# Patient Record
Sex: MALE | Race: WHITE | Employment: FULL TIME | URBAN - METROPOLITAN AREA
[De-identification: names, ages, dates, MRNs, and addresses within clinical notes are randomized per-mention and may not be internally consistent; named-entity substitution may affect disease eponyms.]

---

## 2018-12-11 ENCOUNTER — OFFICE VISIT (OUTPATIENT)
Dept: PRIMARY CARE CLINIC | Age: 48
End: 2018-12-11
Payer: COMMERCIAL

## 2018-12-11 VITALS
OXYGEN SATURATION: 98 % | HEIGHT: 68 IN | WEIGHT: 227.4 LBS | TEMPERATURE: 97.8 F | BODY MASS INDEX: 34.46 KG/M2 | SYSTOLIC BLOOD PRESSURE: 164 MMHG | HEART RATE: 100 BPM | DIASTOLIC BLOOD PRESSURE: 106 MMHG

## 2018-12-11 DIAGNOSIS — R03.0 ELEVATED BP WITHOUT DIAGNOSIS OF HYPERTENSION: ICD-10-CM

## 2018-12-11 DIAGNOSIS — Z28.21 REFUSED PNEUMOCOCCAL VACCINATION: ICD-10-CM

## 2018-12-11 DIAGNOSIS — Z80.42 FAMILY HISTORY OF PROSTATE CANCER: ICD-10-CM

## 2018-12-11 DIAGNOSIS — Z12.5 SCREENING FOR PROSTATE CANCER: ICD-10-CM

## 2018-12-11 DIAGNOSIS — R10.9 BILATERAL FLANK PAIN: Primary | ICD-10-CM

## 2018-12-11 DIAGNOSIS — R31.9 HEMATURIA, UNSPECIFIED TYPE: ICD-10-CM

## 2018-12-11 DIAGNOSIS — Z13.1 SCREENING FOR DIABETES MELLITUS: ICD-10-CM

## 2018-12-11 DIAGNOSIS — B35.1 ONYCHOMYCOSIS: ICD-10-CM

## 2018-12-11 DIAGNOSIS — E78.00 HIGH CHOLESTEROL: ICD-10-CM

## 2018-12-11 DIAGNOSIS — R35.0 URINARY FREQUENCY: ICD-10-CM

## 2018-12-11 LAB
BILIRUBIN, POC: ABNORMAL
BLOOD URINE, POC: ABNORMAL
CLARITY, POC: CLEAR
COLOR, POC: YELLOW
GLUCOSE URINE, POC: ABNORMAL
KETONES, POC: ABNORMAL
LEUKOCYTE EST, POC: ABNORMAL
NITRITE, POC: ABNORMAL
PH, POC: 6
PROTEIN, POC: ABNORMAL
SPECIFIC GRAVITY, POC: 1.02
UROBILINOGEN, POC: 0.2

## 2018-12-11 PROCEDURE — 99203 OFFICE O/P NEW LOW 30 MIN: CPT | Performed by: PHYSICIAN ASSISTANT

## 2018-12-11 PROCEDURE — 81003 URINALYSIS AUTO W/O SCOPE: CPT | Performed by: PHYSICIAN ASSISTANT

## 2018-12-11 RX ORDER — TAMSULOSIN HYDROCHLORIDE 0.4 MG/1
CAPSULE ORAL
COMMUNITY
Start: 2018-12-07 | End: 2019-01-02 | Stop reason: SDUPTHER

## 2018-12-11 ASSESSMENT — PATIENT HEALTH QUESTIONNAIRE - PHQ9
SUM OF ALL RESPONSES TO PHQ QUESTIONS 1-9: 0
SUM OF ALL RESPONSES TO PHQ QUESTIONS 1-9: 0
2. FEELING DOWN, DEPRESSED OR HOPELESS: 0
SUM OF ALL RESPONSES TO PHQ9 QUESTIONS 1 & 2: 0
1. LITTLE INTEREST OR PLEASURE IN DOING THINGS: 0

## 2018-12-11 NOTE — PROGRESS NOTES
pain with ejaculation. Father had prostate cancer about 1 year ago (diagnosed age 70) and pt had some cousins with prostate cancer as well (young in 29's). Thinks maybe one paternal uncle had prostate cancer? Using a heating pad helps the flank pain some. Does not think the back pain is positional.   Symptoms may be slightly better with Flomax. Urine looked dark, but has not seen blood. Has fungal infection on his toenails for quite awhile and would like that treated. No results found for: LDLCHOLESTEROL, LDLCALC    (goal LDL is <100)   No results found for: AST, ALT, BUN  BP Readings from Last 3 Encounters:   12/11/18 (!) 164/106          (goal 120/80)    Past Medical History:   Diagnosis Date    Hyperlipidemia     Hypertension       Past Surgical History:   Procedure Laterality Date    KNEE SURGERY Right     WISDOM TOOTH EXTRACTION Bilateral        Family History   Problem Relation Age of Onset    Prostate Cancer Father 70    Prostate Cancer Paternal Cousin        Social History   Substance Use Topics    Smoking status: Current Every Day Smoker     Packs/day: 0.50     Years: 33.00     Types: Cigarettes     Start date: 12/11/1985    Smokeless tobacco: Never Used    Alcohol use Yes      Comment: social      Current Outpatient Prescriptions   Medication Sig Dispense Refill    tamsulosin (FLOMAX) 0.4 MG capsule       aspirin 81 MG tablet Take 81 mg by mouth daily       No current facility-administered medications for this visit. No Known Allergies    Health Maintenance   Topic Date Due    HIV screen  09/03/1985    Lipid screen  09/03/2010    Diabetes screen  09/03/2010    DTaP/Tdap/Td vaccine (1 - Tdap) 09/02/2018    Flu vaccine  Completed       Subjective:      Review of Systems   Constitutional: Negative for fever. Cardiovascular: Negative for chest pain and leg swelling.    Gastrointestinal: Negative for abdominal pain (only gets abdominal pain with dairy products), nausea and vomiting. Genitourinary: Positive for flank pain, frequency and urgency. Negative for dysuria and testicular pain. Skin:        +Fungal infection of toenails   Neurological: Positive for headaches (about 2x per week). Negative for light-headedness. Objective:     BP (!) 164/106   Pulse 100   Temp 97.8 °F (36.6 °C)   Ht 5' 8\" (1.727 m)   Wt 227 lb 6.4 oz (103.1 kg)   SpO2 98%   BMI 34.58 kg/m²   Physical Exam   Constitutional: He appears well-developed and well-nourished. No distress. HENT:   Head: Normocephalic and atraumatic. Mouth/Throat: Oropharynx is clear and moist.   Cardiovascular: Normal rate, regular rhythm and normal heart sounds. Pulmonary/Chest: Effort normal and breath sounds normal.   Abdominal: Soft. Bowel sounds are normal. He exhibits no distension. There is no tenderness. There is no rebound, no guarding and no CVA tenderness. Musculoskeletal:        Lumbar back: He exhibits no tenderness and no bony tenderness. Pt said he maybe felt some of the back tenderness with trunk rotation   Skin: He is not diaphoretic. Toenail on great toe on the left foot is yellowed and toenails 1,4, and 5 are yellowed on right foot    Nursing note and vitals reviewed. Assessment:       Diagnosis Orders   1. Bilateral flank pain  CT ABDOMEN PELVIS WO CONTRAST    Urine Culture   2. Hematuria, unspecified type  CT ABDOMEN PELVIS WO CONTRAST    BUN & Creatinine    Urine Culture   3. Urinary frequency  POCT Urinalysis No Micro (Auto)    CT ABDOMEN PELVIS WO CONTRAST    BUN & Creatinine    Urine Culture   4. Screening for prostate cancer  Psa screening   5. Family history of prostate cancer  Psa screening   6. Onychomycosis  AST    ALT   7. High cholesterol  Lipid Panel    AST    ALT   8. Screening for diabetes mellitus  Glucose, Fasting   9. Elevated BP without diagnosis of hypertension     10. Refused pneumococcal vaccination          Plan:     1-3 UA today only showed small blood.  Urine

## 2018-12-12 LAB
ALT SERPL-CCNC: NORMAL U/L
AST SERPL-CCNC: NORMAL U/L
BUN / CREAT RATIO: NORMAL
BUN BLDV-MCNC: NORMAL MG/DL
CHOLESTEROL, TOTAL: 239 MG/DL
CHOLESTEROL/HDL RATIO: 6.3
CREAT SERPL-MCNC: NORMAL MG/DL
GLUCOSE FASTING: 99 MG/DL
HDLC SERPL-MCNC: 38 MG/DL (ref 35–70)
LDL CHOLESTEROL CALCULATED: 166 MG/DL (ref 0–160)
PROSTATE SPECIFIC ANTIGEN: NORMAL NG/ML
TRIGL SERPL-MCNC: 174 MG/DL
VLDLC SERPL CALC-MCNC: 35 MG/DL

## 2018-12-14 LAB — URINE CULTURE, ROUTINE: NORMAL

## 2018-12-15 PROBLEM — Z28.21 REFUSED PNEUMOCOCCAL VACCINATION: Status: ACTIVE | Noted: 2018-12-15

## 2018-12-15 PROBLEM — B35.1 ONYCHOMYCOSIS: Status: ACTIVE | Noted: 2018-12-15

## 2018-12-15 PROBLEM — Z80.42 FAMILY HISTORY OF PROSTATE CANCER: Status: ACTIVE | Noted: 2018-12-15

## 2018-12-15 PROBLEM — R03.0 ELEVATED BP WITHOUT DIAGNOSIS OF HYPERTENSION: Status: ACTIVE | Noted: 2018-12-15

## 2018-12-15 ASSESSMENT — ENCOUNTER SYMPTOMS
NAUSEA: 0
VOMITING: 0
ABDOMINAL PAIN: 0

## 2018-12-17 DIAGNOSIS — B35.1 ONYCHOMYCOSIS: Primary | ICD-10-CM

## 2018-12-17 DIAGNOSIS — Z13.1 SCREENING FOR DIABETES MELLITUS: ICD-10-CM

## 2018-12-17 DIAGNOSIS — E78.00 HIGH CHOLESTEROL: ICD-10-CM

## 2018-12-17 DIAGNOSIS — R31.9 HEMATURIA, UNSPECIFIED TYPE: ICD-10-CM

## 2018-12-17 DIAGNOSIS — Z12.5 SCREENING FOR PROSTATE CANCER: ICD-10-CM

## 2018-12-17 DIAGNOSIS — R35.0 URINARY FREQUENCY: ICD-10-CM

## 2018-12-17 DIAGNOSIS — B35.1 ONYCHOMYCOSIS: ICD-10-CM

## 2018-12-17 DIAGNOSIS — Z80.42 FAMILY HISTORY OF PROSTATE CANCER: ICD-10-CM

## 2018-12-17 RX ORDER — ATORVASTATIN CALCIUM 20 MG/1
TABLET, FILM COATED ORAL
Qty: 30 TABLET | Refills: 2 | Status: SHIPPED | OUTPATIENT
Start: 2018-12-17 | End: 2019-11-12 | Stop reason: SDUPTHER

## 2018-12-17 RX ORDER — TERBINAFINE HYDROCHLORIDE 250 MG/1
250 TABLET ORAL DAILY
Qty: 84 TABLET | Refills: 0 | Status: SHIPPED | OUTPATIENT
Start: 2018-12-17 | End: 2021-02-15

## 2019-01-02 ENCOUNTER — OFFICE VISIT (OUTPATIENT)
Dept: PRIMARY CARE CLINIC | Age: 49
End: 2019-01-02
Payer: COMMERCIAL

## 2019-01-02 VITALS
SYSTOLIC BLOOD PRESSURE: 130 MMHG | HEART RATE: 91 BPM | BODY MASS INDEX: 35.37 KG/M2 | OXYGEN SATURATION: 96 % | DIASTOLIC BLOOD PRESSURE: 76 MMHG | WEIGHT: 233.4 LBS | HEIGHT: 68 IN

## 2019-01-02 DIAGNOSIS — N40.1 BPH WITH OBSTRUCTION/LOWER URINARY TRACT SYMPTOMS: ICD-10-CM

## 2019-01-02 DIAGNOSIS — R31.21 ASYMPTOMATIC MICROSCOPIC HEMATURIA: Primary | ICD-10-CM

## 2019-01-02 DIAGNOSIS — N13.8 BPH WITH OBSTRUCTION/LOWER URINARY TRACT SYMPTOMS: ICD-10-CM

## 2019-01-02 LAB
BILIRUBIN, POC: ABNORMAL
BLOOD URINE, POC: ABNORMAL
CLARITY, POC: CLEAR
COLOR, POC: YELLOW
GLUCOSE URINE, POC: ABNORMAL
KETONES, POC: ABNORMAL
LEUKOCYTE EST, POC: ABNORMAL
NITRITE, POC: ABNORMAL
PH, POC: 6
PROTEIN, POC: ABNORMAL
SPECIFIC GRAVITY, POC: 1.02
UROBILINOGEN, POC: ABNORMAL

## 2019-01-02 PROCEDURE — 81003 URINALYSIS AUTO W/O SCOPE: CPT | Performed by: FAMILY MEDICINE

## 2019-01-02 PROCEDURE — 99214 OFFICE O/P EST MOD 30 MIN: CPT | Performed by: FAMILY MEDICINE

## 2019-01-02 RX ORDER — TAMSULOSIN HYDROCHLORIDE 0.4 MG/1
0.4 CAPSULE ORAL DAILY
Qty: 90 CAPSULE | Refills: 3 | Status: SHIPPED | OUTPATIENT
Start: 2019-01-02 | End: 2021-02-15

## 2019-01-02 RX ORDER — ALBUTEROL SULFATE 90 UG/1
2 AEROSOL, METERED RESPIRATORY (INHALATION) EVERY 6 HOURS PRN
COMMUNITY

## 2019-01-02 ASSESSMENT — ENCOUNTER SYMPTOMS
WHEEZING: 0
VOMITING: 0
EYE REDNESS: 0
SHORTNESS OF BREATH: 0
SORE THROAT: 0
DIARRHEA: 0
EYE DISCHARGE: 0
RHINORRHEA: 0
COUGH: 1
NAUSEA: 0
ABDOMINAL PAIN: 0

## 2019-11-12 DIAGNOSIS — E78.00 HIGH CHOLESTEROL: ICD-10-CM

## 2019-11-14 RX ORDER — ATORVASTATIN CALCIUM 20 MG/1
TABLET, FILM COATED ORAL
Qty: 30 TABLET | Refills: 1 | Status: SHIPPED | OUTPATIENT
Start: 2019-11-14 | End: 2021-02-15

## 2020-03-25 ENCOUNTER — TELEPHONE (OUTPATIENT)
Dept: PRIMARY CARE CLINIC | Age: 50
End: 2020-03-25

## 2020-03-25 RX ORDER — NABUMETONE 750 MG/1
750 TABLET, FILM COATED ORAL 2 TIMES DAILY
Qty: 60 TABLET | Refills: 3 | Status: SHIPPED | OUTPATIENT
Start: 2020-03-25 | End: 2021-02-15

## 2020-03-25 NOTE — TELEPHONE ENCOUNTER
Pt state that Rt knee is swollen w/ pain, hyperextending x 2 months. Pt was using ibprofen but, states it's not working to well any more.  Pt would like to know if you could send in a anti-inflammatory to [id number] [place], [address]    Please advise

## 2021-02-05 RX ORDER — NABUMETONE 750 MG/1
TABLET, FILM COATED ORAL
Qty: 60 TABLET | Refills: 0 | OUTPATIENT
Start: 2021-02-05

## 2021-02-15 ENCOUNTER — OFFICE VISIT (OUTPATIENT)
Dept: PRIMARY CARE CLINIC | Age: 51
End: 2021-02-15
Payer: COMMERCIAL

## 2021-02-15 VITALS
OXYGEN SATURATION: 97 % | WEIGHT: 233.2 LBS | TEMPERATURE: 97.2 F | DIASTOLIC BLOOD PRESSURE: 80 MMHG | HEART RATE: 103 BPM | BODY MASS INDEX: 35.34 KG/M2 | SYSTOLIC BLOOD PRESSURE: 138 MMHG | HEIGHT: 68 IN

## 2021-02-15 DIAGNOSIS — Z13.220 ENCOUNTER FOR LIPID SCREENING FOR CARDIOVASCULAR DISEASE: ICD-10-CM

## 2021-02-15 DIAGNOSIS — Z13.6 ENCOUNTER FOR LIPID SCREENING FOR CARDIOVASCULAR DISEASE: ICD-10-CM

## 2021-02-15 DIAGNOSIS — M23.91 INTERNAL DERANGEMENT OF KNEE JOINT, RIGHT: ICD-10-CM

## 2021-02-15 DIAGNOSIS — Z12.5 SCREENING PSA (PROSTATE SPECIFIC ANTIGEN): ICD-10-CM

## 2021-02-15 DIAGNOSIS — E78.00 PURE HYPERCHOLESTEROLEMIA: Primary | ICD-10-CM

## 2021-02-15 DIAGNOSIS — E78.00 HIGH CHOLESTEROL: ICD-10-CM

## 2021-02-15 DIAGNOSIS — Z00.00 ANNUAL PHYSICAL EXAM: ICD-10-CM

## 2021-02-15 PROBLEM — N40.1 BPH WITH OBSTRUCTION/LOWER URINARY TRACT SYMPTOMS: Status: RESOLVED | Noted: 2019-01-02 | Resolved: 2021-02-15

## 2021-02-15 PROBLEM — R03.0 ELEVATED BP WITHOUT DIAGNOSIS OF HYPERTENSION: Status: RESOLVED | Noted: 2018-12-15 | Resolved: 2021-02-15

## 2021-02-15 PROBLEM — N13.8 BPH WITH OBSTRUCTION/LOWER URINARY TRACT SYMPTOMS: Status: RESOLVED | Noted: 2019-01-02 | Resolved: 2021-02-15

## 2021-02-15 PROBLEM — B35.1 ONYCHOMYCOSIS: Status: RESOLVED | Noted: 2018-12-15 | Resolved: 2021-02-15

## 2021-02-15 PROCEDURE — 99213 OFFICE O/P EST LOW 20 MIN: CPT | Performed by: FAMILY MEDICINE

## 2021-02-15 RX ORDER — NABUMETONE 750 MG/1
750 TABLET, FILM COATED ORAL 2 TIMES DAILY
Qty: 60 TABLET | Refills: 5 | Status: SHIPPED | OUTPATIENT
Start: 2021-02-15 | End: 2022-01-18

## 2021-02-15 RX ORDER — ATORVASTATIN CALCIUM 20 MG/1
20 TABLET, FILM COATED ORAL DAILY
Qty: 90 TABLET | Refills: 3 | Status: SHIPPED | OUTPATIENT
Start: 2021-02-15 | End: 2022-04-27 | Stop reason: SDUPTHER

## 2021-02-15 SDOH — ECONOMIC STABILITY: TRANSPORTATION INSECURITY
IN THE PAST 12 MONTHS, HAS LACK OF TRANSPORTATION KEPT YOU FROM MEETINGS, WORK, OR FROM GETTING THINGS NEEDED FOR DAILY LIVING?: NO

## 2021-02-15 SDOH — ECONOMIC STABILITY: TRANSPORTATION INSECURITY
IN THE PAST 12 MONTHS, HAS THE LACK OF TRANSPORTATION KEPT YOU FROM MEDICAL APPOINTMENTS OR FROM GETTING MEDICATIONS?: NO

## 2021-02-15 ASSESSMENT — ENCOUNTER SYMPTOMS
COUGH: 0
DIARRHEA: 0
WHEEZING: 0
SORE THROAT: 0
VOMITING: 0
RHINORRHEA: 0
EYE REDNESS: 0
ABDOMINAL PAIN: 0
EYE DISCHARGE: 0
NAUSEA: 0
SHORTNESS OF BREATH: 0

## 2021-02-15 ASSESSMENT — PATIENT HEALTH QUESTIONNAIRE - PHQ9
2. FEELING DOWN, DEPRESSED OR HOPELESS: 0
SUM OF ALL RESPONSES TO PHQ9 QUESTIONS 1 & 2: 0

## 2021-02-15 NOTE — PROGRESS NOTES
[facility] CARE  [address] [place] [id number]  Dept: [id number] [facility] is a 48 y.o. male Established patient, who presents today for his medical conditions/complaintsas noted below. Chief Complaint   Patient presents with    Medication Check    Knee Pain     Right- had knee surgery 20 years ago       HPI:     HPI  Patient states has history of right knee surgery in the past.  This was about 20 years ago. Patient states knee is feeling the same as it did then. Patient complaining of a locking or giving out sensation. Has been using nabumetone on an as-needed basis. Pain mostly in the popliteal fossa as well as on the medial joint line. States does have some swelling. Denies any erythema or calor. Otherwise unremarkable. Patient had stopped Lipitor on his own. States not sure why. Just ran out of his prescription.     Reviewed prior notes None  Reviewed previous Labs    LDL Calculated (mg/dL)   Date Value   12/12/2018 166 (A)       (goal LDL is <100)   No results found for: AST, ALT, BUN, LABA1C, TSH  BP Readings from Last 3 Encounters:   02/15/21 138/80   01/02/19 130/76   12/11/18 (!) 164/106          (goal 120/80)    Past Medical History:   Diagnosis Date    Hyperlipidemia     Hypertension       Past Surgical History:   Procedure Laterality Date    KNEE SURGERY Right     WISDOM TOOTH EXTRACTION Bilateral        Family History   Problem Relation Age of Onset    Prostate Cancer Father 70    Prostate Cancer Paternal Cousin        Social History     Tobacco Use    Smoking status: Current Every Day Smoker     Packs/day: 0.50     Years: 33.00     Pack years: 16.50     Types: Cigarettes     Start date: 12/11/1985    Smokeless tobacco: Never Used   Substance Use Topics    Alcohol use: Yes     Comment: social      Current Outpatient Medications   Medication Sig Dispense Refill  atorvastatin (LIPITOR) 20 MG tablet Take 1 tablet by mouth daily 90 tablet 3    nabumetone (RELAFEN) 750 MG tablet Take 1 tablet by mouth 2 times daily 60 tablet 5    albuterol sulfate  (90 Base) MCG/ACT inhaler Inhale 2 puffs into the lungs every 6 hours as needed for Wheezing      aspirin 81 MG tablet Take 81 mg by mouth daily       No current facility-administered medications for this visit. No Known Allergies    Health Maintenance   Topic Date Due    Hepatitis C screen  1970    Pneumococcal 0-64 years Vaccine (1 of 1 - PPSV23) 09/03/1976    HIV screen  09/03/1985    COVID-19 Vaccine (1 of 2) 09/03/1986    DTaP/Tdap/Td vaccine (2 - Td) 04/13/2019    Lipid screen  12/12/2019    Flu vaccine (1) 09/01/2020    Shingles Vaccine (1 of 2) 09/03/2020    Colon cancer screen colonoscopy  09/03/2020    Diabetes screen  12/12/2021    Hepatitis A vaccine  Aged Out    Hepatitis B vaccine  Aged Out    Hib vaccine  Aged Out    Meningococcal (ACWY) vaccine  Aged Out       Subjective:      Review of Systems   Constitutional: Negative for chills and fever. HENT: Negative for rhinorrhea and sore throat. Eyes: Negative for discharge and redness. Respiratory: Negative for cough, shortness of breath and wheezing. Cardiovascular: Negative for chest pain and palpitations. Gastrointestinal: Negative for abdominal pain, diarrhea, nausea and vomiting. Genitourinary: Negative for dysuria and frequency. Musculoskeletal: Positive for arthralgias. Negative for myalgias. Neurological: Negative for dizziness, light-headedness and headaches. Psychiatric/Behavioral: Negative for sleep disturbance. Objective:     /80   Pulse 103   Temp 97.2 °F (36.2 °C)   Ht 5' 8.04\" (1.728 m)   Wt 233 lb 3.2 oz (105.8 kg)   SpO2 97%   BMI 35.42 kg/m²   Physical Exam  Vitals signs and nursing note reviewed. Constitutional:       General: He is not in acute distress. Appearance: He is well-developed. He is not ill-appearing. HENT:      Head: Normocephalic and atraumatic. Right Ear: External ear normal.      Left Ear: External ear normal.   Eyes:      General: No scleral icterus. Right eye: No discharge. Left eye: No discharge. Conjunctiva/sclera: Conjunctivae normal.      Pupils: Pupils are equal, round, and reactive to light. Neck:      Thyroid: No thyromegaly. Trachea: No tracheal deviation. Cardiovascular:      Rate and Rhythm: Normal rate and regular rhythm. Heart sounds: Normal heart sounds. Pulmonary:      Effort: Pulmonary effort is normal. No respiratory distress. Breath sounds: Normal breath sounds. No wheezing. Lymphadenopathy:      Cervical: No cervical adenopathy. Skin:     General: Skin is warm. Findings: No rash. Neurological:      Mental Status: He is alert and oriented to person, place, and time. Psychiatric:         Mood and Affect: Mood normal.         Behavior: Behavior normal.         Thought Content: Thought content normal.         Assessment:       Diagnosis Orders   1. Pure hypercholesterolemia     2. Screening PSA (prostate specific antigen)  PSA screening   3. Annual physical exam  Basic Metabolic Panel, Fasting    Hepatic Function Panel   4. Encounter for lipid screening for cardiovascular disease  Lipid, Fasting   5. Internal derangement of knee joint, right  Franny Gallego MD, Orthopedic Surgery, [place]   6. High cholesterol  atorvastatin (LIPITOR) 20 MG tablet        Plan:    Blood work ordered. Renew medications. Referral to orthopedic surgery for most likely repeat arthroscopy. If cholesterol high, will instruct patient to restart his Lipitor. Screening for colon cancer discussed. Patient states a strong family history of cancer on both sides. Will call when he is ready. Will give referral to general surgery. Return in about 6 months (around 8/15/2021). Orders Placed This Encounter   Procedures    Lipid, Fasting     Standing Status:   Future     Standing Expiration Date:   5/15/2021    Basic Metabolic Panel, Fasting     Standing Status:   Future     Standing Expiration Date:   5/15/2021    Hepatic Function Panel     Standing Status:   Future     Standing Expiration Date:   5/15/2021    PSA screening     Standing Status:   Future     Standing Expiration Date:   5/15/2021   Basilio Arroyo MD, Orthopedic Surgery, [place]     Referral Priority:   Routine     Referral Type:   Eval and Treat     Referral Reason:   Specialty Services Required     Referred to Provider:   Aadm Guerrero MD     Requested Specialty:   Orthopedic Surgery     Number of Visits Requested:   1     Orders Placed This Encounter   Medications    atorvastatin (LIPITOR) 20 MG tablet     Sig: Take 1 tablet by mouth daily     Dispense:  90 tablet     Refill:  3    nabumetone (RELAFEN) 750 MG tablet     Sig: Take 1 tablet by mouth 2 times daily     Dispense:  60 tablet     Refill:  5       Patient given educationalmaterials - see patient instructions. Discussed use, benefit, and side effectsof prescribed medications. All patient questions answered. Pt voiced understanding. Reviewed health maintenance. Instructed to continue current medications, diet andexercise. Patient agreed with treatment plan. Follow up as directed.      Electronicallysigned by Britney Cheung MD on 2/15/2021 at 3:14 PM

## 2021-05-06 ENCOUNTER — NURSE TRIAGE (OUTPATIENT)
Dept: OTHER | Facility: CLINIC | Age: 51
End: 2021-05-06

## 2021-05-06 ENCOUNTER — HOSPITAL ENCOUNTER (OUTPATIENT)
Age: 51
Setting detail: SPECIMEN
Discharge: HOME | End: 2021-05-06
Payer: COMMERCIAL

## 2021-05-06 ENCOUNTER — OFFICE VISIT (OUTPATIENT)
Dept: PRIMARY CARE CLINIC | Age: 51
End: 2021-05-06
Payer: COMMERCIAL

## 2021-05-06 VITALS
OXYGEN SATURATION: 98 % | SYSTOLIC BLOOD PRESSURE: 142 MMHG | TEMPERATURE: 97 F | DIASTOLIC BLOOD PRESSURE: 98 MMHG | HEART RATE: 102 BPM

## 2021-05-06 DIAGNOSIS — J06.9 UPPER RESPIRATORY TRACT INFECTION, UNSPECIFIED TYPE: ICD-10-CM

## 2021-05-06 DIAGNOSIS — J06.9 UPPER RESPIRATORY TRACT INFECTION, UNSPECIFIED TYPE: Primary | ICD-10-CM

## 2021-05-06 PROCEDURE — 99213 OFFICE O/P EST LOW 20 MIN: CPT | Performed by: FAMILY MEDICINE

## 2021-05-06 ASSESSMENT — ENCOUNTER SYMPTOMS
CHEST TIGHTNESS: 1
COUGH: 1

## 2021-05-06 NOTE — TELEPHONE ENCOUNTER
you have a cough? \" If so, ask: \"How bad is the cough? \"        Yes, mild    6. FEVER: \"Do you have a fever? \" If so, ask: \"What is your temperature, how was it measured, and when did it start? \"      Feverish, chills, no fever    7. RESPIRATORY STATUS: \"Describe your breathing? \" (e.g., shortness of breath, wheezing, unable to speak)       Breathing well, but feels SOB at times. 8. BETTER-SAME-WORSE: \"Are you getting better, staying the same or getting worse compared to yesterday? \"  If getting worse, ask, \"In what way? \"      The same since the beginning. 9. HIGH RISK DISEASE: \"Do you have any chronic medical problems? \" (e.g., asthma, heart or lung disease, weak immune system, obesity, etc.)      Denies    10. PREGNANCY: \"Is there any chance you are pregnant? \" \"When was your last menstrual period? \"        N/a    11. OTHER SYMPTOMS: \"Do you have any other symptoms? \"  (e.g., chills, fatigue, headache, loss of smell or taste, muscle pain, sore throat; new loss of smell or taste especially support the diagnosis of COVID-19)        Lung pressure, feverish, headache, cough, nasal congestion,.     Protocols used: CORONAVIRUS (COVID-19) DIAGNOSED OR SUSPECTED-[facility]

## 2021-05-06 NOTE — PATIENT INSTRUCTIONS
Patient Education        Learning About Coronavirus [phone])  What is coronavirus (COVID-19)? COVID-19 is a disease caused by a new type of coronavirus. This illness was first found in December 2019. It has since spread worldwide. Coronaviruses are a large group of viruses. They cause the common cold. They also cause more serious illnesses like Middle East respiratory syndrome (MERS) and severe acute respiratory syndrome (SARS). COVID-19 is caused by a novel coronavirus. That means it's a new type that has not been seen in people before. What are the symptoms? Coronavirus (COVID-19) symptoms may include:  · Fever. · Cough. · Trouble breathing. · Chills or repeated shaking with chills. · Muscle pain. · Headache. · Sore throat. · New loss of taste or smell. · Vomiting. · Diarrhea. In severe cases, COVID-19 can cause pneumonia and make it hard to breathe without help from a machine. It can cause death. How is it diagnosed? COVID-19 is diagnosed with a viral test. This may also be called a PCR test or antigen test. It looks for evidence of the virus in your breathing passages or lungs (respiratory system). The test is most often done on a sample from the nose, throat, or lungs. It's sometimes done on a sample of saliva. One way a sample is collected is by putting a long swab into the back of your nose. How is it treated? Mild cases of COVID-19 can be treated at home. Serious cases need treatment in the hospital. Treatment may include medicines to reduce symptoms, plus breathing support such as oxygen therapy or a ventilator. Some people may be placed on their belly to help their oxygen levels. Treatments that may help people who have COVID-19 include:  Antiviral medicines. These medicines treat viral infections. Remdesivir is an example. Immune-based therapy. These medicines help the immune system fight COVID-19. One example is bamlanivimab. It's a monoclonal antibody. Blood thinners. These medicines help prevent blood clots. People with severe illness are at risk for blood clots. How can you protect yourself and others? The best way to protect yourself from getting sick is to:  · Avoid areas where there is an outbreak. · Avoid contact with people who may be infected. · Avoid crowds and try to stay at least 6 feet away from other people. · Wash your hands often, especially after you cough or sneeze. Use soap and water, and scrub for at least 20 seconds. If soap and water aren't available, use an alcohol-based hand . · Avoid touching your mouth, nose, and eyes. To help avoid spreading the virus to others:  · Stay home if you are sick or have been exposed to the virus. Don't go to school, work, or public areas. And don't use public transportation, ride-shares, or taxis unless you have no choice. · Wear a cloth face cover if you have to go to public areas. · Cover your mouth with a tissue when you cough or sneeze. Then throw the tissue in the trash and wash your hands right away. · If you're sick:  ? Leave your home only if you need to get medical care. But call the doctor's office first so they know you're coming. And wear a face cover. ? Wear the face cover whenever you're around other people. It can help stop the spread of the virus. ? Limit contact with pets and people in your home. If possible, stay in a separate bedroom and use a separate bathroom. ? Clean and disinfect your home every day. Use household  and disinfectant wipes or sprays. Take special care to clean things that you grab with your hands. These include doorknobs, remote controls, phones, and handles on your refrigerator and microwave. And don't forget countertops, tabletops, bathrooms, and computer keyboards. When should you call for help? Call 911 anytime you think you may need emergency care.  For example, call if you have life-threatening symptoms, such as:    · You have severe trouble breathing. (You can't talk at all.)     · You have constant chest pain or pressure.     · You are severely dizzy or lightheaded.     · You are confused or can't think clearly.     · Your face and lips have a blue color.     · You pass out (lose consciousness) or are very hard to wake up. Call your doctor now or seek immediate medical care if:    · You have moderate trouble breathing. (You can't speak a full sentence.)     · You are coughing up blood (more than about 1 teaspoon).     · You have signs of low blood pressure. These include feeling lightheaded; being too weak to stand; and having cold, pale, clammy skin. Watch closely for changes in your health, and be sure to contact your doctor if:    · Your symptoms get worse.     · You are not getting better as expected. Call before you go to the doctor's office. Follow their instructions. And wear a cloth face cover. Current as of: February 19, 2021               Content Version: 12.8  © 2006-2021 [employer]. Care instructions adapted under license by [facility] ([facility]). If you have questions about a medical condition or this instruction, always ask your healthcare professional. [place] [address] any warranty or liability for your use of this information.

## 2021-05-06 NOTE — PROGRESS NOTES
Paula Sahu is a 48 y.o. Raimundo Giron presents today for his medical conditions/complaints as noted below. Chief Complaint   Patient presents with    Chest Congestion     with a productive cough    Headache     x1 week    Sinusitis         HPI:     HPI  Ill x  Days. Some pressure in lungs x 1 week, was better then worse again  Felt feverish, no temp at work or home  Coughing up some phlegm  Congestion. Headache off and on top and sides  No runny nose. No ST. No change in taste or smell. Tried nyquil    Current Outpatient Medications   Medication Sig Dispense Refill    nabumetone (RELAFEN) 750 MG tablet Take 1 tablet by mouth 2 times daily 60 tablet 5    atorvastatin (LIPITOR) 20 MG tablet Take 1 tablet by mouth daily (Patient not taking: Reported on 5/6/2021) 90 tablet 3    albuterol sulfate  (90 Base) MCG/ACT inhaler Inhale 2 puffs into the lungs every 6 hours as needed for Wheezing      aspirin 81 MG tablet Take 81 mg by mouth daily       No current facility-administered medications for this visit. No Known Allergies    Subjective:     Review of Systems   Constitutional: Positive for fever. Respiratory: Positive for cough and chest tightness. Objective:     BP (!) 142/98   Pulse 102   Temp 97 °F (36.1 °C)   SpO2 98%   Physical Exam  Vitals signs and nursing note reviewed. Constitutional:       General: He is not in acute distress. Appearance: He is well-developed. He is not ill-appearing. HENT:      Head: Normocephalic and atraumatic. Right Ear: Tympanic membrane, ear canal and external ear normal.      Left Ear: Tympanic membrane, ear canal and external ear normal.      Nose: Congestion present. Comments: Swollen red t;urbinates     Mouth/Throat:      Pharynx: No oropharyngeal exudate. Comments: Enlarged tonsils and red   Eyes:      General: No scleral icterus. Right eye: No discharge. Left eye: No discharge.       Conjunctiva/sclera: Question:   Date of Symptom Onset     Answer:   4/29/2021     Order Specific Question:   Hospitalized for COVID-19? Answer:   No     Order Specific Question:   Admitted to ICU for COVID-19? Answer:   No     Order Specific Question:   Employed in healthcare setting? Answer:   No     Order Specific Question:   Resident in a congregate (group) care setting? Answer:   No     Order Specific Question:   Pregnant: Answer:   No     No orders of the defined types were placed in this encounter. Reviewed medications and possibleside effects.        Electronically signed by Yusuf Carey MD on 5/6/2021 at 4:55 PM

## 2021-05-07 LAB
SARS-COV-2: ABNORMAL
SARS-COV-2: DETECTED
SOURCE: ABNORMAL

## 2021-08-16 ENCOUNTER — OFFICE VISIT (OUTPATIENT)
Dept: PRIMARY CARE CLINIC | Age: 51
End: 2021-08-16
Payer: COMMERCIAL

## 2021-08-16 VITALS
WEIGHT: 236.4 LBS | DIASTOLIC BLOOD PRESSURE: 100 MMHG | HEART RATE: 118 BPM | OXYGEN SATURATION: 97 % | BODY MASS INDEX: 35.83 KG/M2 | HEIGHT: 68 IN | SYSTOLIC BLOOD PRESSURE: 156 MMHG

## 2021-08-16 DIAGNOSIS — Z12.5 SCREENING PSA (PROSTATE SPECIFIC ANTIGEN): ICD-10-CM

## 2021-08-16 DIAGNOSIS — I10 ESSENTIAL HYPERTENSION: ICD-10-CM

## 2021-08-16 DIAGNOSIS — Z12.11 ENCOUNTER FOR SCREENING FOR MALIGNANT NEOPLASM OF COLON: ICD-10-CM

## 2021-08-16 DIAGNOSIS — Z00.00 ANNUAL PHYSICAL EXAM: ICD-10-CM

## 2021-08-16 DIAGNOSIS — R53.83 FATIGUE, UNSPECIFIED TYPE: ICD-10-CM

## 2021-08-16 DIAGNOSIS — Z13.220 ENCOUNTER FOR LIPID SCREENING FOR CARDIOVASCULAR DISEASE: ICD-10-CM

## 2021-08-16 DIAGNOSIS — R05.3 CHRONIC COUGH: ICD-10-CM

## 2021-08-16 DIAGNOSIS — M23.91 INTERNAL DERANGEMENT OF KNEE JOINT, RIGHT: Primary | ICD-10-CM

## 2021-08-16 DIAGNOSIS — Z13.6 ENCOUNTER FOR LIPID SCREENING FOR CARDIOVASCULAR DISEASE: ICD-10-CM

## 2021-08-16 PROCEDURE — 99214 OFFICE O/P EST MOD 30 MIN: CPT | Performed by: FAMILY MEDICINE

## 2021-08-16 RX ORDER — METOPROLOL SUCCINATE 25 MG/1
25 TABLET, EXTENDED RELEASE ORAL DAILY
Qty: 90 TABLET | Refills: 3 | Status: SHIPPED | OUTPATIENT
Start: 2021-08-16 | End: 2022-04-27 | Stop reason: SDUPTHER

## 2021-08-16 ASSESSMENT — ENCOUNTER SYMPTOMS
RHINORRHEA: 0
EYE REDNESS: 0
SORE THROAT: 0
NAUSEA: 0
VOMITING: 0
ABDOMINAL PAIN: 0
WHEEZING: 0
COUGH: 1
SHORTNESS OF BREATH: 0
EYE DISCHARGE: 0
DIARRHEA: 0

## 2021-08-16 ASSESSMENT — PATIENT HEALTH QUESTIONNAIRE - PHQ9
SUM OF ALL RESPONSES TO PHQ QUESTIONS 1-9: 0
SUM OF ALL RESPONSES TO PHQ9 QUESTIONS 1 & 2: 0
SUM OF ALL RESPONSES TO PHQ QUESTIONS 1-9: 0
2. FEELING DOWN, DEPRESSED OR HOPELESS: 0
1. LITTLE INTEREST OR PLEASURE IN DOING THINGS: 0
SUM OF ALL RESPONSES TO PHQ QUESTIONS 1-9: 0

## 2021-08-16 NOTE — PROGRESS NOTES
[address] [address]  Dept: 2381 Serg Vazquez is a 48 y.o. male Established patient, who presents today for his medical conditions/complaints as noted below. Chief Complaint   Patient presents with    Cough     x 4 weeks    Hyperlipidemia       HPI:     HPI  Patient here with complaint of severe right knee pain. States is been going on now for quite some time. Patient with history of surgery about 20 years ago. Had repair of ACL as well as some cartilage repair. Patient complaining mostly of medial knee pain. Denies any catching or locking. Patient states pain level can be 10 of 10. States the only way it is tolerable at present is to use nabumetone. Blood pressure and pulse were noted. Denies any chest pain or shortness of breath. Has not been treated for in the past.  Patient has known high cholesterol. Patient states wants to get his knee taken care of before starting Lipitor for his high cholesterol. Patient does smoke as well. Patient denies any family history of colon cancer. Patient does complain of a dry cough for the past 4 weeks.     Reviewed prior notes None  Reviewed previous Labs    LDL Calculated (mg/dL)   Date Value   12/12/2018 166 (A)       (goal LDL is <100)   No results found for: AST, ALT, BUN, LABA1C, TSH  BP Readings from Last 3 Encounters:   08/16/21 (!) 166/110   05/06/21 (!) 142/98   02/15/21 138/80          (goal 120/80)    Past Medical History:   Diagnosis Date    Hyperlipidemia     Hypertension       Past Surgical History:   Procedure Laterality Date    KNEE SURGERY Right     WISDOM TOOTH EXTRACTION Bilateral        Family History   Problem Relation Age of Onset    Prostate Cancer Father 70    Prostate Cancer Paternal Cousin        Social History     Tobacco Use    Smoking status: Current Every Day Smoker     Packs/day: 0.50     Years: 33.00     Pack years: 16.50 Types: Cigarettes     Start date: 12/11/1985    Smokeless tobacco: Never Used   Substance Use Topics    Alcohol use: Yes     Comment: social      Current Outpatient Medications   Medication Sig Dispense Refill    metoprolol succinate (TOPROL XL) 25 MG extended release tablet Take 1 tablet by mouth daily 90 tablet 3    atorvastatin (LIPITOR) 20 MG tablet Take 1 tablet by mouth daily 90 tablet 3    nabumetone (RELAFEN) 750 MG tablet Take 1 tablet by mouth 2 times daily 60 tablet 5    albuterol sulfate  (90 Base) MCG/ACT inhaler Inhale 2 puffs into the lungs every 6 hours as needed for Wheezing      aspirin 81 MG tablet Take 81 mg by mouth daily       No current facility-administered medications for this visit. No Known Allergies    Health Maintenance   Topic Date Due    Hepatitis C screen  Never done    Pneumococcal 0-64 years Vaccine (1 of 2 - PPSV23) Never done    COVID-19 Vaccine (1) Never done    HIV screen  Never done    Colon cancer screen colonoscopy  Never done    DTaP/Tdap/Td vaccine (2 - Td or Tdap) 04/13/2019    Lipid screen  12/12/2019    Shingles Vaccine (1 of 2) Never done    Flu vaccine (1) 09/01/2021    Diabetes screen  12/12/2021    Hepatitis A vaccine  Aged Out    Hepatitis B vaccine  Aged Out    Hib vaccine  Aged Out    Meningococcal (ACWY) vaccine  Aged Out       Subjective:      Review of Systems   Constitutional: Negative for chills and fever. HENT: Negative for rhinorrhea and sore throat. Eyes: Negative for discharge and redness. Respiratory: Positive for cough. Negative for shortness of breath and wheezing. Cardiovascular: Negative for chest pain and palpitations. Gastrointestinal: Negative for abdominal pain, diarrhea, nausea and vomiting. Genitourinary: Negative for dysuria and frequency. Musculoskeletal: Positive for arthralgias. Negative for myalgias. Neurological: Negative for dizziness, light-headedness and headaches. Psychiatric/Behavioral: Negative for sleep disturbance. Objective:     BP (!) 166/110   Pulse 118   Ht 5' 8.04\" (1.728 m)   Wt 236 lb 6.4 oz (107.2 kg)   SpO2 97%   BMI 35.90 kg/m²   Physical Exam  Vitals and nursing note reviewed. Constitutional:       General: He is not in acute distress. Appearance: He is well-developed. He is not ill-appearing. HENT:      Head: Normocephalic and atraumatic. Right Ear: External ear normal.      Left Ear: External ear normal.   Eyes:      General: No scleral icterus. Right eye: No discharge. Left eye: No discharge. Conjunctiva/sclera: Conjunctivae normal.      Pupils: Pupils are equal, round, and reactive to light. Neck:      Thyroid: No thyromegaly. Trachea: No tracheal deviation. Cardiovascular:      Rate and Rhythm: Regular rhythm. Tachycardia present. Heart sounds: Normal heart sounds. Pulmonary:      Effort: Pulmonary effort is normal. No respiratory distress. Breath sounds: Normal breath sounds. No wheezing. Lymphadenopathy:      Cervical: No cervical adenopathy. Skin:     General: Skin is warm. Findings: No rash. Neurological:      Mental Status: He is alert and oriented to person, place, and time. Psychiatric:         Mood and Affect: Mood normal.         Behavior: Behavior normal.         Thought Content: Thought content normal.         Assessment:       Diagnosis Orders   1. Internal derangement of knee joint, right  Franny Lowe MD, Orthopedic Surgery, [place]   2. Encounter for screening for malignant neoplasm of colon  Cologuard   3. Encounter for lipid screening for cardiovascular disease  Lipid, Fasting   4. Annual physical exam  Basic Metabolic Panel, Fasting    Hepatic Function Panel   5. Screening PSA (prostate specific antigen)  PSA screening   6. Fatigue, unspecified type  CBC With Auto Differential    T4, Free    TSH   7.  Essential hypertension  metoprolol succinate (TOPROL XL) 25 MG extended release tablet   8. Chronic cough  XR CHEST STANDARD (2 VW)        Plan:    Referral to orthopedics. Believe patient may need repeat scoping of the knee. Blood work ordered. Chest x-ray for chronic cough. Start Toprol-XL 25 mg daily for hypertension. Patient encouraged to quit smoking. Cologuard ordered. Return in about 3 months (around 11/16/2021). Orders Placed This Encounter   Procedures    Cologuard     This test is performed by an external laboratory and is used for result attachment only. It is required that this order requisition be faxed to: [employer] [facility] [employer] @@ [phone]. See [email or web address] for further information.      Standing Status:   Future     Standing Expiration Date:   10/16/2021    XR CHEST STANDARD (2 VW)     Standing Status:   Future     Standing Expiration Date:   8/16/2022     Order Specific Question:   Reason for exam:     Answer:   chronic cough    Lipid, Fasting     Standing Status:   Future     Standing Expiration Date:   11/16/2021    Basic Metabolic Panel, Fasting     Standing Status:   Future     Standing Expiration Date:   11/16/2021    Hepatic Function Panel     Standing Status:   Future     Standing Expiration Date:   11/16/2021    PSA screening     Standing Status:   Future     Standing Expiration Date:   11/16/2021    CBC With Auto Differential     Standing Status:   Future     Standing Expiration Date:   8/16/2022    T4, Free     Standing Status:   Future     Standing Expiration Date:   8/16/2022    TSH     Standing Status:   Future     Standing Expiration Date:   8/16/2022   Graciela Noriega MD, Orthopedic Surgery, [place]     Referral Priority:   Routine     Referral Type:   Eval and Treat     Referral Reason:   Specialty Services Required     Referred to Provider:   Vandana Marks MD     Requested Specialty:   Orthopedic Surgery     Number of Visits Requested:   1     Orders Placed This Encounter   Medications  metoprolol succinate (TOPROL XL) 25 MG extended release tablet     Sig: Take 1 tablet by mouth daily     Dispense:  90 tablet     Refill:  3       Patient given educational materials - see patient instructions. Discussed use, benefit, and side effects of prescribed medications. All patient questions answered. Pt voiced understanding. Reviewed health maintenance. Instructed to continue current medications, diet andexercise. Patient agreed with treatment plan. Follow up as directed.      Electronicallysigned by Odell Deras MD on 8/16/2021 at 4:36 PM

## 2021-09-03 ENCOUNTER — OFFICE VISIT (OUTPATIENT)
Dept: ORTHOPEDIC SURGERY | Age: 51
End: 2021-09-03
Payer: COMMERCIAL

## 2021-09-03 VITALS — HEIGHT: 68 IN | RESPIRATION RATE: 12 BRPM | WEIGHT: 236 LBS | BODY MASS INDEX: 35.77 KG/M2

## 2021-09-03 DIAGNOSIS — G89.29 CHRONIC PAIN OF RIGHT KNEE: Primary | ICD-10-CM

## 2021-09-03 DIAGNOSIS — M25.561 CHRONIC PAIN OF RIGHT KNEE: Primary | ICD-10-CM

## 2021-09-03 PROCEDURE — 20610 DRAIN/INJ JOINT/BURSA W/O US: CPT | Performed by: PHYSICIAN ASSISTANT

## 2021-09-03 PROCEDURE — 99204 OFFICE O/P NEW MOD 45 MIN: CPT | Performed by: PHYSICIAN ASSISTANT

## 2021-09-03 RX ORDER — BUPIVACAINE HYDROCHLORIDE 5 MG/ML
2 INJECTION, SOLUTION PERINEURAL ONCE
Status: COMPLETED | OUTPATIENT
Start: 2021-09-03 | End: 2021-09-03

## 2021-09-03 RX ORDER — LIDOCAINE HYDROCHLORIDE 10 MG/ML
2 INJECTION, SOLUTION INFILTRATION; PERINEURAL ONCE
Status: COMPLETED | OUTPATIENT
Start: 2021-09-03 | End: 2021-09-03

## 2021-09-03 RX ORDER — BETAMETHASONE SODIUM PHOSPHATE AND BETAMETHASONE ACETATE 3; 3 MG/ML; MG/ML
12 INJECTION, SUSPENSION INTRA-ARTICULAR; INTRALESIONAL; INTRAMUSCULAR; SOFT TISSUE ONCE
Status: COMPLETED | OUTPATIENT
Start: 2021-09-03 | End: 2021-09-03

## 2021-09-03 RX ADMIN — BUPIVACAINE HYDROCHLORIDE 10 MG: 5 INJECTION, SOLUTION PERINEURAL at 09:40

## 2021-09-03 RX ADMIN — LIDOCAINE HYDROCHLORIDE 2 ML: 10 INJECTION, SOLUTION INFILTRATION; PERINEURAL at 09:41

## 2021-09-03 RX ADMIN — BETAMETHASONE SODIUM PHOSPHATE AND BETAMETHASONE ACETATE 12 MG: 3; 3 INJECTION, SUSPENSION INTRA-ARTICULAR; INTRALESIONAL; INTRAMUSCULAR; SOFT TISSUE at 09:39

## 2021-09-03 NOTE — PATIENT INSTRUCTIONS
INJECTION CARE    The injection site should never get red, hot, or swollen and if it does the patient will contact our office right away. With a cortisone steroid injection, the patient may experience a increase in soreness the first 24-48 hours due to a cortisone flair and can take anti-inflammatories for a short period of time to reduce that soreness. With a lubrication injection, on rare occasion the patient may develop swelling and pain if having a reaction to the medication. If this happens, try an anti-inflammatory medication and ice as needed. If pain and swelling continues, call the office for further instructions. The patient should not submerge the injection site in water for a minimum of 24 hours to avoid infection. This means no lakes, pools, ponds, or hot tubs for 24 hours. If the patient is diabetic the injection may increase their blood sugar for up to one week. The patient can do this cortisone injection once every 3 months as needed and lubrication injections every 6 months.

## 2021-09-03 NOTE — PROGRESS NOTES
[address] [facility], 20 [facility] [address], [address] [place] [address], [id number] [place] [address]           Dept Phone: [phone]           Dept Fax:  [phone] [facility]           Jaime Bernal          Dept Phone: [phone]           Dept Fax:  [phone]      Chief Compliant:  Chief Complaint   Patient presents with    Knee Pain     right        History of Present Illness: This is a 46 y.o. male who presents to the clinic today for evaluation of had concerns including Knee Pain (right). Mr. Alta Braxton is a 70-year-old gentleman with history of right knee arthroscopy ACL repair approximately 22 years ago. Patient presents for evaluation of 2-year history of right knee pain. Patient reports that his knee \"gave out \"while going downstairs causing him to fall since then he has had intermittent knee pain. He reports he has been taking nabumetone per his PCP which does seem to control the pain. He is prescribed 750 mg twice daily however only takes it once a day. He reports if he runs out of this medication he has a great deal of difficulty walking and notes significant swelling. Pain is most severe to the anterior medial aspect of the joint. Associate with intermittent swelling anteriorly and posteriorly. Patient reports that pain does seem to be relieved by movement tone and rest.  He does note the occasional \"instability\" of the knee but he has not had any falls since the initial one 2 years ago. He denies any knee joint warmth, redness, fever or chills. No history of injections or recent physical therapy.        Past History:    Current Outpatient Medications:     metoprolol succinate (TOPROL XL) 25 MG extended release tablet, Take 1 tablet by mouth daily, Disp: 90 tablet, Rfl: 3    atorvastatin (LIPITOR) 20 MG tablet, Take 1 tablet by mouth daily, Disp: 90 tablet, Rfl: 3    nabumetone (RELAFEN) 750 MG tablet, Take 1 tablet by mouth 2 times daily, Disp: 60 tablet, Rfl: 5    albuterol sulfate  (90 Base) MCG/ACT inhaler, Inhale 2 puffs into the lungs every 6 hours as needed for Wheezing, Disp: , Rfl:     aspirin 81 MG tablet, Take 81 mg by mouth daily, Disp: , Rfl:   No Known Allergies  Social History     Socioeconomic History    Marital status: Unknown     Spouse name: Not on file    Number of children: Not on file    Years of education: Not on file    Highest education level: Not on file   Occupational History    Not on file   Tobacco Use    Smoking status: Current Every Day Smoker     Packs/day: 0.50     Years: 33.00     Pack years: 16.50     Types: Cigarettes     Start date: 12/11/1985    Smokeless tobacco: Never Used   Substance and Sexual Activity    Alcohol use: Yes     Comment: social    Drug use: No    Sexual activity: Yes     Partners: Female   Other Topics Concern    Not on file   Social History Narrative    Not on file     Social Determinants of Health     Financial Resource Strain: Low Risk     Difficulty of Paying Living Expenses: Not hard at all   Food Insecurity: No Food Insecurity    Worried About Running Out of Food in the Last Year: Never true    Allie of Food in the Last Year: Never true   Transportation Needs: No Transportation Needs    Lack of Transportation (Medical): No    Lack of Transportation (Non-Medical):  No   Physical Activity:     Days of Exercise per Week:     Minutes of Exercise per Session:    Stress:     Feeling of Stress :    Social Connections:     Frequency of Communication with Friends and Family:     Frequency of Social Gatherings with Friends and Family:     Attends Jainism Services:     Active Member of Clubs or Organizations:     Attends Club or Organization Meetings:     Marital Status:    Intimate Partner Violence:     Fear of Current or Ex-Partner:     Emotionally Abused:     Physically Abused:     Sexually Abused:      Past Medical History:   Diagnosis Date    Hyperlipidemia     Hypertension      Past Surgical History:   Procedure Laterality Date    KNEE SURGERY Right     WISDOM TOOTH EXTRACTION Bilateral      Family History   Problem Relation Age of Onset    Prostate Cancer Father 70    Prostate Cancer Paternal Cousin         Review of Systems   Constitutional: Negative for fever, chills, sweats. Eyes: Negative for changes in vision, or pain. HENT: Negative for ear ache, epistaxis, or sore throat. Respiratory/Cardio: Negative for Chest pain, palpitations, SOB, or cough. Gastrointestinal: Negative for abdominal pain, N/V/D. Genitourinary: Negative for dysuria, frequency, urgency, or hematuria. Neurological: Negative for headache, numbness, or weakness. Integumentary: Negative for rash, itching, laceration, or abrasion. Musculoskeletal: Positive for Knee Pain (right)       Physical Exam:  Constitutional: Patient is oriented to person, place, and time. Patient appears well-developed and well nourished. HENT: Negative otherwise noted  Head: Normocephalic and Atraumatic  Nose: Normal  Eyes: Conjunctivae and EOM are normal  Neck: Normal range of motion Neck supple. Respiratory/Cardio: Effort normal. No respiratory distress. Musculoskeletal:    right Knee:     Skin: warm and dry, no rash or erythema  Vasculature: 2+ pedal pulses bilaterally  Neuro: Sensation grossly intact to light touch diffusely  Alignment: Normal  Tenderness: mild medial joint line. No tenderness to quad/patellar tendon, pes anserine bursa or posterior knee.   Effusion: small    ROM: (Degrees)       A P       Extension  -5 -5       Flexion   120 120       Crepitation  Yes       Muscle strength:         Flexion   5      Extension  5      SLR   5        Extensor lag   y          Special testing:  y    Pain with deep knee flexion     y    Patellar grind       n    Patellar apprehension      n    Patellar glide         n    Lachman       n    Anterior drawer      n    Pivot shift       n    Posterior drawer      n    Dial test       n    Posterolateral drawer      n    Posterior Sag       n    MCL        n    LCL          mild    Medial joint line tenderness     n    Lateral joint line tenderness     n    Appley's         Neurological: Patient is alert and oriented to person, place, and time. Normal strenght. No sensory deficit. Skin: Skin is warm and dry  Psychiatric: Behavior is normal. Thought content normal.  Nursing note and vitals reviewed. Labs and Imaging:     No image results found. X-rays taken in clinic today and preliminarily reviewed by me:  AP bilateral knees standing lateral view of the right knee on 9/3/2021 demonstrates patient is status post ACL reconstruction tibial screw appears in appropriate position without any obvious loosening. Mild to moderate tricompartmental degenerative changes most significantly in the patellofemoral compartment tricompartmental osteophytosis present. Joint line sclerosis present bicompartmental knee. Left knee appears age-appropriate without any significant degenerative changes no evidence of acute fracture seen bilaterally. Orders Placed This Encounter   Procedures    XR KNEE RIGHT (1-2 VIEWS)     Standing Status:   Future     Number of Occurrences:   1     Standing Expiration Date:   8/30/2022       Assessment and Plan:  1. Chronic pain of right knee    2. History of ACL reconstruction 22 years ago      PLAN:  Pool Hobbs is a 46 y.o. old male with 2-year history of right knee pain. Patient with evidence of moderate DJD of this right knee on radiographs today. He does have some moderate medial joint line tenderness but he has a negative Ivon's today. I believe most of his pain is related to osteoarthritis in this right knee however I do question of possible medial meniscus tear.   Also considered is medial meniscus injury and fracture however there is no evidence of ligamentous laxity to the cruciate or collateral's there is no evidence of fracture seen on radiographs today. 1.  Discussed etiology of his pain as well as nature and extent of his problem. 2.  Discussed treatment options including nonoperative and operative intervention. At this time patient elected to proceed conservatively. 3.  Discussed possibility of a trial of a new NSAID however patient states he is happy with the nabumetone at this time. Recommended a corticosteroid injection which is done as outlined below. 4.  Recommend follow-up in 4 weeks. We will see how patient does with this injection if you provide significant relief of pain at follow-up we will see patient back on a as needed basis however should his pain return after a short period of time we will investigate further to rule out a possible medial viscus tear with an MRI of the right knee. This will discuss in further detail at future follow-up. Procedure Note: Right kne Celestone Injection   An informed verbal consent for the procedure was obtained and risks including, but not limited to: allergy to medications, injection, bleeding, stiffness of joint, recurrence of symptoms, loss of function, swelling, drainage, irrigation, need for surgery and pseudo-septic inflammation, were explained to the patient. Also, discussed was the potential for further injections, irrigation and debridement and surgery. Alternate means of treatment have also been discussed with the patient.       Administrations This Visit     betamethasone acetate-betamethasone sodium phosphate (CELESTONE) injection 12 mg     Admin Date  09/03/2021  09:39 Action  Given Dose  12 mg Route  Intra-articular Site   Administered By  Amber Mccollum MA    Ordering Provider: ELANA Black [address]: [id number]    Lot#: [id number]    : [id number] Jermaine Mascorro     Patient Supplied?: No          bupivacaine (MARCAINE) 0.5 % injection 10 mg     Admin Date  09/03/2021  09:40 Action  Given Dose  10 mg Route  Intra-articular Site   Administered By  Irene Turner MA    Ordering Provider: ELANA Hagen    NDC: 2263-2938-29    Lot#: [id number]    : Lamar Olivarez    Patient Supplied?: No          lidocaine 1 % injection 2 mL     Admin Date  09/03/2021  09:41 Action  Given Dose  2 mL Route  Intra-articular Site   Administered By  Irene Turner MA    Ordering Provider: ELANA Hagen    NDC: 5807-7424-27    Lot#: [id number]    : [id number]    Patient Supplied?: No                Following an appropriate discussion with the patient regarding the risks and benefits of the procedure he consented to proceed. his right knee was prepped using betadine solution and alcohol swab. Using aseptic technique and through a lateral joint line approach, his right knee was injected superficially with 4 cc of 1% lidocaine without epinephrine and subsequently with 2 cc of 6 mg/mL Celestone into the right knee. A band aid was applied to the injection site. he tolerated the injection with no immediate adverse reactions. Electronically signed by Theron Hagen on 9/3/21 at 9:37 AM EDT        Please note that this chart was generated using voice recognition Dragon dictation software. Although every effort was made to ensure the accuracy of this automated transcription, some errors in transcription may have occurred.

## 2022-01-18 RX ORDER — NABUMETONE 750 MG/1
TABLET, FILM COATED ORAL
Qty: 60 TABLET | Refills: 0 | Status: SHIPPED | OUTPATIENT
Start: 2022-01-18 | End: 2022-04-27 | Stop reason: SDUPTHER

## 2022-03-10 RX ORDER — NABUMETONE 750 MG/1
TABLET, FILM COATED ORAL
Qty: 60 TABLET | Refills: 0 | OUTPATIENT
Start: 2022-03-10

## 2022-04-27 ENCOUNTER — OFFICE VISIT (OUTPATIENT)
Dept: PRIMARY CARE CLINIC | Age: 52
End: 2022-04-27
Payer: COMMERCIAL

## 2022-04-27 VITALS
HEART RATE: 123 BPM | WEIGHT: 236.8 LBS | DIASTOLIC BLOOD PRESSURE: 140 MMHG | OXYGEN SATURATION: 96 % | BODY MASS INDEX: 35.89 KG/M2 | SYSTOLIC BLOOD PRESSURE: 200 MMHG | HEIGHT: 68 IN

## 2022-04-27 DIAGNOSIS — D58.2 ELEVATED HEMOGLOBIN (HCC): Primary | ICD-10-CM

## 2022-04-27 DIAGNOSIS — I10 ESSENTIAL HYPERTENSION: ICD-10-CM

## 2022-04-27 DIAGNOSIS — I10 ESSENTIAL (PRIMARY) HYPERTENSION: ICD-10-CM

## 2022-04-27 DIAGNOSIS — G89.29 CHRONIC PAIN OF BOTH KNEES: ICD-10-CM

## 2022-04-27 DIAGNOSIS — E78.00 HIGH CHOLESTEROL: ICD-10-CM

## 2022-04-27 DIAGNOSIS — Z13.1 SCREENING FOR DIABETES MELLITUS: ICD-10-CM

## 2022-04-27 DIAGNOSIS — Z11.59 ENCOUNTER FOR HEPATITIS C SCREENING TEST FOR LOW RISK PATIENT: ICD-10-CM

## 2022-04-27 DIAGNOSIS — D58.2 ELEVATED HEMOGLOBIN (HCC): ICD-10-CM

## 2022-04-27 DIAGNOSIS — M25.562 CHRONIC PAIN OF BOTH KNEES: ICD-10-CM

## 2022-04-27 DIAGNOSIS — I10 ESSENTIAL (PRIMARY) HYPERTENSION: Primary | ICD-10-CM

## 2022-04-27 DIAGNOSIS — M25.561 CHRONIC PAIN OF BOTH KNEES: ICD-10-CM

## 2022-04-27 LAB
ABSOLUTE EOS #: 0.09 K/UL (ref 0–0.44)
ABSOLUTE IMMATURE GRANULOCYTE: <0.03 K/UL (ref 0–0.3)
ABSOLUTE LYMPH #: 2.84 K/UL (ref 1.1–3.7)
ABSOLUTE MONO #: 0.75 K/UL (ref 0.1–1.2)
ALBUMIN SERPL-MCNC: 4.6 G/DL (ref 3.5–5.2)
ALBUMIN/GLOBULIN RATIO: 1.6 (ref 1–2.5)
ALP BLD-CCNC: 85 U/L (ref 40–129)
ALT SERPL-CCNC: 28 U/L (ref 5–41)
ANION GAP SERPL CALCULATED.3IONS-SCNC: 14 MMOL/L (ref 9–17)
AST SERPL-CCNC: 23 U/L
BASOPHILS # BLD: 0 % (ref 0–2)
BASOPHILS ABSOLUTE: 0.03 K/UL (ref 0–0.2)
BILIRUB SERPL-MCNC: 0.23 MG/DL (ref 0.3–1.2)
BILIRUBIN DIRECT: <0.08 MG/DL
BILIRUBIN, INDIRECT: ABNORMAL MG/DL (ref 0–1)
BUN BLDV-MCNC: 9 MG/DL (ref 6–20)
BUN/CREAT BLD: ABNORMAL (ref 9–20)
CALCIUM SERPL-MCNC: 9.5 MG/DL (ref 8.6–10.4)
CHLORIDE BLD-SCNC: 104 MMOL/L (ref 98–107)
CO2: 23 MMOL/L (ref 20–31)
CREAT SERPL-MCNC: 1.27 MG/DL (ref 0.7–1.2)
DIFFERENTIAL TYPE: ABNORMAL
EOSINOPHILS RELATIVE PERCENT: 1 % (ref 1–4)
GFR AFRICAN AMERICAN: >60 ML/MIN
GFR NON-AFRICAN AMERICAN: 60 ML/MIN
GFR SERPL CREATININE-BSD FRML MDRD: ABNORMAL ML/MIN/{1.73_M2}
GFR SERPL CREATININE-BSD FRML MDRD: ABNORMAL ML/MIN/{1.73_M2}
GLOBULIN: ABNORMAL G/DL (ref 1.5–3.8)
GLUCOSE BLD-MCNC: 85 MG/DL (ref 70–99)
HCT VFR BLD CALC: 53.1 % (ref 40.7–50.3)
HEMOGLOBIN: 18.2 G/DL (ref 13–17)
HEPATITIS C ANTIBODY: NONREACTIVE
IMMATURE GRANULOCYTES: 0 %
LYMPHOCYTES # BLD: 27 % (ref 24–43)
MCH RBC QN AUTO: 31.5 PG (ref 25.2–33.5)
MCHC RBC AUTO-ENTMCNC: 34.3 G/DL (ref 28.4–34.8)
MCV RBC AUTO: 92 FL (ref 82.6–102.9)
MONOCYTES # BLD: 7 % (ref 3–12)
NRBC AUTOMATED: 0 PER 100 WBC
PDW BLD-RTO: 12 % (ref 11.8–14.4)
PLATELET # BLD: 293 K/UL (ref 138–453)
PLATELET ESTIMATE: ABNORMAL
PMV BLD AUTO: 10.8 FL (ref 8.1–13.5)
POTASSIUM SERPL-SCNC: 3.8 MMOL/L (ref 3.7–5.3)
RBC # BLD: 5.77 M/UL (ref 4.21–5.77)
RBC # BLD: ABNORMAL 10*6/UL
SEG NEUTROPHILS: 65 % (ref 36–65)
SEGMENTED NEUTROPHILS ABSOLUTE COUNT: 6.88 K/UL (ref 1.5–8.1)
SODIUM BLD-SCNC: 141 MMOL/L (ref 135–144)
TOTAL PROTEIN: 7.5 G/DL (ref 6.4–8.3)
WBC # BLD: 10.6 K/UL (ref 3.5–11.3)
WBC # BLD: ABNORMAL 10*3/UL

## 2022-04-27 PROCEDURE — 99214 OFFICE O/P EST MOD 30 MIN: CPT | Performed by: PHYSICIAN ASSISTANT

## 2022-04-27 RX ORDER — LISINOPRIL 20 MG/1
20 TABLET ORAL DAILY
Qty: 30 TABLET | Refills: 5 | Status: SHIPPED | OUTPATIENT
Start: 2022-04-27 | End: 2022-06-14

## 2022-04-27 RX ORDER — METOPROLOL SUCCINATE 25 MG/1
25 TABLET, EXTENDED RELEASE ORAL DAILY
Qty: 90 TABLET | Refills: 3 | Status: SHIPPED | OUTPATIENT
Start: 2022-04-27

## 2022-04-27 RX ORDER — NABUMETONE 750 MG/1
TABLET, FILM COATED ORAL
Qty: 60 TABLET | Refills: 0 | Status: SHIPPED | OUTPATIENT
Start: 2022-04-27 | End: 2022-06-14

## 2022-04-27 RX ORDER — ATORVASTATIN CALCIUM 20 MG/1
20 TABLET, FILM COATED ORAL DAILY
Qty: 90 TABLET | Refills: 3 | Status: SHIPPED | OUTPATIENT
Start: 2022-04-27

## 2022-04-27 RX ORDER — HYDRALAZINE HYDROCHLORIDE 10 MG/1
25 TABLET, FILM COATED ORAL ONCE
Status: COMPLETED | OUTPATIENT
Start: 2022-04-27 | End: 2022-04-27

## 2022-04-27 RX ADMIN — HYDRALAZINE HYDROCHLORIDE 25 MG: 10 TABLET, FILM COATED ORAL at 14:23

## 2022-04-27 ASSESSMENT — ENCOUNTER SYMPTOMS
SORE THROAT: 0
CONSTIPATION: 0
SHORTNESS OF BREATH: 0
ABDOMINAL PAIN: 0
VOMITING: 0
PHOTOPHOBIA: 0
RHINORRHEA: 0
CHEST TIGHTNESS: 0
EYE DISCHARGE: 0
DIARRHEA: 0
SINUS PRESSURE: 0
ABDOMINAL DISTENTION: 0
COUGH: 0

## 2022-04-27 NOTE — PROGRESS NOTES
[facility] PRIMARY CARE  [address] [address]  Dept: [id number] Rob is a 46 y.o. male Established patient, who presents today for his medical conditions/complaints as noted below. Chief Complaint   Patient presents with    Sinusitis       HPI:     HPI: The patient has had recurring congestion. Found black mold in office net to his. Feels like this may be causuing his issues. Not going back until they are done cleaning it. Needs note from Monday the 25th to the 3rd. Does not need treatment for that. When he goes to work and is exposed it flares up again. They will be done cleaning this on the 3rd. The patient has high blood pressure. Here in office blood pressure is 222/142. Usually 180/90. Has had headache. No chest pain. Some vision changes. Has BP machine at home. Patient has not been taking his metoprolol, or atorvastatin. Patients cholesterol over 300. Patient's LDL was 232.     Reviewed prior notes None  Reviewed previous Labs    LDL Calculated (mg/dL)   Date Value   12/12/2018 166 (A)       (goal LDL is <100)   No results found for: AST, ALT, BUN, LABA1C, TSH  BP Readings from Last 3 Encounters:   04/27/22 (!) 200/140   08/16/21 (!) 156/100   05/06/21 (!) 142/98          (goal 120/80)    Past Medical History:   Diagnosis Date    Hyperlipidemia     Hypertension       Past Surgical History:   Procedure Laterality Date    KNEE SURGERY Right     WISDOM TOOTH EXTRACTION Bilateral        Family History   Problem Relation Age of Onset    Prostate Cancer Father 70    Prostate Cancer Paternal Cousin        Social History     Tobacco Use    Smoking status: Current Every Day Smoker     Packs/day: 0.50     Years: 33.00     Pack years: 16.50     Types: Cigarettes     Start date: 12/11/1985    Smokeless tobacco: Never Used   Substance Use Topics    Alcohol use: Yes     Comment: social      Current Outpatient Medications Medication Sig Dispense Refill    lisinopril (PRINIVIL;ZESTRIL) 20 MG tablet Take 1 tablet by mouth daily 30 tablet 5    nabumetone (RELAFEN) 750 MG tablet TAKE 1 TABLET BY MOUTH TWO TIMES A DAY 60 tablet 0    atorvastatin (LIPITOR) 20 MG tablet Take 1 tablet by mouth daily 90 tablet 3    metoprolol succinate (TOPROL XL) 25 MG extended release tablet Take 1 tablet by mouth daily 90 tablet 3    albuterol sulfate  (90 Base) MCG/ACT inhaler Inhale 2 puffs into the lungs every 6 hours as needed for Wheezing (Patient not taking: Reported on 4/27/2022)      aspirin 81 MG tablet Take 81 mg by mouth daily (Patient not taking: Reported on 4/27/2022)       No current facility-administered medications for this visit. No Known Allergies    Health Maintenance   Topic Date Due    COVID-19 Vaccine (1) Never done    Pneumococcal 0-64 years Vaccine (1 - PCV) Never done    HIV screen  Never done    Hepatitis C screen  Never done    Colorectal Cancer Screen  Never done    DTaP/Tdap/Td vaccine (2 - Td or Tdap) 04/13/2019    Shingles Vaccine (1 of 2) Never done    Diabetes screen  12/12/2021    Depression Screen  08/16/2022    Lipids  08/21/2022    Potassium  08/21/2022    Creatinine  08/21/2022    Flu vaccine (Season Ended) 09/01/2022    Hepatitis A vaccine  Aged Out    Hepatitis B vaccine  Aged Out    Hib vaccine  Aged Out    Meningococcal (ACWY) vaccine  Aged Out       Subjective:      Review of Systems   Constitutional: Negative for chills, fever and unexpected weight change. HENT: Negative for congestion, hearing loss, rhinorrhea, sinus pressure and sore throat. Eyes: Negative for photophobia, discharge and visual disturbance. Respiratory: Negative for cough, chest tightness and shortness of breath. Cardiovascular: Negative for chest pain, palpitations and leg swelling. Gastrointestinal: Negative for abdominal distention, abdominal pain, constipation, diarrhea and vomiting. Endocrine: Negative for polydipsia and polyuria. Genitourinary: Negative for decreased urine volume, difficulty urinating, frequency and urgency. Musculoskeletal: Negative for arthralgias, gait problem and myalgias. Skin: Negative for rash. Allergic/Immunologic: Negative for food allergies. Neurological: Negative for dizziness, weakness, numbness and headaches. Hematological: Negative for adenopathy. Psychiatric/Behavioral: Negative for dysphoric mood and sleep disturbance. The patient is not nervous/anxious. Objective:     BP (!) 200/140   Pulse 123   Ht 5' 8\" (1.727 m)   Wt 236 lb 12.8 oz (107.4 kg)   SpO2 96%   BMI 36.01 kg/m²   Physical Exam  Constitutional:       General: He is not in acute distress. Appearance: Normal appearance. He is not ill-appearing. HENT:      Head: Normocephalic and atraumatic. Right Ear: Tympanic membrane, ear canal and external ear normal.      Left Ear: Tympanic membrane, ear canal and external ear normal.      Nose: Nose normal.      Mouth/Throat:      Mouth: Mucous membranes are moist.   Eyes:      Extraocular Movements: Extraocular movements intact. Conjunctiva/sclera: Conjunctivae normal.      Pupils: Pupils are equal, round, and reactive to light. Neck:      Vascular: No carotid bruit. Cardiovascular:      Rate and Rhythm: Normal rate and regular rhythm. Pulses: Normal pulses. Heart sounds: Normal heart sounds. Pulmonary:      Effort: Pulmonary effort is normal. No respiratory distress. Breath sounds: Normal breath sounds. Abdominal:      General: Bowel sounds are normal. There is no distension. Tenderness: There is no abdominal tenderness. Musculoskeletal:         General: Normal range of motion. Cervical back: Normal range of motion and neck supple. Lymphadenopathy:      Cervical: No cervical adenopathy. Skin:     General: Skin is warm and dry.    Neurological:      General: No focal deficit present. Mental Status: He is alert and oriented to person, place, and time. Psychiatric:         Mood and Affect: Mood normal.         Behavior: Behavior normal.         Thought Content: Thought content normal.         Assessment and Plan:          1. Essential (primary) hypertension  -     hydrALAZINE (APRESOLINE) tablet 25 mg; 25 mg, Oral, ONCE, 1 dose, On Wed 4/27/22 at 1445  -     lisinopril (PRINIVIL;ZESTRIL) 20 MG tablet; Take 1 tablet by mouth daily, Disp-30 tablet, R-5Normal  -     Basic Metabolic Panel; Future  2. Chronic pain of both knees  -     nabumetone (RELAFEN) 750 MG tablet; TAKE 1 TABLET BY MOUTH TWO TIMES A DAY, Disp-60 tablet, R-0Normal  3. High cholesterol  -     atorvastatin (LIPITOR) 20 MG tablet; Take 1 tablet by mouth daily, Disp-90 tablet, R-3Normal  -     Hepatic Function Panel; Future  4. Encounter for hepatitis C screening test for low risk patient  -     Hepatitis C Antibody; Future  5. Screening for diabetes mellitus  -     Hemoglobin A1C; Future  6. Essential hypertension  -     metoprolol succinate (TOPROL XL) 25 MG extended release tablet; Take 1 tablet by mouth daily, Disp-90 tablet, R-3Normal  7. Elevated hemoglobin (HCC)     Patient started on lisinopril and educated to begin his atorvastatin. We will get repeat blood work once started. Follow-up for nurse visit blood pressure check in 1 week and follow-up with Dr. Hieu Vasquez in 3 months for blood pressure check. Patient has a history of elevated hemoglobin. We will check CBC. Patient educated with any chest pain or stroke symptoms or shortness of breath to go to the ER. Patient given hydralazine in office today. Patient given educational materials - see patient instructions. Discussed use, benefit, and side effects of prescribed medications. All patient questions answered. Pt voiced understanding. Reviewed health maintenance. Instructed to continue current medications, diet and exercise.   Patient agreed with treatment plan. Follow up as directed.      Electronically signed by ELANA Schmid on 4/27/2022 at 3:31 PM

## 2022-04-27 NOTE — PATIENT INSTRUCTIONS
Patient Education        DASH Diet: Care Instructions  Your Care Instructions     The DASH diet is an eating plan that can help lower your blood pressure. DASH stands for Dietary Approaches to Stop Hypertension. Hypertension is high bloodpressure. The DASH diet focuses on eating foods that are high in calcium, potassium, and magnesium. These nutrients can lower blood pressure. The foods that are highest in these nutrients are fruits, vegetables, low-fat dairy products, nuts, seeds, and legumes. But taking calcium, potassium, and magnesium supplements instead of eating foods that are high in those nutrients does not have the same effect. The DASH diet also includes whole grains, fish, and poultry. The DASH diet is one of several lifestyle changes your doctor may recommend to lower your high blood pressure. Your doctor may also want you to decrease the amount of sodium in your diet. Lowering sodium while following the DASH dietcan lower blood pressure even further than just the DASH diet alone. Follow-up care is a key part of your treatment and safety. Be sure to make and go to all appointments, and call your doctor if you are having problems. It's also a good idea to know your test results and keep alist of the medicines you take. How can you care for yourself at home? Following the DASH diet   Eat 4 to 5 servings of fruit each day. A serving is 1 medium-sized piece of fruit, ½ cup chopped or canned fruit, 1/4 cup dried fruit, or 4 ounces (½ cup) of fruit juice. Choose fruit more often than fruit juice.  Eat 4 to 5 servings of vegetables each day. A serving is 1 cup of lettuce or raw leafy vegetables, ½ cup of chopped or cooked vegetables, or 4 ounces (½ cup) of vegetable juice. Choose vegetables more often than vegetable juice.  Get 2 to 3 servings of low-fat and fat-free dairy each day. A serving is 8 ounces of milk, 1 cup of yogurt, or 1 ½ ounces of cheese.  Eat 6 to 8 servings of grains each day.  A serving is 1 slice of bread, 1 ounce of dry cereal, or ½ cup of cooked rice, pasta, or cooked cereal. Try to choose whole-grain products as much as possible.  Limit lean meat, poultry, and fish to 2 servings each day. A serving is 3 ounces, about the size of a deck of cards.  Eat 4 to 5 servings of nuts, seeds, and legumes (cooked dried beans, lentils, and split peas) each week. A serving is 1/3 cup of nuts, 2 tablespoons of seeds, or ½ cup of cooked beans or peas.  Limit fats and oils to 2 to 3 servings each day. A serving is 1 teaspoon of vegetable oil or 2 tablespoons of salad dressing.  Limit sweets and added sugars to 5 servings or less a week. A serving is 1 tablespoon jelly or jam, ½ cup sorbet, or 1 cup of lemonade.  Eat less than 2,300 milligrams (mg) of sodium a day. If you limit your sodium to 1,500 mg a day, you can lower your blood pressure even more.  Be aware that all of these are the suggested number of servings for people who eat 1,800 to 2,000 calories a day. Your recommended number of servings may be different if you need more or fewer calories. Tips for success   Start small. Do not try to make dramatic changes to your diet all at once. You might feel that you are missing out on your favorite foods and then be more likely to not follow the plan. Make small changes, and stick with them. Once those changes become habit, add a few more changes.  Try some of the following:  ? Make it a goal to eat a fruit or vegetable at every meal and at snacks. This will make it easy to get the recommended amount of fruits and vegetables each day. ? Try yogurt topped with fruit and nuts for a snack or healthy dessert. ? Add lettuce, tomato, cucumber, and onion to sandwiches. ? Combine a ready-made pizza crust with low-fat mozzarella cheese and lots of vegetable toppings. Try using tomatoes, squash, spinach, broccoli, carrots, cauliflower, and onions. ?  Have a variety of cut-up vegetables with a low-fat dip as an appetizer instead of chips and dip. ? Sprinkle sunflower seeds or chopped almonds over salads. Or try adding chopped walnuts or almonds to cooked vegetables. ? Try some vegetarian meals using beans and peas. Add garbanzo or kidney beans to salads. Make burritos and tacos with mashed ricks beans or black beans. Where can you learn more? Go to https://[employer] W[employer].ThinkLink. org and sign in to your [employer] account. Enter D363 in the Piazza box to learn more about \"DASH Diet: Care Instructions. \"     If you do not have an account, please click on the \"Sign Up Now\" link. Current as of: January 10, 2022               Content Version: 13.2  © 4975-0538 [employer], [employer]orporated. Care instructions adapted under license by [facility] ([facility]). If you have questions about a medical condition or this instruction, always ask your healthcare professional. [address] any warranty or liability for your use of this information.

## 2022-04-28 LAB
ESTIMATED AVERAGE GLUCOSE: 108 MG/DL
HBA1C MFR BLD: 5.4 % (ref 4–6)

## 2022-04-28 NOTE — RESULT ENCOUNTER NOTE
Call and advise patient that the results showed elevated hemoglobin. Patient should donate blood and recheck levels.

## 2022-05-03 ENCOUNTER — NURSE ONLY (OUTPATIENT)
Dept: PRIMARY CARE CLINIC | Age: 52
End: 2022-05-03

## 2022-05-03 VITALS — SYSTOLIC BLOOD PRESSURE: 138 MMHG | DIASTOLIC BLOOD PRESSURE: 92 MMHG | HEART RATE: 103 BPM | OXYGEN SATURATION: 97 %

## 2022-05-03 SDOH — ECONOMIC STABILITY: FOOD INSECURITY: WITHIN THE PAST 12 MONTHS, YOU WORRIED THAT YOUR FOOD WOULD RUN OUT BEFORE YOU GOT MONEY TO BUY MORE.: NEVER TRUE

## 2022-05-03 SDOH — ECONOMIC STABILITY: FOOD INSECURITY: WITHIN THE PAST 12 MONTHS, THE FOOD YOU BOUGHT JUST DIDN'T LAST AND YOU DIDN'T HAVE MONEY TO GET MORE.: NEVER TRUE

## 2022-05-03 ASSESSMENT — SOCIAL DETERMINANTS OF HEALTH (SDOH): HOW HARD IS IT FOR YOU TO PAY FOR THE VERY BASICS LIKE FOOD, HOUSING, MEDICAL CARE, AND HEATING?: NOT HARD AT ALL

## 2022-05-03 NOTE — PROGRESS NOTES
Patient came in today per Dr Staples [facility] orders for a nurse visit B/P check. Previous vitals were reviewed and medication was reviewed. Vitals were obtained with both our cuff and patients. Patients cuff read about 10 points off. Consulted with Dr Dunia Brennan. Per Dr Dunia Brennan, patient was asked to stay on same medications and follow up with Dr Mauricio Wagoner. Verbal understanding per patient. He was offered an appointment but stated that he would call back for an appointment.

## 2022-06-08 ENCOUNTER — TELEPHONE (OUTPATIENT)
Dept: PRIMARY CARE CLINIC | Age: 52
End: 2022-06-08

## 2022-06-08 NOTE — TELEPHONE ENCOUNTER
Patient called office c/o HBP, blurred vision, numbness in legs and joint pain. Patient states sx ongoing \"for a while\" patient states hx of HTN. Patient states he is prescribed two bp medications lisinopril and metoprolol. Patient states he alternates taking them because he cannot take them both due to abd cramping. Patient states one night he takes metoprolol at bed time then the next day he will take lisinopril at bedtime and so on. Patient states BP last night 158/95      Patient had appt scheduled 7/25/22 but canceled because he wanted to be soon as possible for this. Patient states BP is always in high range. Please advise     Is patient able to be seen sooner for this?  Please call back to schedule

## 2022-06-14 ENCOUNTER — OFFICE VISIT (OUTPATIENT)
Dept: PRIMARY CARE CLINIC | Age: 52
End: 2022-06-14
Payer: COMMERCIAL

## 2022-06-14 VITALS
DIASTOLIC BLOOD PRESSURE: 92 MMHG | SYSTOLIC BLOOD PRESSURE: 142 MMHG | HEIGHT: 68 IN | BODY MASS INDEX: 35.13 KG/M2 | HEART RATE: 116 BPM | WEIGHT: 231.8 LBS | OXYGEN SATURATION: 97 %

## 2022-06-14 DIAGNOSIS — R06.02 SHORTNESS OF BREATH: ICD-10-CM

## 2022-06-14 DIAGNOSIS — Z12.11 ENCOUNTER FOR SCREENING FOR MALIGNANT NEOPLASM OF COLON: ICD-10-CM

## 2022-06-14 DIAGNOSIS — I10 ESSENTIAL (PRIMARY) HYPERTENSION: Primary | ICD-10-CM

## 2022-06-14 PROCEDURE — 99213 OFFICE O/P EST LOW 20 MIN: CPT | Performed by: FAMILY MEDICINE

## 2022-06-14 RX ORDER — TELMISARTAN 40 MG/1
40 TABLET ORAL DAILY
Qty: 30 TABLET | Refills: 3 | Status: SHIPPED | OUTPATIENT
Start: 2022-06-14

## 2022-06-14 ASSESSMENT — ENCOUNTER SYMPTOMS
COUGH: 1
EYE REDNESS: 0
VOMITING: 0
ABDOMINAL PAIN: 0
RHINORRHEA: 0
DIARRHEA: 0
SHORTNESS OF BREATH: 1
WHEEZING: 0
NAUSEA: 0
SORE THROAT: 0
EYE DISCHARGE: 0

## 2022-06-14 ASSESSMENT — PATIENT HEALTH QUESTIONNAIRE - PHQ9
1. LITTLE INTEREST OR PLEASURE IN DOING THINGS: 0
SUM OF ALL RESPONSES TO PHQ9 QUESTIONS 1 & 2: 0
SUM OF ALL RESPONSES TO PHQ QUESTIONS 1-9: 0
2. FEELING DOWN, DEPRESSED OR HOPELESS: 0

## 2022-06-14 NOTE — PROGRESS NOTES
[facility] CARE  [address] [place] [id number]  Dept: [id number] Michelle Sousa is a 46 y.o. male Established patient, who presents today for his medical conditions/complaints as noted below. Chief Complaint   Patient presents with    Hypertension     Medication check    Blurred Vision     exposed to black mold at work       HPI:     HPI  Pt states has felt sick since last May. That was when he had Covid. Pt states was exposed to black mold at work. Pt states vision getting blurrier. Has some radiation down the legs. Pt no longer exposed to the mold. Pt states still smoking. States getting to try to quit. States has been alternating his Toprol and lisinopril to every other day due to causing stomach cramps. Patient also complaining of dryness in his throat. Patient states having multiple complaints a can be consistent with black mold exposure. Has been having numbness tingling. Sinus congestion. Notes of other people on the plantar to become ill as well.   Patient did not do the Cologuard is ordered    Reviewed prior notes None  Reviewed previous Labs    LDL Calculated (mg/dL)   Date Value   12/12/2018 166 (A)       (goal LDL is <100)   AST (U/L)   Date Value   04/27/2022 23     ALT (U/L)   Date Value   04/27/2022 28     BUN (mg/dL)   Date Value   04/27/2022 9     Hemoglobin A1C (%)   Date Value   04/27/2022 5.4     BP Readings from Last 3 Encounters:   06/14/22 (!) 148/94   05/03/22 (!) 138/92   04/27/22 (!) 200/140          (goal 120/80)    Past Medical History:   Diagnosis Date    Hyperlipidemia     Hypertension       Past Surgical History:   Procedure Laterality Date    KNEE SURGERY Right     WISDOM TOOTH EXTRACTION Bilateral        Family History   Problem Relation Age of Onset    Prostate Cancer Father 70    Prostate Cancer Paternal Cousin        Social History     Tobacco Use    Smoking status: Current Every Day Smoker Packs/day: 0.50     Years: 33.00     Pack years: 16.50     Types: Cigarettes     Start date: 12/11/1985    Smokeless tobacco: Never Used   Substance Use Topics    Alcohol use: Yes     Comment: social      Current Outpatient Medications   Medication Sig Dispense Refill    telmisartan (MICARDIS) 40 MG tablet Take 1 tablet by mouth daily 30 tablet 3    atorvastatin (LIPITOR) 20 MG tablet Take 1 tablet by mouth daily 90 tablet 3    metoprolol succinate (TOPROL XL) 25 MG extended release tablet Take 1 tablet by mouth daily 90 tablet 3    albuterol sulfate  (90 Base) MCG/ACT inhaler Inhale 2 puffs into the lungs every 6 hours as needed for Wheezing (Patient not taking: Reported on 4/27/2022)      aspirin 81 MG tablet Take 81 mg by mouth daily (Patient not taking: Reported on 4/27/2022)       No current facility-administered medications for this visit. No Known Allergies    Health Maintenance   Topic Date Due    COVID-19 Vaccine (1) Never done    Pneumococcal 0-64 years Vaccine (1 - PCV) Never done    HIV screen  Never done    Colorectal Cancer Screen  Never done    DTaP/Tdap/Td vaccine (2 - Td or Tdap) 04/13/2019    Shingles vaccine (1 of 2) Never done    Depression Screen  08/16/2022    Lipids  08/21/2022    Flu vaccine (Season Ended) 09/01/2022    Hepatitis C screen  Completed    Hepatitis A vaccine  Aged Out    Hepatitis B vaccine  Aged Out    Hib vaccine  Aged Out    Meningococcal (ACWY) vaccine  Aged Out       Subjective:      Review of Systems   Constitutional: Positive for fatigue. Negative for chills and fever. HENT: Positive for congestion. Negative for rhinorrhea and sore throat. Eyes: Negative for discharge and redness. Respiratory: Positive for cough and shortness of breath. Negative for wheezing. Cardiovascular: Negative for chest pain and palpitations. Gastrointestinal: Negative for abdominal pain, diarrhea, nausea and vomiting.    Genitourinary: Negative for dysuria and frequency. Musculoskeletal: Negative for arthralgias and myalgias. Neurological: Negative for dizziness, light-headedness and headaches. Psychiatric/Behavioral: Negative for sleep disturbance. Objective:     BP (!) 148/94   Pulse (!) 116   Ht 5' 8.04\" (1.728 m)   Wt 231 lb 12.8 oz (105.1 kg)   SpO2 97%   BMI 35.20 kg/m²   Physical Exam  Vitals and nursing note reviewed. Constitutional:       General: He is not in acute distress. Appearance: He is well-developed. He is not ill-appearing. HENT:      Head: Normocephalic and atraumatic. Right Ear: External ear normal.      Left Ear: External ear normal.   Eyes:      General: No scleral icterus. Right eye: No discharge. Left eye: No discharge. Conjunctiva/sclera: Conjunctivae normal.   Neck:      Thyroid: No thyromegaly. Trachea: No tracheal deviation. Cardiovascular:      Rate and Rhythm: Normal rate and regular rhythm. Heart sounds: Normal heart sounds. Pulmonary:      Effort: Pulmonary effort is normal. No respiratory distress. Breath sounds: Normal breath sounds. No wheezing. Lymphadenopathy:      Cervical: No cervical adenopathy. Skin:     General: Skin is warm. Findings: No rash. Neurological:      Mental Status: He is alert and oriented to person, place, and time. Psychiatric:         Mood and Affect: Mood normal.         Behavior: Behavior normal.         Thought Content: Thought content normal.         Assessment:       Diagnosis Orders   1. Essential (primary) hypertension  telmisartan (MICARDIS) 40 MG tablet   2.  Shortness of breath  XR CHEST STANDARD (2 VW)   3. Encounter for screening for malignant neoplasm of colon  Fecal DNA Colorectal cancer screening (Cologuard)        Plan:    Chest x-ray ordered due to exposure to black mold  Encouraged to take his Toprol-XL every day  Change lisinopril to Micardis due to feeling or sensation in throat as well as stomach cramps  Patient is to call in 2 to 3 weeks, if blood pressure still elevated would increase his Micardis  Follow-up in office 3 months    Return in about 3 months (around 9/14/2022). Orders Placed This Encounter   Procedures    Fecal DNA Colorectal cancer screening (Cologuard)    XR CHEST STANDARD (2 VW)     Standing Status:   Future     Standing Expiration Date:   6/14/2023     Order Specific Question:   Reason for exam:     Answer:   shortness of breth     Orders Placed This Encounter   Medications    telmisartan (MICARDIS) 40 MG tablet     Sig: Take 1 tablet by mouth daily     Dispense:  30 tablet     Refill:  3       Patient given educational materials - see patient instructions. Discussed use, benefit, and side effects of prescribed medications. All patient questions answered. Pt voiced understanding. Reviewed health maintenance. Instructed to continue current medications, diet andexercise. Patient agreed with treatment plan. Follow up as directed.      Electronicallysigned by Clarita Toledo MD on 6/14/2022 at 3:24 PM

## 2022-06-15 DIAGNOSIS — R06.02 SHORTNESS OF BREATH: ICD-10-CM

## 2022-07-18 RX ORDER — NABUMETONE 750 MG/1
TABLET, FILM COATED ORAL
Qty: 60 TABLET | Refills: 0 | OUTPATIENT
Start: 2022-07-18

## 2023-03-31 DIAGNOSIS — M25.561 CHRONIC PAIN OF BOTH KNEES: ICD-10-CM

## 2023-03-31 DIAGNOSIS — M25.562 CHRONIC PAIN OF BOTH KNEES: ICD-10-CM

## 2023-03-31 DIAGNOSIS — G89.29 CHRONIC PAIN OF BOTH KNEES: ICD-10-CM

## 2023-03-31 RX ORDER — NABUMETONE 750 MG/1
TABLET, FILM COATED ORAL
Qty: 60 TABLET | Refills: 0 | Status: SHIPPED | OUTPATIENT
Start: 2023-03-31

## 2023-03-31 NOTE — TELEPHONE ENCOUNTER
Alfonso Gutierrez listed. PT HAS APPT WITH YOU ON 4/11/23 FOR HIS HTN. TWISTED KNEE AND HAS SOME SWELLING.

## 2023-04-20 ENCOUNTER — TELEPHONE (OUTPATIENT)
Dept: PRIMARY CARE CLINIC | Age: 53
End: 2023-04-20

## 2023-04-20 NOTE — TELEPHONE ENCOUNTER
----- Message from Sarah Stevenson sent at 4/20/2023  2:06 PM EDT -----  Subject: Message to Provider    QUESTIONS  Information for Provider? Shirley Sterling from [facility] scheduling   [phone] is requesting a fax @ [phone] for order [id number]   Imaging MRI KNEE RIGHT WO CONTRAST so she can get pt. scheduled   ---------------------------------------------------------------------------  --------------  Read Blane ZELAYA  [phone]; OK to leave message on voicemail  ---------------------------------------------------------------------------  --------------  SCRIPT ANSWERS  Relationship to Patient? Covered Entity  Covered Entity Type? Hospital?  Representative Name?  Shirley Hicksrey

## 2023-05-05 DIAGNOSIS — M23.91 INTERNAL DERANGEMENT OF RIGHT KNEE: ICD-10-CM

## 2023-05-11 ENCOUNTER — OFFICE VISIT (OUTPATIENT)
Dept: ORTHOPEDIC SURGERY | Age: 53
End: 2023-05-11
Payer: COMMERCIAL

## 2023-05-11 VITALS — WEIGHT: 232 LBS | HEIGHT: 68 IN | BODY MASS INDEX: 35.16 KG/M2 | RESPIRATION RATE: 14 BRPM

## 2023-05-11 DIAGNOSIS — M25.561 CHRONIC PAIN OF RIGHT KNEE: Primary | ICD-10-CM

## 2023-05-11 DIAGNOSIS — M23.91 INTERNAL DERANGEMENT OF RIGHT KNEE: ICD-10-CM

## 2023-05-11 DIAGNOSIS — M25.561 RIGHT KNEE PAIN, UNSPECIFIED CHRONICITY: Primary | ICD-10-CM

## 2023-05-11 DIAGNOSIS — G89.29 CHRONIC PAIN OF RIGHT KNEE: Primary | ICD-10-CM

## 2023-05-11 PROCEDURE — 99213 OFFICE O/P EST LOW 20 MIN: CPT | Performed by: ORTHOPAEDIC SURGERY

## 2023-05-11 RX ORDER — METHYLPREDNISOLONE 4 MG/1
TABLET ORAL
Qty: 1 KIT | Refills: 0 | Status: SHIPPED | OUTPATIENT
Start: 2023-05-11 | End: 2023-05-17

## 2023-05-11 NOTE — PROGRESS NOTES
Transportation Needs: Unknown    Lack of Transportation (Medical): Not on file    Lack of Transportation (Non-Medical): No   Physical Activity: Not on file   Stress: Not on file   Social Connections: Not on file   Intimate Partner Violence: Not on file   Housing Stability: Unknown    Unable to Pay for Housing in the Last Year: Not on file    Number of Places Lived in the Last Year: Not on file    Unstable Housing in the Last Year: No     Past Medical History:   Diagnosis Date    Hyperlipidemia     Hypertension      Past Surgical History:   Procedure Laterality Date    KNEE SURGERY Right     WISDOM TOOTH EXTRACTION Bilateral      Family History   Problem Relation Age of Onset    Prostate Cancer Father 70    Prostate Cancer Paternal Cousin    Plan  Patient was advised the above. I feel that he would benefit from arthroscopic intervention of his right knee. I told him we do not really need to consider ACL reconstruction this time and deal with his meniscus tear which is causing his symptoms. He does do well with Relafen on an off-and-on basis for his the swelling of his knee and continue with this also has a brace and avoid twisting and turning in the meantime but he would like to get scheduled for arthroscopic intervention but he is in the midst of changing jobs and the timing of this will be left at his discretion. We will give the patient a prescription for Medrol Dosepak      Provider Attestation:  Mark Silva, personally performed the services described in this documentation. All medical record entries made by the scribe were at my direction and in my presence. I have reviewed the chart and discharge instructions and agree that the records reflect my personal performance and is accurate and complete. Tayler Gonzalez MD. 05/11/23      Please note that this chart was generated using voice recognition Dragon dictation software.   Although every effort was made to ensure the accuracy of this automated

## 2023-05-18 ENCOUNTER — OFFICE VISIT (OUTPATIENT)
Dept: PRIMARY CARE CLINIC | Age: 53
End: 2023-05-18
Payer: COMMERCIAL

## 2023-05-18 VITALS
DIASTOLIC BLOOD PRESSURE: 82 MMHG | SYSTOLIC BLOOD PRESSURE: 136 MMHG | HEART RATE: 96 BPM | BODY MASS INDEX: 35.71 KG/M2 | HEIGHT: 68 IN | WEIGHT: 235.6 LBS | OXYGEN SATURATION: 97 %

## 2023-05-18 DIAGNOSIS — Z13.220 ENCOUNTER FOR LIPID SCREENING FOR CARDIOVASCULAR DISEASE: ICD-10-CM

## 2023-05-18 DIAGNOSIS — S83.241D TEAR OF MEDIAL MENISCUS OF RIGHT KNEE, CURRENT, UNSPECIFIED TEAR TYPE, SUBSEQUENT ENCOUNTER: Primary | ICD-10-CM

## 2023-05-18 DIAGNOSIS — M25.561 CHRONIC PAIN OF BOTH KNEES: ICD-10-CM

## 2023-05-18 DIAGNOSIS — Z12.5 SCREENING PSA (PROSTATE SPECIFIC ANTIGEN): ICD-10-CM

## 2023-05-18 DIAGNOSIS — M25.562 CHRONIC PAIN OF BOTH KNEES: ICD-10-CM

## 2023-05-18 DIAGNOSIS — G89.29 CHRONIC PAIN OF BOTH KNEES: ICD-10-CM

## 2023-05-18 DIAGNOSIS — I10 ESSENTIAL HYPERTENSION: ICD-10-CM

## 2023-05-18 DIAGNOSIS — Z13.6 ENCOUNTER FOR LIPID SCREENING FOR CARDIOVASCULAR DISEASE: ICD-10-CM

## 2023-05-18 DIAGNOSIS — Z00.00 ANNUAL PHYSICAL EXAM: ICD-10-CM

## 2023-05-18 LAB
ALBUMIN SERPL-MCNC: 4.5 G/DL (ref 3.5–5.2)
ALBUMIN/GLOBULIN RATIO: 1.7 (ref 1–2.5)
ALP BLD-CCNC: 78 U/L (ref 40–129)
ALT SERPL-CCNC: 29 U/L (ref 5–41)
ANION GAP SERPL CALCULATED.3IONS-SCNC: 12 MMOL/L (ref 9–17)
AST SERPL-CCNC: 21 U/L
BILIRUB SERPL-MCNC: 0.6 MG/DL (ref 0.3–1.2)
BILIRUBIN DIRECT: 0.1 MG/DL
BILIRUBIN, INDIRECT: 0.5 MG/DL (ref 0–1)
BUN BLDV-MCNC: 13 MG/DL (ref 6–20)
CALCIUM SERPL-MCNC: 9.2 MG/DL (ref 8.6–10.4)
CHLORIDE BLD-SCNC: 105 MMOL/L (ref 98–107)
CHOLESTEROL, FASTING: 203 MG/DL
CHOLESTEROL/HDL RATIO: 5.6
CO2: 21 MMOL/L (ref 20–31)
CREAT SERPL-MCNC: 0.82 MG/DL (ref 0.7–1.2)
GFR SERPL CREATININE-BSD FRML MDRD: >60 ML/MIN/1.73M2
GLUCOSE FASTING: 115 MG/DL (ref 70–99)
HDLC SERPL-MCNC: 36 MG/DL
LDL CHOLESTEROL: 139 MG/DL (ref 0–130)
POTASSIUM SERPL-SCNC: 4.2 MMOL/L (ref 3.7–5.3)
PROSTATE SPECIFIC ANTIGEN: 0.39 NG/ML
SODIUM BLD-SCNC: 138 MMOL/L (ref 135–144)
TOTAL PROTEIN: 7.2 G/DL (ref 6.4–8.3)
TRIGLYCERIDE, FASTING: 140 MG/DL

## 2023-05-18 PROCEDURE — 3075F SYST BP GE 130 - 139MM HG: CPT | Performed by: FAMILY MEDICINE

## 2023-05-18 PROCEDURE — 99213 OFFICE O/P EST LOW 20 MIN: CPT | Performed by: FAMILY MEDICINE

## 2023-05-18 PROCEDURE — 3079F DIAST BP 80-89 MM HG: CPT | Performed by: FAMILY MEDICINE

## 2023-05-18 RX ORDER — METOPROLOL SUCCINATE 50 MG/1
50 TABLET, EXTENDED RELEASE ORAL DAILY
Qty: 90 TABLET | Refills: 3 | Status: SHIPPED | OUTPATIENT
Start: 2023-05-18

## 2023-05-18 RX ORDER — NABUMETONE 750 MG/1
TABLET, FILM COATED ORAL
Qty: 60 TABLET | Refills: 2 | Status: SHIPPED | OUTPATIENT
Start: 2023-05-18

## 2023-05-18 ASSESSMENT — ENCOUNTER SYMPTOMS
EYE DISCHARGE: 0
COUGH: 0
SORE THROAT: 0
DIARRHEA: 0
ABDOMINAL PAIN: 0
VOMITING: 0
NAUSEA: 0
WHEEZING: 0
EYE REDNESS: 0
RHINORRHEA: 0
SHORTNESS OF BREATH: 0

## 2023-05-18 NOTE — PROGRESS NOTES
[facility] CARE  [address] [place] [id number]  Dept: [phone] Cade Tate is a 46 y.o. male Established patient, who presents today for his medical conditions/complaints as noted below. Chief Complaint   Patient presents with    Knee Pain     Right- follow up Ortho and MRI       HPI:     HPI  Patient here to discuss knee issues. States was not clear after conversation with orthopedic surgeons what to do. Patient not sure whether that he needs his ACL repaired or not. Wondering if he does not get it repaired if it would worsen his medial meniscus. Patient states significant swelling. Patient states currently getting ready to start a new job  Patient-outside office has been running 140 range.     Reviewed prior notes Orthopedics  Reviewed previous Labs    LDL Calculated (mg/dL)   Date Value   12/12/2018 166 (A)       (goal LDL is <100)   AST (U/L)   Date Value   04/27/2022 23     ALT (U/L)   Date Value   04/27/2022 28     BUN (mg/dL)   Date Value   04/27/2022 9     Hemoglobin A1C (%)   Date Value   04/27/2022 5.4     BP Readings from Last 3 Encounters:   05/18/23 136/82   04/11/23 (!) 148/90   06/14/22 (!) 142/92          (goal 120/80)    Past Medical History:   Diagnosis Date    Hyperlipidemia     Hypertension       Past Surgical History:   Procedure Laterality Date    KNEE SURGERY Right     WISDOM TOOTH EXTRACTION Bilateral        Family History   Problem Relation Age of Onset    Prostate Cancer Father 70    Prostate Cancer Paternal Cousin        Social History     Tobacco Use    Smoking status: Every Day     Packs/day: 0.50     Years: 33.00     Pack years: 16.50     Types: Cigarettes     Start date: 12/11/1985    Smokeless tobacco: Never   Substance Use Topics    Alcohol use: Yes     Comment: social      Current Outpatient Medications   Medication Sig Dispense Refill    nabumetone (RELAFEN) 750 MG tablet TAKE 1 TABLET BY MOUTH TWO TIMES A

## 2023-05-19 DIAGNOSIS — E78.00 HIGH CHOLESTEROL: ICD-10-CM

## 2023-05-19 RX ORDER — ATORVASTATIN CALCIUM 40 MG/1
40 TABLET, FILM COATED ORAL NIGHTLY
Qty: 90 TABLET | Refills: 3 | Status: SHIPPED | OUTPATIENT
Start: 2023-05-19

## 2023-05-25 ENCOUNTER — TELEPHONE (OUTPATIENT)
Dept: PRIMARY CARE CLINIC | Age: 53
End: 2023-05-25

## 2023-05-25 NOTE — TELEPHONE ENCOUNTER
Patient requesting an off work note for June 5 - June 23. He has a knee surgery with Dr. Fatemeh Jackson for June 19. Patient informed I'd send message but he may have to get the off work note from surgeon. OK for note?

## 2023-06-05 ENCOUNTER — HOSPITAL ENCOUNTER (OUTPATIENT)
Dept: PREADMISSION TESTING | Age: 53
Discharge: HOME | End: 2023-06-09
Payer: COMMERCIAL

## 2023-06-05 VITALS
HEIGHT: 68 IN | OXYGEN SATURATION: 98 % | WEIGHT: 235 LBS | SYSTOLIC BLOOD PRESSURE: 139 MMHG | TEMPERATURE: 98.9 F | HEART RATE: 100 BPM | RESPIRATION RATE: 20 BRPM | DIASTOLIC BLOOD PRESSURE: 88 MMHG | BODY MASS INDEX: 35.61 KG/M2

## 2023-06-05 LAB
ANION GAP SERPL CALCULATED.3IONS-SCNC: 12 MMOL/L (ref 9–17)
BASOPHILS # BLD: 0 K/UL (ref 0–0.2)
BASOPHILS NFR BLD: 1 % (ref 0–2)
BUN SERPL-MCNC: 8 MG/DL (ref 6–20)
CALCIUM SERPL-MCNC: 9.1 MG/DL (ref 8.6–10.4)
CHLORIDE SERPL-SCNC: 103 MMOL/L (ref 98–107)
CO2 SERPL-SCNC: 24 MMOL/L (ref 20–31)
CREAT SERPL-MCNC: 0.79 MG/DL (ref 0.7–1.2)
EOSINOPHIL # BLD: 0.1 K/UL (ref 0–0.4)
EOSINOPHILS RELATIVE PERCENT: 2 % (ref 0–4)
ERYTHROCYTE [DISTWIDTH] IN BLOOD BY AUTOMATED COUNT: 13.2 % (ref 11.5–14.9)
GFR SERPL CREATININE-BSD FRML MDRD: >60 ML/MIN/1.73M2
GLUCOSE SERPL-MCNC: 118 MG/DL (ref 70–99)
HCT VFR BLD AUTO: 43.5 % (ref 41–53)
HGB BLD-MCNC: 15.3 G/DL (ref 13.5–17.5)
LYMPHOCYTES # BLD: 32 % (ref 24–44)
LYMPHOCYTES NFR BLD: 1.9 K/UL (ref 1–4.8)
MCH RBC QN AUTO: 32.6 PG (ref 26–34)
MCHC RBC AUTO-ENTMCNC: 35.2 G/DL (ref 31–37)
MCV RBC AUTO: 92.6 FL (ref 80–100)
MONOCYTES NFR BLD: 0.3 K/UL (ref 0.1–1.3)
MONOCYTES NFR BLD: 6 % (ref 1–7)
NEUTROPHILS NFR BLD: 59 % (ref 36–66)
NEUTS SEG NFR BLD: 3.6 K/UL (ref 1.3–9.1)
PLATELET # BLD AUTO: 218 K/UL (ref 150–450)
PMV BLD AUTO: 7.8 FL (ref 6–12)
POTASSIUM SERPL-SCNC: 4 MMOL/L (ref 3.7–5.3)
RBC # BLD AUTO: 4.7 M/UL (ref 4.5–5.9)
SODIUM SERPL-SCNC: 139 MMOL/L (ref 135–144)
WBC OTHER # BLD: 6 K/UL (ref 3.5–11)

## 2023-06-05 PROCEDURE — 80048 BASIC METABOLIC PNL TOTAL CA: CPT

## 2023-06-05 PROCEDURE — 93005 ELECTROCARDIOGRAM TRACING: CPT | Performed by: ANESTHESIOLOGY

## 2023-06-05 PROCEDURE — 36415 COLL VENOUS BLD VENIPUNCTURE: CPT

## 2023-06-05 PROCEDURE — APPSS45 APP SPLIT SHARED TIME 31-45 MINUTES: Performed by: NURSE PRACTITIONER

## 2023-06-05 PROCEDURE — 85027 COMPLETE CBC AUTOMATED: CPT

## 2023-06-05 ASSESSMENT — ENCOUNTER SYMPTOMS
SHORTNESS OF BREATH: 0
ABDOMINAL PAIN: 0
CONSTIPATION: 0
NAUSEA: 0
COUGH: 1
SORE THROAT: 0
VOMITING: 0
DIARRHEA: 0
WHEEZING: 0
BACK PAIN: 0

## 2023-06-05 NOTE — H&P
Heart sounds: Normal heart sounds. No murmur heard. No friction rub. No gallop. Pulmonary:      Effort: Pulmonary effort is normal. No respiratory distress. Breath sounds: Normal breath sounds. No wheezing, rhonchi or rales. Abdominal:      General: Bowel sounds are normal. There is no distension. Palpations: Abdomen is soft. Tenderness: There is no abdominal tenderness. There is no guarding. Musculoskeletal:      Cervical back: Neck supple. Right knee: Swelling present. Tenderness present over the medial joint line. Right lower leg: No edema. Left lower leg: No edema. Comments: Pt wearing compression brace to right knee   Skin:     General: Skin is warm and dry. Coloration: Skin is not jaundiced. Findings: No bruising, erythema or rash. Neurological:      General: No focal deficit present. Mental Status: He is alert and oriented to person, place, and time. Cranial Nerves: No cranial nerve deficit. Gait: Gait normal.   Psychiatric:         Mood and Affect: Mood normal.      PROVISIONAL DIAGNOSES / SURGERY:      KNEE ARTHROSCOPY PARTIAL MEDIAL MENISCECTOMY    Pre-Op Diagnosis Codes:     * Tear of medial meniscus of right knee, unspecified tear type, unspecified whether old or current tear, initial encounter [S83.241A]     Patient Active Problem List    Diagnosis Date Noted    [facility] 04/27/2022    Essential (primary) hypertension 04/27/2022    High cholesterol 12/17/2018    Family history of prostate cancer 12/15/2018    Refused pneumococcal vaccination 12/15/2018         Based on my personal evaluation of patient including review of patient's chart, no clearance requested for scheduled surgery.     CARSON Alba CNP on 6/5/2023 at 10:45 AM    Total time spent on encounter- PAT provider minutes: 31-40 minutes

## 2023-06-05 NOTE — DISCHARGE INSTRUCTIONS
Pre-op Instructions For Out-Patient Surgery    Medication Instructions:  Please stop herbs and any supplements now (includes vitamins and minerals). Please contact your surgeon and prescribing physician for pre-op instructions for any blood thinners. Relafen     If you have inhalers/aerosol treatments at home, please use them the morning of your surgery and bring the inhalers with you to the hospital.    Please take the following medications the morning of your surgery with a sip of water:    Talmisartan / Metoprolol   (Your morning blood pressure medication )    Surgery Instructions:  After midnight before surgery:  Do not eat or drink anything, including water, mints, gum, and hard candy. You may brush your teeth without swallowing. No smoking, chewing tobacco, or street drugs. Please shower or bathe before surgery. If you were given Surgical Scrub Chlorhexidine Gluconate Liquid (CHG), please shower the night before and the morning of your surgery following the detailed instructions you received during your pre-admission visit. Please do not wear any cologne, lotion, powder, deodorant, jewelry, piercings, perfume, makeup, nail polish, hair accessories, or hair spray on the day of surgery. Wear loose comfortable clothing. Leave your valuables at home. Bring a storage case for any glasses/contacts. An adult who is responsible for you MUST drive you home and should be with you for the first 24 hours after surgery. If having out-patient knee and foot surgeries, please arrange for planned crutches, walker, or wheelchair before arriving to the hospital.    The Day of Surgery:  Arrive at [facility] AT [facility] Surgery Entrance at the time directed by your surgeon and check in at the desk. If you have a living will or healthcare power of , please bring a copy. You will be taken to the pre-op holding area where you will be prepared for surgery.   A

## 2023-06-06 LAB
EKG ATRIAL RATE: 92 BPM
EKG P AXIS: 24 DEGREES
EKG P-R INTERVAL: 150 MS
EKG Q-T INTERVAL: 370 MS
EKG QRS DURATION: 138 MS
EKG QTC CALCULATION (BAZETT): 457 MS
EKG R AXIS: -23 DEGREES
EKG T AXIS: 10 DEGREES
EKG VENTRICULAR RATE: 92 BPM

## 2023-06-06 PROCEDURE — 93010 ELECTROCARDIOGRAM REPORT: CPT | Performed by: INTERNAL MEDICINE

## 2023-06-16 ENCOUNTER — ANESTHESIA EVENT (OUTPATIENT)
Dept: OPERATING ROOM | Age: 53
End: 2023-06-16
Payer: COMMERCIAL

## 2023-06-19 ENCOUNTER — HOSPITAL ENCOUNTER (OUTPATIENT)
Age: 53
Setting detail: OUTPATIENT SURGERY
Discharge: HOME | End: 2023-06-19
Attending: ORTHOPAEDIC SURGERY | Admitting: ORTHOPAEDIC SURGERY
Payer: COMMERCIAL

## 2023-06-19 ENCOUNTER — ANESTHESIA (OUTPATIENT)
Dept: OPERATING ROOM | Age: 53
End: 2023-06-19
Payer: COMMERCIAL

## 2023-06-19 VITALS
WEIGHT: 235 LBS | HEIGHT: 68 IN | HEART RATE: 65 BPM | RESPIRATION RATE: 17 BRPM | DIASTOLIC BLOOD PRESSURE: 78 MMHG | BODY MASS INDEX: 35.61 KG/M2 | OXYGEN SATURATION: 97 % | TEMPERATURE: 97.7 F | SYSTOLIC BLOOD PRESSURE: 142 MMHG

## 2023-06-19 DIAGNOSIS — S83.241A ACUTE MEDIAL MENISCUS TEAR OF RIGHT KNEE, INITIAL ENCOUNTER: Primary | ICD-10-CM

## 2023-06-19 PROCEDURE — 2500000003 HC RX 250 WO HCPCS: Performed by: NURSE ANESTHETIST, CERTIFIED REGISTERED

## 2023-06-19 PROCEDURE — 7100000030 HC ASPR PHASE II RECOVERY - FIRST 15 MIN: Performed by: ORTHOPAEDIC SURGERY

## 2023-06-19 PROCEDURE — 6360000002 HC RX W HCPCS: Performed by: ORTHOPAEDIC SURGERY

## 2023-06-19 PROCEDURE — 2580000003 HC RX 258: Performed by: ANESTHESIOLOGY

## 2023-06-19 PROCEDURE — 29881 ARTHRS KNE SRG MNISECTMY M/L: CPT | Performed by: ORTHOPAEDIC SURGERY

## 2023-06-19 PROCEDURE — 7100000000 HC PACU RECOVERY - FIRST 15 MIN: Performed by: ORTHOPAEDIC SURGERY

## 2023-06-19 PROCEDURE — 3700000001 HC ADD 15 MINUTES (ANESTHESIA): Performed by: ORTHOPAEDIC SURGERY

## 2023-06-19 PROCEDURE — 6360000002 HC RX W HCPCS: Performed by: NURSE ANESTHETIST, CERTIFIED REGISTERED

## 2023-06-19 PROCEDURE — 3700000000 HC ANESTHESIA ATTENDED CARE: Performed by: ORTHOPAEDIC SURGERY

## 2023-06-19 PROCEDURE — 7100000031 HC ASPR PHASE II RECOVERY - ADDTL 15 MIN: Performed by: ORTHOPAEDIC SURGERY

## 2023-06-19 PROCEDURE — 7100000001 HC PACU RECOVERY - ADDTL 15 MIN: Performed by: ORTHOPAEDIC SURGERY

## 2023-06-19 PROCEDURE — 6370000000 HC RX 637 (ALT 250 FOR IP): Performed by: ANESTHESIOLOGY

## 2023-06-19 PROCEDURE — 3600000003 HC SURGERY LEVEL 3 BASE: Performed by: ORTHOPAEDIC SURGERY

## 2023-06-19 PROCEDURE — 2709999900 HC NON-CHARGEABLE SUPPLY: Performed by: ORTHOPAEDIC SURGERY

## 2023-06-19 PROCEDURE — 7100000010 HC PHASE II RECOVERY - FIRST 15 MIN: Performed by: ORTHOPAEDIC SURGERY

## 2023-06-19 PROCEDURE — 7100000011 HC PHASE II RECOVERY - ADDTL 15 MIN: Performed by: ORTHOPAEDIC SURGERY

## 2023-06-19 PROCEDURE — 3600000013 HC SURGERY LEVEL 3 ADDTL 15MIN: Performed by: ORTHOPAEDIC SURGERY

## 2023-06-19 RX ORDER — SODIUM CHLORIDE 0.9 % (FLUSH) 0.9 %
5-40 SYRINGE (ML) INJECTION PRN
Status: DISCONTINUED | OUTPATIENT
Start: 2023-06-19 | End: 2023-06-19 | Stop reason: HOSPADM

## 2023-06-19 RX ORDER — ONDANSETRON 2 MG/ML
INJECTION INTRAMUSCULAR; INTRAVENOUS PRN
Status: DISCONTINUED | OUTPATIENT
Start: 2023-06-19 | End: 2023-06-19 | Stop reason: SDUPTHER

## 2023-06-19 RX ORDER — DEXAMETHASONE SODIUM PHOSPHATE 4 MG/ML
INJECTION, SOLUTION INTRA-ARTICULAR; INTRALESIONAL; INTRAMUSCULAR; INTRAVENOUS; SOFT TISSUE PRN
Status: DISCONTINUED | OUTPATIENT
Start: 2023-06-19 | End: 2023-06-19 | Stop reason: SDUPTHER

## 2023-06-19 RX ORDER — HYDRALAZINE HYDROCHLORIDE 20 MG/ML
10 INJECTION INTRAMUSCULAR; INTRAVENOUS
Status: DISCONTINUED | OUTPATIENT
Start: 2023-06-19 | End: 2023-06-19 | Stop reason: HOSPADM

## 2023-06-19 RX ORDER — LIDOCAINE HYDROCHLORIDE 20 MG/ML
INJECTION, SOLUTION EPIDURAL; INFILTRATION; INTRACAUDAL; PERINEURAL PRN
Status: DISCONTINUED | OUTPATIENT
Start: 2023-06-19 | End: 2023-06-19 | Stop reason: SDUPTHER

## 2023-06-19 RX ORDER — FENTANYL CITRATE 0.05 MG/ML
25 INJECTION, SOLUTION INTRAMUSCULAR; INTRAVENOUS EVERY 5 MIN PRN
Status: DISCONTINUED | OUTPATIENT
Start: 2023-06-19 | End: 2023-06-19 | Stop reason: HOSPADM

## 2023-06-19 RX ORDER — SODIUM CHLORIDE, SODIUM LACTATE, POTASSIUM CHLORIDE, CALCIUM CHLORIDE 600; 310; 30; 20 MG/100ML; MG/100ML; MG/100ML; MG/100ML
INJECTION, SOLUTION INTRAVENOUS CONTINUOUS
Status: DISCONTINUED | OUTPATIENT
Start: 2023-06-19 | End: 2023-06-19 | Stop reason: HOSPADM

## 2023-06-19 RX ORDER — ONDANSETRON 2 MG/ML
4 INJECTION INTRAMUSCULAR; INTRAVENOUS
Status: DISCONTINUED | OUTPATIENT
Start: 2023-06-19 | End: 2023-06-19 | Stop reason: HOSPADM

## 2023-06-19 RX ORDER — SODIUM CHLORIDE 9 MG/ML
INJECTION, SOLUTION INTRAVENOUS PRN
Status: DISCONTINUED | OUTPATIENT
Start: 2023-06-19 | End: 2023-06-19 | Stop reason: HOSPADM

## 2023-06-19 RX ORDER — PROPOFOL 10 MG/ML
INJECTION, EMULSION INTRAVENOUS PRN
Status: DISCONTINUED | OUTPATIENT
Start: 2023-06-19 | End: 2023-06-19 | Stop reason: SDUPTHER

## 2023-06-19 RX ORDER — METOCLOPRAMIDE HYDROCHLORIDE 5 MG/ML
10 INJECTION INTRAMUSCULAR; INTRAVENOUS
Status: DISCONTINUED | OUTPATIENT
Start: 2023-06-19 | End: 2023-06-19 | Stop reason: HOSPADM

## 2023-06-19 RX ORDER — FENTANYL CITRATE 50 UG/ML
INJECTION, SOLUTION INTRAMUSCULAR; INTRAVENOUS PRN
Status: DISCONTINUED | OUTPATIENT
Start: 2023-06-19 | End: 2023-06-19 | Stop reason: SDUPTHER

## 2023-06-19 RX ORDER — HYDROCODONE BITARTRATE AND ACETAMINOPHEN 5; 325 MG/1; MG/1
1 TABLET ORAL EVERY 6 HOURS PRN
Status: COMPLETED | OUTPATIENT
Start: 2023-06-19 | End: 2023-06-19

## 2023-06-19 RX ORDER — ACETAMINOPHEN 500 MG
1000 TABLET ORAL ONCE
Status: COMPLETED | OUTPATIENT
Start: 2023-06-19 | End: 2023-06-19

## 2023-06-19 RX ORDER — LIDOCAINE HYDROCHLORIDE 10 MG/ML
1 INJECTION, SOLUTION EPIDURAL; INFILTRATION; INTRACAUDAL; PERINEURAL
Status: DISCONTINUED | OUTPATIENT
Start: 2023-06-19 | End: 2023-06-19 | Stop reason: HOSPADM

## 2023-06-19 RX ORDER — HYDROCODONE BITARTRATE AND ACETAMINOPHEN 5; 325 MG/1; MG/1
1 TABLET ORAL EVERY 6 HOURS PRN
Qty: 20 TABLET | Refills: 0 | Status: CANCELLED | OUTPATIENT
Start: 2023-06-19 | End: 2023-06-24

## 2023-06-19 RX ORDER — SODIUM CHLORIDE 0.9 % (FLUSH) 0.9 %
5-40 SYRINGE (ML) INJECTION EVERY 12 HOURS SCHEDULED
Status: DISCONTINUED | OUTPATIENT
Start: 2023-06-19 | End: 2023-06-19 | Stop reason: HOSPADM

## 2023-06-19 RX ORDER — MIDAZOLAM HYDROCHLORIDE 1 MG/ML
INJECTION INTRAMUSCULAR; INTRAVENOUS PRN
Status: DISCONTINUED | OUTPATIENT
Start: 2023-06-19 | End: 2023-06-19 | Stop reason: SDUPTHER

## 2023-06-19 RX ORDER — ROPIVACAINE HYDROCHLORIDE 5 MG/ML
INJECTION, SOLUTION EPIDURAL; INFILTRATION; PERINEURAL PRN
Status: DISCONTINUED | OUTPATIENT
Start: 2023-06-19 | End: 2023-06-19 | Stop reason: ALTCHOICE

## 2023-06-19 RX ORDER — KETOROLAC TROMETHAMINE 30 MG/ML
INJECTION, SOLUTION INTRAMUSCULAR; INTRAVENOUS PRN
Status: DISCONTINUED | OUTPATIENT
Start: 2023-06-19 | End: 2023-06-19 | Stop reason: SDUPTHER

## 2023-06-19 RX ORDER — DIPHENHYDRAMINE HYDROCHLORIDE 50 MG/ML
12.5 INJECTION INTRAMUSCULAR; INTRAVENOUS
Status: DISCONTINUED | OUTPATIENT
Start: 2023-06-19 | End: 2023-06-19 | Stop reason: HOSPADM

## 2023-06-19 RX ORDER — HYDROCODONE BITARTRATE AND ACETAMINOPHEN 5; 325 MG/1; MG/1
1 TABLET ORAL EVERY 6 HOURS PRN
Qty: 20 TABLET | Refills: 0 | Status: SHIPPED | OUTPATIENT
Start: 2023-06-19 | End: 2023-06-24

## 2023-06-19 RX ORDER — GABAPENTIN 300 MG/1
300 CAPSULE ORAL ONCE
Status: COMPLETED | OUTPATIENT
Start: 2023-06-19 | End: 2023-06-19

## 2023-06-19 RX ORDER — LABETALOL HYDROCHLORIDE 5 MG/ML
10 INJECTION, SOLUTION INTRAVENOUS
Status: DISCONTINUED | OUTPATIENT
Start: 2023-06-19 | End: 2023-06-19 | Stop reason: HOSPADM

## 2023-06-19 RX ADMIN — MIDAZOLAM 2 MG: 1 INJECTION INTRAMUSCULAR; INTRAVENOUS at 08:58

## 2023-06-19 RX ADMIN — FENTANYL CITRATE 50 MCG: 50 INJECTION, SOLUTION INTRAMUSCULAR; INTRAVENOUS at 09:01

## 2023-06-19 RX ADMIN — GABAPENTIN 300 MG: 300 CAPSULE ORAL at 08:10

## 2023-06-19 RX ADMIN — HYDROCODONE BITARTRATE AND ACETAMINOPHEN 1 TABLET: 5; 325 TABLET ORAL at 10:47

## 2023-06-19 RX ADMIN — FENTANYL CITRATE 50 MCG: 50 INJECTION, SOLUTION INTRAMUSCULAR; INTRAVENOUS at 09:10

## 2023-06-19 RX ADMIN — SODIUM CHLORIDE, POTASSIUM CHLORIDE, SODIUM LACTATE AND CALCIUM CHLORIDE: 600; 310; 30; 20 INJECTION, SOLUTION INTRAVENOUS at 08:00

## 2023-06-19 RX ADMIN — LIDOCAINE HYDROCHLORIDE 100 MG: 20 INJECTION, SOLUTION EPIDURAL; INFILTRATION; INTRACAUDAL; PERINEURAL at 09:01

## 2023-06-19 RX ADMIN — FENTANYL CITRATE 25 MCG: 50 INJECTION, SOLUTION INTRAMUSCULAR; INTRAVENOUS at 09:27

## 2023-06-19 RX ADMIN — ONDANSETRON 4 MG: 2 INJECTION INTRAMUSCULAR; INTRAVENOUS at 09:08

## 2023-06-19 RX ADMIN — DEXAMETHASONE SODIUM PHOSPHATE 4 MG: 4 INJECTION, SOLUTION INTRAMUSCULAR; INTRAVENOUS at 09:08

## 2023-06-19 RX ADMIN — KETOROLAC TROMETHAMINE 30 MG: 30 INJECTION, SOLUTION INTRAMUSCULAR; INTRAVENOUS at 09:29

## 2023-06-19 RX ADMIN — PROPOFOL 200 MG: 10 INJECTION, EMULSION INTRAVENOUS at 09:01

## 2023-06-19 RX ADMIN — ACETAMINOPHEN 1000 MG: 500 TABLET ORAL at 08:10

## 2023-06-19 ASSESSMENT — PAIN SCALES - GENERAL
PAINLEVEL_OUTOF10: 5
PAINLEVEL_OUTOF10: 3
PAINLEVEL_OUTOF10: 5
PAINLEVEL_OUTOF10: 4
PAINLEVEL_OUTOF10: 5

## 2023-06-19 ASSESSMENT — PAIN - FUNCTIONAL ASSESSMENT: PAIN_FUNCTIONAL_ASSESSMENT: 0-10

## 2023-06-19 ASSESSMENT — PAIN DESCRIPTION - LOCATION: LOCATION: KNEE

## 2023-06-19 ASSESSMENT — PAIN DESCRIPTION - PAIN TYPE: TYPE: SURGICAL PAIN

## 2023-06-19 ASSESSMENT — PAIN DESCRIPTION - DESCRIPTORS: DESCRIPTORS: ACHING

## 2023-06-19 ASSESSMENT — PAIN DESCRIPTION - ORIENTATION: ORIENTATION: RIGHT

## 2023-06-19 NOTE — INTERVAL H&P NOTE
Update History & Physical    The patient's History and Physical of   June 5, 2023     Patient will be having : Procedure(s):  KNEE ARTHROSCOPY PARTIAL MEDIAL MENISCECTOMY-RIGHT  The surgical site was confirmed by the  patient and me. There was no change. Or updates at this time. Patient did not require  clearance. Patient has been NPO since midnight. No blood thinners in the past 5-7 days. (Aspirin)     Patient took Toprol XL this am with sip of water. Patient denies any personal or family problems with anesthesia. Patient was physically assessed, including cardiovascular and respiratory. Patient understands and wants to proceed with the procedure.      Electronically signed by CARSON Mclaughlin CNP on 6/19/2023 at 7:33 AM    Note marked for cosign by provider

## 2023-06-19 NOTE — DISCHARGE INSTRUCTIONS
Mica Gonzalez M.D.  [facility] ARTHROSCOPY     Follow-up in office in 7-10 days:  Call for appointment if not already made. ([phone]). Take pain medication as ordered. Keep the dressing dry and intact until seen in the office. Do not remove Ultra-sling except to do exercises or shower. Unless instructed otherwise, you may shower if you have an Op-site (clear dressing) on. Remove the sling and keep your arm at your side when showering. Unless instructed otherwise, start Codman's/Pendulum exercises tomorrow as tolerated. Remove sling for these exercises. Ice pack to surgical site for 20 to 30 minutes four times a day for 48 hours or if supplied with a Cryo-Cuff, use as directed. Call Dr. Angelic Roe office if you experience any of the following:  Temperature above 101° F.  Persistent nausea and vomiting. Redness, swelling, hardness, or bleeding that doesn't stop. Severe pain that is not relieved by pain meds.

## 2023-06-19 NOTE — ANESTHESIA POSTPROCEDURE EVALUATION
Department of Anesthesiology  Postprocedure Note    Patient: Jesus Mccarty  MRN: [id number]  YOB: 1970  Date of evaluation: 6/19/2023      Procedure Summary     Date: 06/19/23 Room / Location: [address] [place] [address] / [facility]: LISANDRO FREEMAN    Anesthesia Start: 1539 Anesthesia Stop: 0050    Procedure: KNEE ARTHROSCOPY PARTIAL MEDIAL MENISCECTOMY (Right: Knee) Diagnosis:       Tear of medial meniscus of right knee, unspecified tear type, unspecified whether old or current tear, initial encounter      (Tear of medial meniscus of right knee, unspecified tear type, unspecified whether old or current tear, initial encounter [V30.695Y])    Surgeons: Fredy Lagos MD Responsible Provider: Mariah Koyanagi, MD    Anesthesia Type: general ASA Status: 2          Anesthesia Type: No value filed.     Luiz Phase I: Luiz Score: 10    Luiz Phase II: Luiz Score: 10      Anesthesia Post Evaluation    Comments: POST- ANESTHESIA EVALUATION       Pt Name: Jesus Mccarty  MRN: [id number]  YOB: 1970  Date of evaluation: 6/19/2023  Time:  11:04 AM      BP (!) 141/94   Pulse 66   Temp 97.7 °F (36.5 °C) (Temporal)   Resp 18   Ht 5' 8\" (1.727 m)   Wt 235 lb (106.6 kg)   SpO2 97%   BMI 35.73 kg/m²      Consciousness Level  Awake  Cardiopulmonary Status  Stable  Pain Adequately Treated YES  Nausea / Vomiting  NO  Adequate Hydration  YES  Anesthesia Related Complications NONE      Electronically signed by Mariah Koyanagi, MD on 6/19/2023 at 11:04 AM

## 2023-06-19 NOTE — ANESTHESIA PRE PROCEDURE
Department of Anesthesiology  Preprocedure Note       Name:  Emerson Lui   Age:  46 y.o.  :  1970                                          MRN:  [id number]         Date:  2023      Surgeon: Opal Arredondo):  Ginger Rebolledo MD    Procedure: Procedure(s):  KNEE ARTHROSCOPY PARTIAL MEDIAL MENISCECTOMY    Medications prior to admission:   Prior to Admission medications    Medication Sig Start Date End Date Taking?  Authorizing Provider   atorvastatin (LIPITOR) 40 MG tablet Take 1 tablet by mouth nightly 23   Maria Elena Perera MD   nabumetone (RELAFEN) 750 MG tablet TAKE 1 TABLET BY MOUTH TWO TIMES A DAY 23   Maria Elena Perera MD   metoprolol succinate (TOPROL XL) 50 MG extended release tablet Take 1 tablet by mouth daily 23   Maria Elena Perera MD   telmisartan (MICARDIS) 40 MG tablet Take 1 tablet by mouth daily 23   Maria Elena Perera MD   fluticasone [facility]) 50 MCG/ACT nasal spray 2 sprays by Each Nostril route daily 23   Maria Elena Perera MD   albuterol sulfate  (90 Base) MCG/ACT inhaler Inhale 2 puffs into the lungs every 6 hours as needed for Wheezing  Patient not taking: Reported on 2023    Historical Provider, MD   aspirin 81 MG tablet Take 1 tablet by mouth daily    Historical Provider, MD       Current medications:    Current Facility-Administered Medications   Medication Dose Route Frequency Provider Last Rate Last Admin    sodium chloride flush 0.9 % injection 5-40 mL  5-40 mL IntraVENous 2 times per day Najma Douglas MD        sodium chloride flush 0.9 % injection 5-40 mL  5-40 mL IntraVENous PRN Najma Douglas MD        0.9 % sodium chloride infusion   IntraVENous PRN Najma Douglas MD        lactated ringers IV soln infusion   IntraVENous Continuous Hany Jolene Oppenheim, MD        lidocaine PF 1 % injection 1 mL  1 mL IntraDERmal Once PRN Najma Douglas MD        acetaminophen (TYLENOL) tablet 1,000 mg  1,000 mg Oral Once Najma Douglas MD        gabapentin

## 2023-06-19 NOTE — OP NOTE
tourniquet was then deflated at less than 30 minutes. The patient was awaken and taken to the PACU in good condition.             Electronically signed by Raquel Carrington MD on 6/19/2023 at 9:38 AM

## 2023-06-30 ENCOUNTER — OFFICE VISIT (OUTPATIENT)
Dept: ORTHOPEDIC SURGERY | Age: 53
End: 2023-06-30

## 2023-06-30 DIAGNOSIS — Z98.890 S/P RIGHT KNEE ARTHROSCOPY: Primary | ICD-10-CM

## 2023-06-30 PROCEDURE — 99024 POSTOP FOLLOW-UP VISIT: CPT | Performed by: ORTHOPAEDIC SURGERY

## 2023-11-12 DIAGNOSIS — M25.562 CHRONIC PAIN OF BOTH KNEES: ICD-10-CM

## 2023-11-12 DIAGNOSIS — M25.561 CHRONIC PAIN OF BOTH KNEES: ICD-10-CM

## 2023-11-12 DIAGNOSIS — I10 ESSENTIAL (PRIMARY) HYPERTENSION: ICD-10-CM

## 2023-11-12 DIAGNOSIS — G89.29 CHRONIC PAIN OF BOTH KNEES: ICD-10-CM

## 2023-11-15 RX ORDER — TELMISARTAN 40 MG/1
40 TABLET ORAL DAILY
Qty: 30 TABLET | Refills: 0 | OUTPATIENT
Start: 2023-11-15

## 2023-11-15 RX ORDER — NABUMETONE 750 MG/1
TABLET, FILM COATED ORAL
Qty: 60 TABLET | Refills: 0 | OUTPATIENT
Start: 2023-11-15

## 2023-12-28 ENCOUNTER — OFFICE VISIT (OUTPATIENT)
Dept: PRIMARY CARE CLINIC | Age: 53
End: 2023-12-28
Payer: COMMERCIAL

## 2023-12-28 VITALS
WEIGHT: 250 LBS | SYSTOLIC BLOOD PRESSURE: 134 MMHG | DIASTOLIC BLOOD PRESSURE: 86 MMHG | HEIGHT: 68 IN | OXYGEN SATURATION: 97 % | HEART RATE: 89 BPM | BODY MASS INDEX: 37.89 KG/M2

## 2023-12-28 DIAGNOSIS — I10 ESSENTIAL (PRIMARY) HYPERTENSION: Primary | ICD-10-CM

## 2023-12-28 DIAGNOSIS — B35.3 TINEA PEDIS OF BOTH FEET: ICD-10-CM

## 2023-12-28 DIAGNOSIS — R09.81 NASAL CONGESTION: ICD-10-CM

## 2023-12-28 DIAGNOSIS — D58.2 ELEVATED HEMOGLOBIN (HCC): ICD-10-CM

## 2023-12-28 DIAGNOSIS — Z23 NEED FOR VACCINATION: ICD-10-CM

## 2023-12-28 DIAGNOSIS — E66.01 SEVERE OBESITY (BMI 35.0-39.9) WITH COMORBIDITY (HCC): ICD-10-CM

## 2023-12-28 DIAGNOSIS — R06.02 SHORTNESS OF BREATH: ICD-10-CM

## 2023-12-28 DIAGNOSIS — Z23 NEED FOR VIRAL IMMUNIZATION: ICD-10-CM

## 2023-12-28 DIAGNOSIS — B35.1 ONYCHOMYCOSIS: ICD-10-CM

## 2023-12-28 PROCEDURE — 90472 IMMUNIZATION ADMIN EACH ADD: CPT | Performed by: FAMILY MEDICINE

## 2023-12-28 PROCEDURE — 90674 CCIIV4 VAC NO PRSV 0.5 ML IM: CPT | Performed by: FAMILY MEDICINE

## 2023-12-28 PROCEDURE — 90750 HZV VACC RECOMBINANT IM: CPT | Performed by: FAMILY MEDICINE

## 2023-12-28 PROCEDURE — 3079F DIAST BP 80-89 MM HG: CPT | Performed by: FAMILY MEDICINE

## 2023-12-28 PROCEDURE — 99214 OFFICE O/P EST MOD 30 MIN: CPT | Performed by: FAMILY MEDICINE

## 2023-12-28 PROCEDURE — 90471 IMMUNIZATION ADMIN: CPT | Performed by: FAMILY MEDICINE

## 2023-12-28 PROCEDURE — 3075F SYST BP GE 130 - 139MM HG: CPT | Performed by: FAMILY MEDICINE

## 2023-12-28 RX ORDER — ALBUTEROL SULFATE 90 UG/1
2 AEROSOL, METERED RESPIRATORY (INHALATION) EVERY 6 HOURS PRN
Qty: 18 G | Refills: 3 | Status: SHIPPED | OUTPATIENT
Start: 2023-12-28

## 2023-12-28 RX ORDER — FLUCONAZOLE 150 MG/1
150 TABLET ORAL WEEKLY
Qty: 52 TABLET | Refills: 0 | Status: SHIPPED | OUTPATIENT
Start: 2023-12-28

## 2023-12-28 RX ORDER — TELMISARTAN 80 MG/1
80 TABLET ORAL DAILY
Qty: 90 TABLET | Refills: 3 | Status: SHIPPED | OUTPATIENT
Start: 2023-12-28

## 2023-12-28 RX ORDER — FLUTICASONE PROPIONATE 50 MCG
2 SPRAY, SUSPENSION (ML) NASAL DAILY
Qty: 48 G | Refills: 1 | Status: SHIPPED | OUTPATIENT
Start: 2023-12-28

## 2024-01-29 ENCOUNTER — HOSPITAL ENCOUNTER (OUTPATIENT)
Age: 54
Setting detail: SPECIMEN
Discharge: HOME | End: 2024-01-29

## 2024-01-29 ENCOUNTER — OFFICE VISIT (OUTPATIENT)
Dept: PRIMARY CARE CLINIC | Age: 54
End: 2024-01-29
Payer: COMMERCIAL

## 2024-01-29 VITALS
DIASTOLIC BLOOD PRESSURE: 82 MMHG | HEART RATE: 122 BPM | WEIGHT: 246.8 LBS | HEIGHT: 68 IN | SYSTOLIC BLOOD PRESSURE: 134 MMHG | BODY MASS INDEX: 37.41 KG/M2 | OXYGEN SATURATION: 95 %

## 2024-01-29 DIAGNOSIS — M13.0 POLYARTHRITIS: ICD-10-CM

## 2024-01-29 DIAGNOSIS — I10 ESSENTIAL HYPERTENSION: Primary | ICD-10-CM

## 2024-01-29 DIAGNOSIS — M79.10 MYALGIA: ICD-10-CM

## 2024-01-29 DIAGNOSIS — E78.00 HIGH CHOLESTEROL: ICD-10-CM

## 2024-01-29 DIAGNOSIS — R53.1 WEAKNESS: ICD-10-CM

## 2024-01-29 DIAGNOSIS — E66.01 SEVERE OBESITY (BMI 35.0-39.9) WITH COMORBIDITY (HCC): ICD-10-CM

## 2024-01-29 DIAGNOSIS — R35.89 POLYURIA: ICD-10-CM

## 2024-01-29 PROBLEM — D58.2 ELEVATED HEMOGLOBIN (HCC): Status: RESOLVED | Noted: 2022-04-27 | Resolved: 2024-01-29

## 2024-01-29 PROCEDURE — 99214 OFFICE O/P EST MOD 30 MIN: CPT | Performed by: FAMILY MEDICINE

## 2024-01-29 PROCEDURE — 3079F DIAST BP 80-89 MM HG: CPT | Performed by: FAMILY MEDICINE

## 2024-01-29 PROCEDURE — 3075F SYST BP GE 130 - 139MM HG: CPT | Performed by: FAMILY MEDICINE

## 2024-01-29 ASSESSMENT — ENCOUNTER SYMPTOMS
DIARRHEA: 0
EYE REDNESS: 0
NAUSEA: 0
COUGH: 0
SHORTNESS OF BREATH: 0
ABDOMINAL PAIN: 0
SORE THROAT: 0
EYE DISCHARGE: 0
RHINORRHEA: 0
WHEEZING: 0
VOMITING: 0

## 2024-01-29 ASSESSMENT — PATIENT HEALTH QUESTIONNAIRE - PHQ9
SUM OF ALL RESPONSES TO PHQ9 QUESTIONS 1 & 2: 2
SUM OF ALL RESPONSES TO PHQ QUESTIONS 1-9: 2
1. LITTLE INTEREST OR PLEASURE IN DOING THINGS: 1
2. FEELING DOWN, DEPRESSED OR HOPELESS: 1
SUM OF ALL RESPONSES TO PHQ QUESTIONS 1-9: 2

## 2024-01-29 NOTE — PROGRESS NOTES
[facility]  [facility] PRIMARY CARE  [address] [facility] [address]  Dept: [phone]    Christiano Wade is a 53 y.o. male Established patient, who presents today for his medical conditions/complaints as noted below.      Chief Complaint   Patient presents with    Numbness     Pt c/o bilateral leg numbness when standing or walking, facial numbness, bilateral arm numbness. Pt has family hx of MS     Fatigue     Pt states he has been having fatigue and muscle fatigue and stiffness    Urinary Frequency     Pt having urinary frequency for days to weeks at a time.    Dysphagia     Pt c/o difficulty swallowing    Speech Problem       HPI:     HPI  Patient here with multiple complaints.  States has been having diffuse numbness.  Patient also has been having some difficulty swallowing.  Patient states has had some visual field changes.  States he lives in an area where there is a higher incidence of multiple sclerosis.  Patient is concerned that he may have this.  Patient also complaining of a weakness in the legs.  Complains that his muscles fatigue very easily.  Denies any recent change in weight.  Otherwise unremarkable.  Patient states his symptoms come and go.  Patient also complaining a urinary symptoms.  Having urinary frequency.  States strength of stream is good.    Denies any recent change in weight.  No melena or hematochezia.      Reviewed prior notes None  Reviewed previous Labs    LDL Cholesterol (mg/dL)   Date Value   05/18/2023 139 (H)     LDL Calculated (mg/dL)   Date Value   12/12/2018 166 (A)       (goal LDL is <100)   AST (U/L)   Date Value   05/18/2023 21     ALT (U/L)   Date Value   05/18/2023 29     BUN (mg/dL)   Date Value   06/05/2023 8     Hemoglobin A1C (%)   Date Value   04/27/2022 5.4     BP Readings from Last 3 Encounters:   01/29/24 134/82   12/28/23 134/86   06/19/23 (!) 142/78          (goal 120/80)    Past Medical History:   Diagnosis Date    Arthritis

## 2024-01-30 DIAGNOSIS — R35.89 POLYURIA: ICD-10-CM

## 2024-01-30 LAB
AMORPH SED URNS QL MICRO: ABNORMAL
BILIRUB UR QL STRIP: ABNORMAL
CLARITY UR: ABNORMAL
COLOR UR: ABNORMAL
EPI CELLS #/AREA URNS HPF: ABNORMAL /HPF (ref 0–5)
GLUCOSE UR STRIP-MCNC: NEGATIVE MG/DL
HGB UR QL STRIP.AUTO: NEGATIVE
KETONES UR STRIP-MCNC: ABNORMAL MG/DL
LEUKOCYTE ESTERASE UR QL STRIP: ABNORMAL
MUCOUS THREADS URNS QL MICRO: ABNORMAL
NITRITE UR QL STRIP: NEGATIVE
PH UR STRIP: 5 [PH] (ref 5–8)
PROT UR STRIP-MCNC: ABNORMAL MG/DL
RBC #/AREA URNS HPF: ABNORMAL /HPF (ref 0–2)
SP GR UR STRIP: 1.03 (ref 1–1.03)
UROBILINOGEN UR STRIP-ACNC: NORMAL EU/DL (ref 0–1)
WBC #/AREA URNS HPF: ABNORMAL /HPF (ref 0–5)

## 2024-02-10 LAB
A/G RATIO: NORMAL
ALBUMIN SERPL-MCNC: NORMAL G/DL
ALP BLD-CCNC: NORMAL U/L
ALT SERPL-CCNC: NORMAL U/L
ANION GAP SERPL CALCULATED.3IONS-SCNC: NORMAL MMOL/L
AST SERPL-CCNC: NORMAL U/L
BILIRUB SERPL-MCNC: NORMAL MG/DL
BILIRUBIN DIRECT: NORMAL
BILIRUBIN, INDIRECT: NORMAL
BUN BLDV-MCNC: NORMAL MG/DL
BUN/CREAT BLD: NORMAL
CALCIUM SERPL-MCNC: NORMAL MG/DL
CHLORIDE BLD-SCNC: NORMAL MMOL/L
CHOLESTEROL, FASTING: 159
CO2: NORMAL
CREAT SERPL-MCNC: NORMAL MG/DL
GFR AFRICAN AMERICAN: NORMAL
GFR NON-AFRICAN AMERICAN: NORMAL
GLOBULIN: NORMAL
GLUCOSE FASTING: 121 MG/DL
HDLC SERPL-MCNC: 37 MG/DL (ref 35–70)
LDL CHOLESTEROL CALCULATED: 87 MG/DL (ref 0–160)
POTASSIUM SERPL-SCNC: NORMAL MMOL/L
PROTEIN TOTAL: NORMAL
RHEUMATOID FACTOR: NORMAL
SEDIMENTATION RATE, ERYTHROCYTE: NORMAL
SODIUM BLD-SCNC: NORMAL MMOL/L
T4 FREE: NORMAL
TOTAL CK: NORMAL
TRIGLYCERIDE, FASTING: 174
TSH SERPL DL<=0.05 MIU/L-ACNC: NORMAL M[IU]/L

## 2024-02-12 DIAGNOSIS — M79.10 MYALGIA: ICD-10-CM

## 2024-02-12 DIAGNOSIS — M13.0 POLYARTHRITIS: ICD-10-CM

## 2024-02-12 DIAGNOSIS — R53.1 WEAKNESS: ICD-10-CM

## 2024-02-12 DIAGNOSIS — E78.00 HIGH CHOLESTEROL: ICD-10-CM

## 2024-02-19 DIAGNOSIS — R53.1 WEAKNESS: ICD-10-CM

## 2024-02-20 ENCOUNTER — TELEPHONE (OUTPATIENT)
Dept: PRIMARY CARE CLINIC | Age: 54
End: 2024-02-20

## 2024-02-26 ENCOUNTER — TELEPHONE (OUTPATIENT)
Dept: PRIMARY CARE CLINIC | Age: 54
End: 2024-02-26

## 2024-02-26 DIAGNOSIS — Z13.1 SCREENING FOR DIABETES MELLITUS: Primary | ICD-10-CM

## 2024-02-26 NOTE — TELEPHONE ENCOUNTER
Pt asking for A1C order put in and would like to sent it to [facility].

## 2024-03-09 LAB
ESTIMATED AVERAGE GLUCOSE: 128
HBA1C MFR BLD: 6.1 %

## 2024-03-12 DIAGNOSIS — Z13.1 SCREENING FOR DIABETES MELLITUS: ICD-10-CM

## 2024-04-30 DIAGNOSIS — R53.1 WEAKNESS: ICD-10-CM

## 2024-07-01 ENCOUNTER — OFFICE VISIT (OUTPATIENT)
Dept: PRIMARY CARE CLINIC | Age: 54
End: 2024-07-01
Payer: COMMERCIAL

## 2024-07-01 VITALS
WEIGHT: 251.4 LBS | BODY MASS INDEX: 38.1 KG/M2 | HEIGHT: 68 IN | HEART RATE: 110 BPM | DIASTOLIC BLOOD PRESSURE: 106 MMHG | OXYGEN SATURATION: 97 % | SYSTOLIC BLOOD PRESSURE: 174 MMHG

## 2024-07-01 DIAGNOSIS — B35.1 ONYCHOMYCOSIS: ICD-10-CM

## 2024-07-01 DIAGNOSIS — I10 ESSENTIAL (PRIMARY) HYPERTENSION: Primary | ICD-10-CM

## 2024-07-01 DIAGNOSIS — G62.9 NEUROPATHY: ICD-10-CM

## 2024-07-01 PROCEDURE — 99214 OFFICE O/P EST MOD 30 MIN: CPT | Performed by: FAMILY MEDICINE

## 2024-07-01 PROCEDURE — 3080F DIAST BP >= 90 MM HG: CPT | Performed by: FAMILY MEDICINE

## 2024-07-01 PROCEDURE — 3077F SYST BP >= 140 MM HG: CPT | Performed by: FAMILY MEDICINE

## 2024-07-01 RX ORDER — FLUCONAZOLE 150 MG/1
300 TABLET ORAL WEEKLY
Qty: 26 TABLET | Refills: 0 | Status: SHIPPED | OUTPATIENT
Start: 2024-07-01

## 2024-07-01 RX ORDER — FLUCONAZOLE 150 MG/1
150 TABLET ORAL WEEKLY
Qty: 52 TABLET | Refills: 0 | Status: CANCELLED | OUTPATIENT
Start: 2024-07-01

## 2024-07-01 SDOH — ECONOMIC STABILITY: HOUSING INSECURITY
IN THE LAST 12 MONTHS, WAS THERE A TIME WHEN YOU DID NOT HAVE A STEADY PLACE TO SLEEP OR SLEPT IN A SHELTER (INCLUDING NOW)?: NO

## 2024-07-01 SDOH — ECONOMIC STABILITY: FOOD INSECURITY: WITHIN THE PAST 12 MONTHS, YOU WORRIED THAT YOUR FOOD WOULD RUN OUT BEFORE YOU GOT MONEY TO BUY MORE.: NEVER TRUE

## 2024-07-01 SDOH — ECONOMIC STABILITY: INCOME INSECURITY: HOW HARD IS IT FOR YOU TO PAY FOR THE VERY BASICS LIKE FOOD, HOUSING, MEDICAL CARE, AND HEATING?: NOT HARD AT ALL

## 2024-07-01 SDOH — ECONOMIC STABILITY: FOOD INSECURITY: WITHIN THE PAST 12 MONTHS, THE FOOD YOU BOUGHT JUST DIDN'T LAST AND YOU DIDN'T HAVE MONEY TO GET MORE.: NEVER TRUE

## 2024-07-01 ASSESSMENT — ENCOUNTER SYMPTOMS
VOMITING: 0
EYE DISCHARGE: 0
WHEEZING: 0
SHORTNESS OF BREATH: 0
DIARRHEA: 0
NAUSEA: 0
RHINORRHEA: 0
ABDOMINAL PAIN: 0
SORE THROAT: 0
EYE REDNESS: 0
COUGH: 0

## 2024-07-01 ASSESSMENT — PATIENT HEALTH QUESTIONNAIRE - PHQ9
SUM OF ALL RESPONSES TO PHQ QUESTIONS 1-9: 0
SUM OF ALL RESPONSES TO PHQ QUESTIONS 1-9: 0
2. FEELING DOWN, DEPRESSED OR HOPELESS: NOT AT ALL
SUM OF ALL RESPONSES TO PHQ QUESTIONS 1-9: 0
1. LITTLE INTEREST OR PLEASURE IN DOING THINGS: NOT AT ALL
SUM OF ALL RESPONSES TO PHQ QUESTIONS 1-9: 0
SUM OF ALL RESPONSES TO PHQ9 QUESTIONS 1 & 2: 0

## 2024-07-01 NOTE — PROGRESS NOTES
[facility]  [facility] PRIMARY CARE  [address]  [facility] [address]  Dept: [phone]    Christiano Wade is a 53 y.o. male Established patient, who presents today for his medical conditions/complaints as noted below.      Chief Complaint   Patient presents with    Results     Patient here today to discuss results    Hypertension     6 month f/u - patient stopped medication to see if his pain would go away. - patient states his joint pain went away, but still having numbness.       HPI:     Pt presents for 6 month follow up for HTN.  He stopped taking atorvastatin, metoprolol, and telmisartan due to sx of joint pain and numbness 3 weeks ago. He recently had testing for leg numbness that revealed no pathology. Aching in joints has improved but numbness and aching in legs is still present.    He has not been checking BP at home. He reports some headaches, but no dizziness, light headedness, chest pain.    Reviewed prior notes None  Reviewed previous Labs and Imaging    No components found for: \"LDLCHOLESTEROL\", \"LDLCALC\"    (goal LDL is <100)   AST (U/L)   Date Value   05/18/2023 21     ALT (U/L)   Date Value   05/18/2023 29     BUN (mg/dL)   Date Value   06/05/2023 8     Hemoglobin A1C (%)   Date Value   03/09/2024 6.1     BP Readings from Last 3 Encounters:   07/01/24 (!) 174/106   01/29/24 134/82   12/28/23 134/86          (goal 120/80)    Past Medical History:   Diagnosis Date    Arthritis     Right knee    Elevated hemoglobin (HCC) 4/27/2022    Hyperlipidemia     Hypertension       Past Surgical History:   Procedure Laterality Date    KNEE ARTHROSCOPY Right 6/19/2023    KNEE ARTHROSCOPY PARTIAL MEDIAL MENISCECTOMY and excision of osteochondral loose bodies performed by Jona Ingram MD at [facility] OR    KNEE SURGERY Right     WISDOM TOOTH EXTRACTION Bilateral        Family History   Problem Relation Age of Onset    Mult Sclerosis Mother     Prostate Cancer Father 71    Prostate

## 2024-07-11 DIAGNOSIS — I10 ESSENTIAL HYPERTENSION: ICD-10-CM

## 2024-07-12 RX ORDER — METOPROLOL SUCCINATE 50 MG/1
50 TABLET, EXTENDED RELEASE ORAL DAILY
Qty: 90 TABLET | Refills: 0 | Status: SHIPPED | OUTPATIENT
Start: 2024-07-12

## 2024-09-05 ENCOUNTER — TELEPHONE (OUTPATIENT)
Dept: PRIMARY CARE CLINIC | Age: 54
End: 2024-09-05

## 2024-09-05 NOTE — TELEPHONE ENCOUNTER
----- Message from lorenzo LINWOOD sent at 9/4/2024  1:56 PM EDT -----  Regarding: [facility] Referral Request  E[facility] Referral Request    Reason for referral request: Lab/Test Order    Specialist/Lab/Test patient is requesting (if known):MRI    --------------------------------------------------------------------------------------------------------------------------    Relationship to Patient: Self     Call Back Information: OK to leave message on voicemail  Preferred Call Back Number: Phone [phone]

## 2024-09-19 ENCOUNTER — HOSPITAL ENCOUNTER (OUTPATIENT)
Dept: MRI IMAGING | Age: 54
Discharge: HOME | End: 2024-09-21
Attending: FAMILY MEDICINE
Payer: COMMERCIAL

## 2024-09-19 DIAGNOSIS — G62.9 NEUROPATHY: ICD-10-CM

## 2024-09-19 PROCEDURE — 72148 MRI LUMBAR SPINE W/O DYE: CPT

## 2024-09-20 DIAGNOSIS — M48.00 SPINAL STENOSIS, UNSPECIFIED SPINAL REGION: ICD-10-CM

## 2024-09-20 DIAGNOSIS — R93.89 ABNORMAL MRI: Primary | ICD-10-CM

## 2024-10-01 ENCOUNTER — OFFICE VISIT (OUTPATIENT)
Dept: PRIMARY CARE CLINIC | Age: 54
End: 2024-10-01
Payer: COMMERCIAL

## 2024-10-01 VITALS
BODY MASS INDEX: 38.25 KG/M2 | HEIGHT: 68 IN | DIASTOLIC BLOOD PRESSURE: 84 MMHG | WEIGHT: 252.4 LBS | HEART RATE: 92 BPM | SYSTOLIC BLOOD PRESSURE: 128 MMHG | OXYGEN SATURATION: 97 %

## 2024-10-01 DIAGNOSIS — Z23 NEED FOR VACCINATION: ICD-10-CM

## 2024-10-01 DIAGNOSIS — R06.02 SHORTNESS OF BREATH: ICD-10-CM

## 2024-10-01 DIAGNOSIS — G89.29 CHRONIC PAIN OF BOTH KNEES: ICD-10-CM

## 2024-10-01 DIAGNOSIS — I10 ESSENTIAL (PRIMARY) HYPERTENSION: Primary | ICD-10-CM

## 2024-10-01 DIAGNOSIS — M25.561 CHRONIC PAIN OF BOTH KNEES: ICD-10-CM

## 2024-10-01 DIAGNOSIS — E78.00 HIGH CHOLESTEROL: ICD-10-CM

## 2024-10-01 DIAGNOSIS — M25.562 CHRONIC PAIN OF BOTH KNEES: ICD-10-CM

## 2024-10-01 PROCEDURE — 90471 IMMUNIZATION ADMIN: CPT | Performed by: FAMILY MEDICINE

## 2024-10-01 PROCEDURE — 99213 OFFICE O/P EST LOW 20 MIN: CPT | Performed by: FAMILY MEDICINE

## 2024-10-01 PROCEDURE — 90750 HZV VACC RECOMBINANT IM: CPT | Performed by: FAMILY MEDICINE

## 2024-10-01 PROCEDURE — 3079F DIAST BP 80-89 MM HG: CPT | Performed by: FAMILY MEDICINE

## 2024-10-01 PROCEDURE — 3074F SYST BP LT 130 MM HG: CPT | Performed by: FAMILY MEDICINE

## 2024-10-01 PROCEDURE — 90472 IMMUNIZATION ADMIN EACH ADD: CPT | Performed by: FAMILY MEDICINE

## 2024-10-01 PROCEDURE — 90661 CCIIV3 VAC ABX FR 0.5 ML IM: CPT | Performed by: FAMILY MEDICINE

## 2024-10-01 RX ORDER — ALBUTEROL SULFATE 90 UG/1
2 INHALANT RESPIRATORY (INHALATION) EVERY 6 HOURS PRN
Qty: 18 G | Refills: 3 | Status: SHIPPED | OUTPATIENT
Start: 2024-10-01

## 2024-10-01 NOTE — PROGRESS NOTES
(RELAFEN) 750 MG tablet      4. High cholesterol  Lipid, Fasting      5. Shortness of breath  albuterol sulfate HFA (PROVENTIL;VENTOLIN;PROAIR) 108 (90 Base) MCG/ACT inhaler           Plan:   Assessment & Plan   Flu shot today  Shingrix today  Blood work ordered  Renew medications    Return in about 6 months (around 4/1/2025).    Orders Placed This Encounter   Procedures    Influenza, FLUCELVAX Trivalent, (age 6 mo+) IM, Preservative Free, 0.5mL    Zoster, SHINGRIX, (18 yrs +), IM    Lipid, Fasting     Standing Status:   Future     Standing Expiration Date:   10/1/2025     Orders Placed This Encounter   Medications    nabumetone (RELAFEN) 750 MG tablet     Sig: TAKE 1 TABLET BY MOUTH TWO TIMES A DAY     Dispense:  60 tablet     Refill:  2    albuterol sulfate HFA (PROVENTIL;VENTOLIN;PROAIR) 108 (90 Base) MCG/ACT inhaler     Sig: Inhale 2 puffs into the lungs every 6 hours as needed for Wheezing     Dispense:  18 g     Refill:  3       Patient given educational materials - see patient instructions.  Discussed use, benefit, and side effects of prescribed medications.  All patient questions answered. Pt voiced understanding.Reviewed health maintenance.  Instructed to continue current medications, diet andexercise.  Patient agreed with treatment plan. Follow up as directed.     Electronicallysigned by Vic Clemons MD on 10/7/2024 at 9:43 AM

## 2024-10-09 ENCOUNTER — TELEPHONE (OUTPATIENT)
Dept: PRIMARY CARE CLINIC | Age: 54
End: 2024-10-09

## 2024-10-11 ENCOUNTER — TELEPHONE (OUTPATIENT)
Dept: PRIMARY CARE CLINIC | Age: 54
End: 2024-10-11

## 2024-10-14 ENCOUNTER — OFFICE VISIT (OUTPATIENT)
Dept: NEUROSURGERY | Age: 54
End: 2024-10-14
Payer: COMMERCIAL

## 2024-10-14 VITALS
WEIGHT: 250.8 LBS | HEART RATE: 106 BPM | DIASTOLIC BLOOD PRESSURE: 86 MMHG | BODY MASS INDEX: 38.01 KG/M2 | SYSTOLIC BLOOD PRESSURE: 125 MMHG | HEIGHT: 68 IN

## 2024-10-14 DIAGNOSIS — G95.9 MYELOPATHY (HCC): ICD-10-CM

## 2024-10-14 DIAGNOSIS — R29.2 HOFFMAN REFLEX POSITIVE: ICD-10-CM

## 2024-10-14 DIAGNOSIS — M48.062 LUMBAR STENOSIS WITH NEUROGENIC CLAUDICATION: Primary | ICD-10-CM

## 2024-10-14 PROCEDURE — 99204 OFFICE O/P NEW MOD 45 MIN: CPT | Performed by: NURSE PRACTITIONER

## 2024-10-14 PROCEDURE — 3074F SYST BP LT 130 MM HG: CPT | Performed by: NURSE PRACTITIONER

## 2024-10-14 PROCEDURE — 3079F DIAST BP 80-89 MM HG: CPT | Performed by: NURSE PRACTITIONER

## 2024-10-14 RX ORDER — ASPIRIN 81 MG/1
81 TABLET, CHEWABLE ORAL DAILY
COMMUNITY

## 2024-10-14 NOTE — PROGRESS NOTES
good cap refill  Neuro    Mentation  Appropriate affect  Registration intact  Orientation intact  Judgment intact to situation    Cranial Nerves:   Pupils equal and reactive to light  Extraocular motion intact  Face and shrug symmetric  Tongue midline  No dysarthria  v1-3 sensation symmetric, masseter tone symmetric  Hearing symmetric    Sensation: grossly intact on exam    Motor  L deltoid 5/5; R deltoid 5/5  L biceps 5/5; R biceps 5/5  L triceps 5/5; R triceps 5/5  L wrist extension 5/5; R wrist extension 5/5  L intrinsics 5/5; R intrinsics 5/5     L hip flexion 5/5 , R hip flexion 5/5  L knee extension 5/5; R knee extension 5/5  L Dorsiflexion 5/5; R dorsiflexion 5/5  L Plantarflexion 5/5; R plantarflexion 5/5  L EHL 5/5; R EHL 5/5    Reflexes  L Brachioradialis 2+/4; R brachioradialis 2+/4  L Biceps 2+/4; R Biceps 2+/4  L Triceps 2+/4; R Triceps 2+/4  L Patellar 3+/4: R Patellar 2+/4  L Achilles 3+/4; R Achilles 2+/4    hoffmans L: pos  hoffmans R: slight pos  Clonus L: neg  Clonus R: neg  Babinski L: neg  Babinski R: neg    Studies Review:     MRI lumbar 9/19/2024:  FINDINGS:  BONES/ALIGNMENT: There is normal alignment of the spine. The vertebral body  heights are maintained. The bone marrow signal appears unremarkable.     SPINAL CORD: The conus terminates normally.     SOFT TISSUES: No paraspinal mass identified.     L1-L2: Mild disc space narrowing, posterior disc bulge, facet hypertrophy and  ligamentous thickening.  Mild central canal stenosis.  Mild bilateral  foraminal stenosis.     L2-L3: Mild posterior disc bulge, facet hypertrophy and ligamentous  thickening.  Mild left foraminal stenosis.     L3-L4: Mild posterior disc bulge, facet hypertrophy and ligamentous  thickening.  Mild bilateral foraminal stenosis.     L4-L5: Mild posterior disc bulge and ligamentous thickening.  Moderate/marked  bilateral facet hypertrophy.  Severe central canal stenosis and narrowing of  the lateral recesses worse on

## 2024-10-23 ENCOUNTER — HOSPITAL ENCOUNTER (OUTPATIENT)
Dept: PHYSICAL THERAPY | Facility: CLINIC | Age: 54
Setting detail: THERAPIES SERIES
Discharge: HOME | End: 2024-10-23
Payer: COMMERCIAL

## 2024-10-23 PROCEDURE — 97162 PT EVAL MOD COMPLEX 30 MIN: CPT

## 2024-10-23 PROCEDURE — 97110 THERAPEUTIC EXERCISES: CPT

## 2024-10-23 NOTE — CONSULTS
Biofeedback, first 15 minutes   [address]  [] Biofeedback, additional 15 minutes   [address] [] Dry Needling, 3 or more muscles  [address]         Frequency:  2 x/week for 16 visits    Today’s Treatment:  Modalities: none  Precautions [x] No  [] Yes:   Exercises:  Exercise Reps/ Time Weight/ Level Comments   TrA Activation 5x5\"     TrA Activation with Heel Slides 5x  BOLIVAR LEs   Clamshells 10x orange L LE         SL hip abduction -     DKTC -     Quadruped  -     Supine Marches -     Pelvic Tilting  -     Hip Flexor Stretch  -     Hamstring Stretch  -     Other:    Specific Instructions for next treatment: modalities prn, progress hip and abdominal strengthening to pt tolerance       Evaluation Complexity:  History (Personal factors, comorbidities) [] 0 [x] 1-2 [] 3+   Exam (limitations, restrictions) [] 1-2 [x] 3 [] 4+   Clinical presentation (progression) [] Stable [x] Evolving  [] Unstable   Decision Making [] Low [x] Moderate [] High    [] Low Complexity [x] Moderate Complexity [] High Complexity       Treatment Charges: Mins Units Time In/Out    Evaluation         Low         Moderate         High  63  1  9417-5620 & 0351-9304     Modalities           Ther Exercise  10  1  7484-8737     Neuromuscular Re-ed           Gait Training           Manual Therapy           Ther Activities           Aquatics           Vasocompression           Cervical Traction           Other         Total Billable time 63 min  2  9503-2734        TOTAL TREATMENT TIME: 63    Time in: 1724      Time out: 1637    Electronically signed by: JANN Malcolm    The supervising therapist was present in the room directing the student during the provision of care.  The supervising therapist made the skilled judgements and was responsible for assessments and treatments. The above documentation was reviewed and accepted by supervising Clinical Instructor, Didi Yoo, PT.      Physician

## 2024-10-28 ENCOUNTER — INITIAL CONSULT (OUTPATIENT)
Dept: PAIN MANAGEMENT | Age: 54
End: 2024-10-28
Payer: COMMERCIAL

## 2024-10-28 VITALS — BODY MASS INDEX: 37.89 KG/M2 | WEIGHT: 250 LBS | HEIGHT: 68 IN

## 2024-10-28 DIAGNOSIS — M54.17 LUMBOSACRAL NEURITIS: Primary | ICD-10-CM

## 2024-10-28 DIAGNOSIS — M48.061 SPINAL STENOSIS OF LUMBAR REGION, UNSPECIFIED WHETHER NEUROGENIC CLAUDICATION PRESENT: ICD-10-CM

## 2024-10-28 DIAGNOSIS — M47.817 LUMBOSACRAL SPONDYLOSIS WITHOUT MYELOPATHY: ICD-10-CM

## 2024-10-28 PROCEDURE — 99204 OFFICE O/P NEW MOD 45 MIN: CPT | Performed by: PAIN MEDICINE

## 2024-10-28 ASSESSMENT — ENCOUNTER SYMPTOMS: BACK PAIN: 1

## 2024-10-28 NOTE — PROGRESS NOTES
HPI:     Back Pain  This is a chronic problem. The current episode started more than 1 year ago. The problem occurs constantly. The problem is unchanged. The pain is present in the lumbar spine and gluteal. The quality of the pain is described as aching and cramping. The pain radiates to the right knee and left knee. The pain is at a severity of 4/10. The pain is mild. The pain is The same all the time. The symptoms are aggravated by bending, twisting and position. Stiffness is present All day. Associated symptoms include numbness and tingling. He has tried analgesics for the symptoms. The treatment provided mild relief.     Pain in the low back down the legs.  MRI with degenerative changes and stenosis.  He has seen the surgical team.  Nothing surgical planned.    Pain ranges from a 4/10 to a 8/10 depending on activity.    Patient denies any new neurological symptoms. No bowel or bladder incontinence, no weakness, and no falling.    Review of OARRS does not show any aberrant prescription behavior.     Past Medical History:   Diagnosis Date    Arthritis     Right knee    Elevated hemoglobin (HCC) 4/27/2022    Hyperlipidemia     Hypertension        Past Surgical History:   Procedure Laterality Date    KNEE ARTHROSCOPY Right 6/19/2023    KNEE ARTHROSCOPY PARTIAL MEDIAL MENISCECTOMY and excision of osteochondral loose bodies performed by Jona Ingram MD at [facility] OR    KNEE SURGERY Right     WISDOM TOOTH EXTRACTION Bilateral        No Known Allergies      Current Outpatient Medications:     aspirin 81 MG chewable tablet, Take 1 tablet by mouth daily, Disp: , Rfl:     nabumetone (RELAFEN) 750 MG tablet, TAKE 1 TABLET BY MOUTH TWO TIMES A DAY (Patient taking differently: As needed), Disp: 60 tablet, Rfl: 2    albuterol sulfate HFA (PROVENTIL;VENTOLIN;PROAIR) 108 (90 Base) MCG/ACT inhaler, Inhale 2 puffs into the lungs every 6 hours as needed for Wheezing, Disp: 18 g, Rfl: 3    metoprolol succinate (TOPROL XL) 50 MG

## 2024-11-05 ENCOUNTER — HOSPITAL ENCOUNTER (OUTPATIENT)
Dept: PHYSICAL THERAPY | Facility: CLINIC | Age: 54
Setting detail: THERAPIES SERIES
Discharge: HOME | End: 2024-11-05
Payer: COMMERCIAL

## 2024-11-05 PROCEDURE — 97110 THERAPEUTIC EXERCISES: CPT

## 2024-11-05 NOTE — FLOWSHEET NOTE
[] [facility]  Outpatient Rehabilitation &  Therapy  [address]  [phone]  [phone] [] [facility]  Outpatient Rehabilitation &  Therapy  [address]  P: [phone]  F: [phone] [x] [facility]  Outpatient Rehabilitation &  Therapy  [address]  P: [phone]  F: [phone] [] [facility]  Outpatient Rehabilitation &  Therapy  [address]  [phone]  [phone] [] [facility]  Outpatient Rehabilitation &  Therapy  [address]   P: [phone]  F: [phone]  [] [facility]  Outpatient Rehabilitation &  Therapy  [address]  P: [phone]  F: [phone] [] [facility]  Outpatient Rehabilitation &  Therapy  [address]  P: [phone]  F: [phone] [] [facility]  Outpatient Rehabilitation &  Therapy  [address]  P: [phone]  F: [phone] [] [facility]  Outpatient Rehabilitation &  Therapy  [address]  P: [phone]  F: [phone]  [] [facility] Outpatient Rehabilitation &  Therapy  [address]  P: [phone]  F: [phone]     Physical Therapy Daily Treatment Note    Date:  2024  Patient Name:  Christiano Wade    :  1970  MRN: [id number]  Physician: Monae Cowan, CARSON - MIRIAM                           Insurance: [employer] CHOICE FUND OA, 60 visit hard max, 60 visits remaining, no auth required. CPT codes verified: 75766, 65357, 88286, 61503, 81224, 48610 Excluded: VASO 30582; DRY NEEDLING ,   Medical Diagnosis: M48.062 (ICD-10-CM) - Lumbar stenosis with neurogenic claudication, G95.9 (ICD-10-CM) - Myelopathy (HCC)                     Rehab Codes: M54.5, M54.50   Onset Date: May 23, 2022                Next 's appt.: 2024 (neurosurgery),

## 2024-11-07 ENCOUNTER — HOSPITAL ENCOUNTER (OUTPATIENT)
Dept: PHYSICAL THERAPY | Facility: CLINIC | Age: 54
Setting detail: THERAPIES SERIES
Discharge: HOME | End: 2024-11-07
Payer: COMMERCIAL

## 2024-11-07 PROCEDURE — 97110 THERAPEUTIC EXERCISES: CPT

## 2024-11-07 NOTE — FLOWSHEET NOTE
10/28/2024 (pain management)  Visit# / total visits: 3/16     Cancels/No Shows: 0/0    Subjective:    Pain:  [] Yes  [x] No Location: LB, B LE Pain Rating: (0-10 scale) 0/10  Pain altered Tx:  [x] No  [] Yes  Action:  Comments: Pt reports no issues since last treatment. No episodes of B LE feeling numb.     Objective:  Modalities:   Precautions [x] No  [] Yes:   Exercises:  Exercise Reps/ Time Weight/ Level Comments   Nustep  7m L4          Step HS stretch 2x1'     Piriformis stretch 3x30\"  Knee to opp shoulder   Hip flexor stretch 2x30\"  1/2 kneeling   Ball roll out 10x5\"           TrA 5x5\"     TrA march 2x10     TrA UE/LE x10     TrA SLR x10           SL clamshell x20 orange    SL hip ABD x20 orange          Quadruped TA 10x5\"     Quadruped UE's x10     Step ups 2x10 4\" Forward and lat                                       Other:    Specific Instructions for next treatment: modalities prn, progress hip and abdominal strengthening to pt tolerance      Treatment Charges: Mins Units Time In/Out   []  Modalities        [x]  Ther Exercise 40 3 1239-3811   []  Neuromuscular Re-ed      []  Gait Training      []  Manual Therapy      []  Ther Activities      []  Aquatics      []  Vasocompression      []  Cervical Traction      []  Other      Total Billable time 45 min 3           Assessment: [x] Progressing toward goals. Nustep warm up followed by stretching to decrease tension and promote tissue extensibility. Progressed ex per above log with increased reps and addition of quadruped TA. Pt reports fatigue after, but improved tolerance since last session. Updated HEP below. WIll progress as brooks    [] No change.     [] Other:  [x] Patient would continue to benefit from skilled physical therapy services in order to: improve upon deficits listed above that are affecting his ability to complete household tasks such as standing at kitchen sick for a period of time, sit for a period of time, or walk for a distance without

## 2024-11-12 ENCOUNTER — HOSPITAL ENCOUNTER (OUTPATIENT)
Dept: PHYSICAL THERAPY | Facility: CLINIC | Age: 54
Setting detail: THERAPIES SERIES
Discharge: HOME | End: 2024-11-12
Payer: COMMERCIAL

## 2024-11-12 PROCEDURE — 97110 THERAPEUTIC EXERCISES: CPT

## 2024-11-12 NOTE — FLOWSHEET NOTE
- 10 reps  - Clamshell with Resistance  - 1 x daily - 7 x weekly - 2 sets - 10 reps  - Sidelying Hip Abduction with Resistance at Thighs  - 1 x daily - 7 x weekly - 2 sets - 10 reps  - Standing Hamstring Stretch on Chair  - 1 x daily - 7 x weekly - 1 sets - 3 reps - 30 hold  - Supine Piriformis Stretch with Foot on Ground  - 1 x daily - 7 x weekly - 1 sets - 3 reps - 30 hold  - Quadruped Transversus Abdominis Bracing  - 1 x daily - 7 x weekly - 1 sets - 10 reps - 5\" hold      Plan: [x] Continue current frequency toward long and short term goals.        Time In: 5:00           Time Out: 6:00    Electronically signed by:  Charleen Pride PTA

## 2024-11-18 ENCOUNTER — HOSPITAL ENCOUNTER (OUTPATIENT)
Dept: PHYSICAL THERAPY | Facility: CLINIC | Age: 54
Setting detail: THERAPIES SERIES
Discharge: HOME | End: 2024-11-18
Payer: COMMERCIAL

## 2024-11-18 PROCEDURE — 97110 THERAPEUTIC EXERCISES: CPT

## 2024-11-18 NOTE — FLOWSHEET NOTE
[] [facility]  Outpatient Rehabilitation &  Therapy  [address]  [phone]  [phone] [] [facility]  Outpatient Rehabilitation &  Therapy  [address]  P: [phone]  F: [phone] [x] [facility]  Outpatient Rehabilitation &  Therapy  [address]  P: [phone]  F: [phone] [] [facility]  Outpatient Rehabilitation &  Therapy  [address]  [phone]  [phone] [] [facility]  Outpatient Rehabilitation &  Therapy  [address]   P: [phone]  F: [phone]  [] [facility]  Outpatient Rehabilitation &  Therapy  [address]  P: [phone]  F: [phone] [] [facility]  Outpatient Rehabilitation &  Therapy  [address]  P: [phone]  F: [phone] [] [facility]  Outpatient Rehabilitation &  Therapy  [address]  P: [phone]  F: [phone] [] [facility]  Outpatient Rehabilitation &  Therapy  [address]  P: [phone]  F: [phone]  [] [facility] Outpatient Rehabilitation &  Therapy  [address]  P: [phone]  F: [phone]     Physical Therapy Daily Treatment Note    Date:  2024  Patient Name:  Christiano Wade    :  1970  MRN: [id number]  Physician: Monae Cowan, CARSON - MIRIAM                           Insurance: [employer] CHOICE FUND OA, 60 visit hard max, 60 visits remaining, no auth required. CPT codes verified: 12249, 11625, 35451, 55630, 71152, 75191 Excluded: VASO 80188; DRY NEEDLING ,   Medical Diagnosis: M48.062 (ICD-10-CM) - Lumbar stenosis with neurogenic claudication, G95.9 (ICD-10-CM) - Myelopathy (HCC)                     Rehab Codes: M54.5, M54.50   Onset Date: May 23, 2022                Next 's appt.: 2024 (neurosurgery),

## 2024-11-21 ENCOUNTER — HOSPITAL ENCOUNTER (OUTPATIENT)
Dept: PHYSICAL THERAPY | Facility: CLINIC | Age: 54
Setting detail: THERAPIES SERIES
Discharge: HOME | End: 2024-11-21
Payer: COMMERCIAL

## 2024-11-21 PROCEDURE — 97110 THERAPEUTIC EXERCISES: CPT

## 2024-11-21 NOTE — FLOWSHEET NOTE
Updated HEP and issued orange tband.     [] No change.     [] Other:  [x] Patient would continue to benefit from skilled physical therapy services in order to: improve upon deficits listed above that are affecting his ability to complete household tasks such as standing at kitchen sick for a period of time, sit for a period of time, or walk for a distance without compensations and increased fall risk     STG: (to be met in 8 treatments)  ? Pain: </= 3/10 pain in LB to improve his tolerance to extended positions.   ? ROM: Improve L side bending to >/= 56cm from the floor to improve his ability to  objects from the floor.   ? Strength: Improve erector spinae strength to 3+/5 to improve tolerance to extended standing and sitting positions.   ? Strength: Improve L hip abduction to >/= 4+ to improve ability to ambulate without compensated movement patterns.   Patient to be independent with home exercise program as demonstrated by performance with correct form without cues.  LTG: (to be met in 16 treatments)  Pt to improve score on the Modified Oswestry to </= 7/50 to improve ability to complete ADL's.   Per pt report, he will have reduced \"clusters\" or flare up's in symptoms causing sudden weakness in BOLIVAR LE's to improve tolerance to extended walking without increased fall risk.     Pt. Education:  [x] Yes  [] No  [] Reviewed Prior HEP/Ed  Method of Education: [x] Verbal  [x] Demo  [] Written  Comprehension of Education:  [x] Verbalizes understanding.  [x] Demonstrates understanding.  [] Needs review.  [] Demonstrates/verbalizes HEP/Ed previously given.     Access Code: [email or web address]  URL: [email or web address]  Date: 11/07/2024  Prepared by: Didi Pride    Exercises  - Supine Transversus Abdominis Bracing with Heel Slide  - 1 x daily - 7 x weekly - 5 reps - 5 seconds holdAccess Code: [email or web address]  URL: [email or web address]  Date: 11/21/2024  Prepared by: Didi Pride    Exercises  - Clamshell with

## 2024-11-26 ENCOUNTER — HOSPITAL ENCOUNTER (OUTPATIENT)
Dept: PHYSICAL THERAPY | Facility: CLINIC | Age: 54
Setting detail: THERAPIES SERIES
Discharge: HOME | End: 2024-11-26
Payer: COMMERCIAL

## 2024-11-26 PROCEDURE — 97110 THERAPEUTIC EXERCISES: CPT

## 2024-11-26 NOTE — FLOWSHEET NOTE
[] [facility]  Outpatient Rehabilitation &  Therapy  [address]  [phone]  [phone] [] [facility]  Outpatient Rehabilitation &  Therapy  [address]  P: [phone]  F: [phone] [x] [facility]  Outpatient Rehabilitation &  Therapy  [address]  P: [phone]  F: [phone] [] [facility]  Outpatient Rehabilitation &  Therapy  [address]  [phone]  [phone] [] [facility]  Outpatient Rehabilitation &  Therapy  [address]   P: [phone]  F: [phone]  [] [facility]  Outpatient Rehabilitation &  Therapy  [address]  P: [phone]  F: [phone] [] [facility]  Outpatient Rehabilitation &  Therapy  [address]  P: [phone]  F: [phone] [] [facility]  Outpatient Rehabilitation &  Therapy  [address]  P: [phone]  F: [phone] [] [facility]  Outpatient Rehabilitation &  Therapy  [address]  P: [phone]  F: [phone]  [] [facility] Outpatient Rehabilitation &  Therapy  [address]  P: [phone]  F: [phone]     Physical Therapy Daily Treatment Note    Date:  2024  Patient Name:  Christiano Wade    :  1970  MRN: [id number]  Physician: Monae Cowan, CARSON - MIRIAM                           Insurance: [employer] CHOICE FUND OA, 60 visit hard max, 60 visits remaining, no auth required. CPT codes verified: 93380, 32805, 03606, 45523, 78276, 03323 Excluded: VASO 25582; DRY NEEDLING ,   Medical Diagnosis: M48.062 (ICD-10-CM) - Lumbar stenosis with neurogenic claudication, G95.9 (ICD-10-CM) - Myelopathy (HCC)                     Rehab Codes: M54.5, M54.50   Onset Date: May 23, 2022                Next 's appt.: 2024 (neurosurgery),

## 2024-11-29 ENCOUNTER — HOSPITAL ENCOUNTER (OUTPATIENT)
Dept: PHYSICAL THERAPY | Facility: CLINIC | Age: 54
Setting detail: THERAPIES SERIES
Discharge: HOME | End: 2024-11-29
Payer: COMMERCIAL

## 2024-11-29 PROCEDURE — 97110 THERAPEUTIC EXERCISES: CPT

## 2024-11-29 NOTE — FLOWSHEET NOTE
[] [facility]  Outpatient Rehabilitation &  Therapy  [address]  [phone]  [phone] [] [facility]  Outpatient Rehabilitation &  Therapy  [address]  P: [phone]  F: [phone] [x] [facility]  Outpatient Rehabilitation &  Therapy  [address]  P: [phone]  F: [phone] [] [facility]  Outpatient Rehabilitation &  Therapy  [address]  [phone]  [phone] [] [facility]  Outpatient Rehabilitation &  Therapy  [address]   P: [phone]  F: [phone]  [] [facility]  Outpatient Rehabilitation &  Therapy  [address]  P: [phone]  F: [phone] [] [facility]  Outpatient Rehabilitation &  Therapy  [address]  P: [phone]  F: [phone] [] [facility]  Outpatient Rehabilitation &  Therapy  [address]  P: [phone]  F: [phone] [] [facility]  Outpatient Rehabilitation &  Therapy  [address]  P: [phone]  F: [phone]  [] [facility] Outpatient Rehabilitation &  Therapy  [address]  P: [phone]  F: [phone]     Physical Therapy Daily Treatment Note    Date:  2024  Patient Name:  Christiano Wade    :  1970  MRN: [id number]  Physician: Monae Cowan, CARSON - MIRIAM                           Insurance: [employer] CHOICE FUND OA, 60 visit hard max, 60 visits remaining, no auth required. CPT codes verified: 12253, 15339, 83474, 88634, 83317, 51300 Excluded: VASO 71417; DRY NEEDLING ,   Medical Diagnosis: M48.062 (ICD-10-CM) - Lumbar stenosis with neurogenic claudication, G95.9 (ICD-10-CM) - Myelopathy (HCC)                     Rehab Codes: M54.5, M54.50   Onset Date: May 23, 2022                Next 's appt.: 2024 (neurosurgery),

## 2024-12-02 ENCOUNTER — HOSPITAL ENCOUNTER (OUTPATIENT)
Dept: PHYSICAL THERAPY | Facility: CLINIC | Age: 54
Setting detail: THERAPIES SERIES
Discharge: HOME | End: 2024-12-02
Payer: COMMERCIAL

## 2024-12-02 PROCEDURE — 97110 THERAPEUTIC EXERCISES: CPT

## 2024-12-02 NOTE — PROGRESS NOTES
- green          Other: MFR R LB, paraspinals - held    Specific Instructions for next treatment: modalities prn, progress hip and abdominal strengthening to pt tolerance      Treatment Charges: Mins Units Time In/Out   []  Modalities        [x]  Ther Exercise 60 4 3408-6248   []  Neuromuscular Re-ed      []  Gait Training      []  Manual Therapy      []  Ther Activities      []  Aquatics      []  Vasocompression      []  Cervical Traction      []  Other      Total Billable time 60 4           Assessment: [x] Progressing toward goals. Pt with increased difficulty with quadruped exercises due to reports of increased weakness of L side of body. Pt tolerated treatment well with reports of increased fatigue upon completion. Minimal verbal cueing provided to reduce hip ER during hip abduction to further isolate hip abduction musculature. Pt tolerated treatment well.     [] No change.     [x] Other: Pt has been seen for 6 visits between the dates of 10/23/2024 and 12/2/2024 which has consisted of therapeutic exercise. Pt has met 3/5 STG's and 0/2 LTG's. Pt with improvements in pain, lumbar lateral flexion ROM, erector spinae strength. Pt however continues to have deficits in L sided strength with ongoing numbness and tingling.   [x] Patient would continue to benefit from skilled physical therapy services in order to: improve upon deficits listed above that are affecting his ability to complete household tasks such as standing at kitchen sick for a period of time, sit for a period of time, or walk for a distance without compensations and increased fall risk     STG: (to be met in 8 treatments) Reassessed on 12/2/24  ? Pain: </= 3/10 pain in LB to improve his tolerance to extended positions. Not Met, 7-8/10 pain due to low back spasm  ? ROM: Improve L side bending to </= 56cm from the floor to improve his ability to  objects from the floor. Met, 45cm from floor on 12/2/24  ? Strength: Improve erector spinae strength

## 2024-12-05 ENCOUNTER — HOSPITAL ENCOUNTER (OUTPATIENT)
Dept: PHYSICAL THERAPY | Facility: CLINIC | Age: 54
Setting detail: THERAPIES SERIES
Discharge: HOME | End: 2024-12-05
Payer: COMMERCIAL

## 2024-12-05 PROCEDURE — 97110 THERAPEUTIC EXERCISES: CPT

## 2024-12-05 NOTE — FLOWSHEET NOTE
[] [facility]  Outpatient Rehabilitation &  Therapy  [address]  [phone]  [phone] [] [facility]  Outpatient Rehabilitation &  Therapy  [address]  P: [phone]  F: [phone] [x] [facility]  Outpatient Rehabilitation &  Therapy  [address]  P: [phone]  F: [phone] [] [facility]  Outpatient Rehabilitation &  Therapy  [address]  [phone]  [phone] [] [facility]  Outpatient Rehabilitation &  Therapy  [address]   P: [phone]  F: [phone]  [] [facility]  Outpatient Rehabilitation &  Therapy  [address]  P: [phone]  F: [phone] [] [facility]  Outpatient Rehabilitation &  Therapy  [address]  P: [phone]  F: [phone] [] [facility]  Outpatient Rehabilitation &  Therapy  [address]  P: [phone]  F: [phone] [] [facility]  Outpatient Rehabilitation &  Therapy  [address]  P: [phone]  F: [phone]  [] [facility] Outpatient Rehabilitation &  Therapy  [address]  P: [phone]  F: [phone]     Physical Therapy Daily Treatment Note    Date:  2024  Patient Name:  Christiano Wade    :  1970  MRN: [id number]  Physician: Monae Cowan, CARSON - MIRIAM                           Insurance: [employer] CHOICE FUND OA, 60 visit hard max, 60 visits remaining, no auth required. CPT codes verified: 17452, 51334, 49152, 91170, 42760, 21868 Excluded: VASO 97747; DRY NEEDLING ,   Medical Diagnosis: M48.062 (ICD-10-CM) - Lumbar stenosis with neurogenic claudication, G95.9 (ICD-10-CM) - Myelopathy (HCC)                     Rehab Codes: M54.5, M54.50   Onset Date: May 23, 2022                Next 's appt.: 2024 (neurosurgery),

## 2024-12-10 ENCOUNTER — HOSPITAL ENCOUNTER (OUTPATIENT)
Dept: PHYSICAL THERAPY | Facility: CLINIC | Age: 54
Setting detail: THERAPIES SERIES
Discharge: HOME | End: 2024-12-10
Payer: COMMERCIAL

## 2024-12-10 PROCEDURE — 97110 THERAPEUTIC EXERCISES: CPT

## 2024-12-11 ENCOUNTER — HOSPITAL ENCOUNTER (OUTPATIENT)
Age: 54
Discharge: HOME | End: 2024-12-13
Payer: COMMERCIAL

## 2024-12-11 ENCOUNTER — HOSPITAL ENCOUNTER (OUTPATIENT)
Dept: GENERAL RADIOLOGY | Age: 54
Discharge: HOME | End: 2024-12-13
Payer: COMMERCIAL

## 2024-12-11 DIAGNOSIS — M48.062 LUMBAR STENOSIS WITH NEUROGENIC CLAUDICATION: ICD-10-CM

## 2024-12-11 DIAGNOSIS — G95.9 MYELOPATHY (HCC): ICD-10-CM

## 2024-12-11 DIAGNOSIS — R29.2 HOFFMAN REFLEX POSITIVE: ICD-10-CM

## 2024-12-11 PROCEDURE — 72050 X-RAY EXAM NECK SPINE 4/5VWS: CPT

## 2024-12-11 PROCEDURE — 72110 X-RAY EXAM L-2 SPINE 4/>VWS: CPT

## 2024-12-12 ENCOUNTER — HOSPITAL ENCOUNTER (OUTPATIENT)
Dept: PHYSICAL THERAPY | Facility: CLINIC | Age: 54
Setting detail: THERAPIES SERIES
Discharge: HOME | End: 2024-12-12
Payer: COMMERCIAL

## 2024-12-12 PROCEDURE — 97110 THERAPEUTIC EXERCISES: CPT

## 2024-12-12 NOTE — FLOWSHEET NOTE
[] [facility]  Outpatient Rehabilitation &  Therapy  [address]  [phone]  [phone] [] [facility]  Outpatient Rehabilitation &  Therapy  [address]  P: [phone]  F: [phone] [x] [facility]  Outpatient Rehabilitation &  Therapy  [address]  P: [phone]  F: [phone] [] [facility]  Outpatient Rehabilitation &  Therapy  [address]  [phone]  [phone] [] [facility]  Outpatient Rehabilitation &  Therapy  [address]   P: [phone]  F: [phone]  [] [facility]  Outpatient Rehabilitation &  Therapy  [address]  P: [phone]  F: [phone] [] [facility]  Outpatient Rehabilitation &  Therapy  [address]  P: [phone]  F: [phone] [] [facility]  Outpatient Rehabilitation &  Therapy  [address]  P: [phone]  F: [phone] [] [facility]  Outpatient Rehabilitation &  Therapy  [address]  P: [phone]  F: [phone]  [] [facility] Outpatient Rehabilitation &  Therapy  [address]  P: [phone]  F: [phone]     Physical Therapy Daily Treatment Note    Date:  2024  Patient Name:  Christiano Wade    :  1970  MRN: [id number]  Physician: Monae Cowan, CARSON - MIRIAM                           Insurance: [employer] CHOICE FUND OA, 60 visit hard max, 60 visits remaining, no auth required. CPT codes verified: 22891, 51413, 96689, 85759, 84975, 08289 Excluded: VASO 52148; DRY NEEDLING ,   Medical Diagnosis: M48.062 (ICD-10-CM) - Lumbar stenosis with neurogenic claudication, G95.9 (ICD-10-CM) - Myelopathy (HCC)                     Rehab Codes: M54.5, M54.50   Onset Date: May 23, 2022                Next 's appt.: 2024 (neurosurgery),

## 2024-12-17 ENCOUNTER — HOSPITAL ENCOUNTER (OUTPATIENT)
Dept: PHYSICAL THERAPY | Facility: CLINIC | Age: 54
Setting detail: THERAPIES SERIES
Discharge: HOME | End: 2024-12-17
Payer: COMMERCIAL

## 2024-12-17 PROCEDURE — 97110 THERAPEUTIC EXERCISES: CPT

## 2024-12-17 NOTE — FLOWSHEET NOTE
[] [facility]  Outpatient Rehabilitation &  Therapy  [address]  [phone]  [phone] [] [facility]  Outpatient Rehabilitation &  Therapy  [address]  P: [phone]  F: [phone] [x] [facility]  Outpatient Rehabilitation &  Therapy  [address]  P: [phone]  F: [phone] [] [facility]  Outpatient Rehabilitation &  Therapy  [address]  [phone]  [phone] [] [facility]  Outpatient Rehabilitation &  Therapy  [address]   P: [phone]  F: [phone]  [] [facility]  Outpatient Rehabilitation &  Therapy  [address]  P: [phone]  F: [phone] [] [facility]  Outpatient Rehabilitation &  Therapy  [address]  P: [phone]  F: [phone] [] [facility]  Outpatient Rehabilitation &  Therapy  [address]  P: [phone]  F: [phone] [] [facility]  Outpatient Rehabilitation &  Therapy  [address]  P: [phone]  F: [phone]  [] [facility] Outpatient Rehabilitation &  Therapy  [address]  P: [phone]  F: [phone]     Physical Therapy Daily Treatment Note    Date:  2024  Patient Name:  Christiano Wade    :  1970  MRN: [id number]  Physician: Monae Cowan, CARSON - MIRIAM                           Insurance: [employer] CHOICE FUND OA, 60 visit hard max, 60 visits remaining, no auth required. CPT codes verified: 55010, 86739, 10147, 00835, 70283, 27491 Excluded: VASO 81531; DRY NEEDLING ,   Medical Diagnosis: M48.062 (ICD-10-CM) - Lumbar stenosis with neurogenic claudication, G95.9 (ICD-10-CM) - Myelopathy (HCC)                     Rehab Codes: M54.5, M54.50   Onset Date: May 23, 2022                Next 's appt.: 2024 (neurosurgery),

## 2024-12-19 ENCOUNTER — HOSPITAL ENCOUNTER (OUTPATIENT)
Dept: PHYSICAL THERAPY | Facility: CLINIC | Age: 54
Setting detail: THERAPIES SERIES
Discharge: HOME | End: 2024-12-19
Payer: COMMERCIAL

## 2024-12-19 PROCEDURE — 97110 THERAPEUTIC EXERCISES: CPT

## 2024-12-19 NOTE — FLOWSHEET NOTE
[] [facility]  Outpatient Rehabilitation &  Therapy  [address]  [phone]  [phone] [] [facility]  Outpatient Rehabilitation &  Therapy  [address]  P: [phone]  F: [phone] [x] [facility]  Outpatient Rehabilitation &  Therapy  [address]  P: [phone]  F: [phone] [] [facility]  Outpatient Rehabilitation &  Therapy  [address]  [phone]  [phone] [] [facility]  Outpatient Rehabilitation &  Therapy  [address]   P: [phone]  F: [phone]  [] [facility]  Outpatient Rehabilitation &  Therapy  [address]  P: [phone]  F: [phone] [] [facility]  Outpatient Rehabilitation &  Therapy  [address]  P: [phone]  F: [phone] [] [facility]  Outpatient Rehabilitation &  Therapy  [address]  P: [phone]  F: [phone] [] [facility]  Outpatient Rehabilitation &  Therapy  [address]  P: [phone]  F: [phone]  [] [facility] Outpatient Rehabilitation &  Therapy  [address]  P: [phone]  F: [phone]     Physical Therapy Daily Treatment Note    Date:  2024  Patient Name:  Christiano Wade    :  1970  MRN: [id number]  Physician: Monae Cowan, CARSON - MIRIAM                           Insurance: [employer] CHOICE FUND OA, 60 visit hard max, 60 visits remaining, no auth required. CPT codes verified: 99063, 34511, 27295, 87803, 40566, 83574 Excluded: VASO 02865; DRY NEEDLING ,   Medical Diagnosis: M48.062 (ICD-10-CM) - Lumbar stenosis with neurogenic claudication, G95.9 (ICD-10-CM) - Myelopathy (HCC)                     Rehab Codes: M54.5, M54.50   Onset Date: May 23, 2022                Next 's appt.: 2024 (neurosurgery),

## 2024-12-23 ENCOUNTER — HOSPITAL ENCOUNTER (OUTPATIENT)
Dept: PHYSICAL THERAPY | Facility: CLINIC | Age: 54
Setting detail: THERAPIES SERIES
Discharge: HOME | End: 2024-12-23
Payer: COMMERCIAL

## 2024-12-23 PROCEDURE — 97110 THERAPEUTIC EXERCISES: CPT

## 2024-12-23 NOTE — FLOWSHEET NOTE
Didi Pride    Exercises  - Standing Hamstring Stretch on Chair  - 1 x daily - 7 x weekly - 1 sets - 3 reps - 30 hold  - Kneeling Hip Flexor Stretch  - 1 x daily - 7 x weekly - 1 sets - 3 reps - 30\" hold  - Supine Piriformis Stretch with Foot on Ground  - 1 x daily - 7 x weekly - 1 sets - 3 reps - 30 hold  - Supine Lower Trunk Rotation  - 1 x daily - 7 x weekly - 1 sets - 10 reps - 5\" hold  - Active Straight Leg Raise with Quad Set  - 1 x daily - 7 x weekly - 3 sets - 10 reps  - Dead Bug  - 1 x daily - 7 x weekly - 2 sets - 10 reps  - Sidelying Hip Abduction with Resistance at Thighs  - 1 x daily - 7 x weekly - 2 sets - 10 reps  - Hip Abduction with Resistance Loop  - 1 x daily - 7 x weekly - 1 sets - 10 reps  - Standing Hip Flexion with Resistance Loop  - 1 x daily - 7 x weekly - 1 sets - 10 reps  - Hip Extension with Resistance Loop  - 1 x daily - 7 x weekly - 1 sets - 10 reps  - Side Stepping with Resistance at Ankles  - 1 x daily - 7 x weekly - 1 sets - 20 reps  - Squat with Chair Touch  - 1 x daily - 7 x weekly - 2 sets - 10 reps      Plan: [x] Continue current frequency toward long and short term goals.        Time In:  1400          Time Out: 1450    Electronically signed by:  Amrik Schwarz PTA

## 2024-12-26 ENCOUNTER — APPOINTMENT (OUTPATIENT)
Dept: PHYSICAL THERAPY | Facility: CLINIC | Age: 54
End: 2024-12-26
Payer: COMMERCIAL

## 2024-12-30 ENCOUNTER — HOSPITAL ENCOUNTER (OUTPATIENT)
Dept: PHYSICAL THERAPY | Facility: CLINIC | Age: 54
Setting detail: THERAPIES SERIES
Discharge: HOME | End: 2024-12-30
Payer: COMMERCIAL

## 2024-12-30 NOTE — FLOWSHEET NOTE
[] [facility]  Outpatient Rehabilitation &  Therapy  [address]  [phone]  [phone] [] [facility]  Outpatient Rehabilitation &  Therapy  [address]  P: [phone]  F: [phone] [x] [facility]  Outpatient Rehabilitation &  Therapy  [address]  P: [phone]  F: [phone] [] [facility]  Outpatient Rehabilitation &  Therapy  [address]  [phone]  [phone] [] [facility]  Outpatient Rehabilitation &  Therapy  [address]   P: [phone]  F: [phone]  [] [facility]  Outpatient Rehabilitation &  Therapy  [address]  P: [phone]  F: [phone] [] [facility]  Outpatient Rehabilitation &  Therapy  [address]  P: [phone]  F: [phone] [] [facility]  Outpatient Rehabilitation &  Therapy  [address]  P: [phone]  F: [phone] [] [facility]  Outpatient Rehabilitation &  Therapy  [address]  P: [phone]  F: [phone]  [] [facility] Outpatient Rehabilitation &  Therapy  [address]  P: [phone]  F: [phone]     Therapy Cancel/No Show note    Date: 2024  Patient: Christiano Wade  : 1970  MRN: [id number]    Cancels/No Shows to date: 1    For today's appointment patient:    [x]  Cancelled    [] Rescheduled appointment    [] No-show     Reason given by patient:    [x]  Patient ill    []  Conflicting appointment    [] No transportation      [] Conflict with work    [] No reason given    [] Weather related    [] COVID-19    [] Other:      Comments:  Patient will call back to reschedule      [] Next appointment was confirmed    Electronically signed by: Didi Yoo PT

## 2025-01-02 DIAGNOSIS — I10 ESSENTIAL (PRIMARY) HYPERTENSION: ICD-10-CM

## 2025-01-02 DIAGNOSIS — I10 ESSENTIAL HYPERTENSION: ICD-10-CM

## 2025-01-02 RX ORDER — METOPROLOL SUCCINATE 50 MG/1
50 TABLET, EXTENDED RELEASE ORAL DAILY
Qty: 90 TABLET | Refills: 3 | Status: SHIPPED | OUTPATIENT
Start: 2025-01-02

## 2025-01-02 RX ORDER — TELMISARTAN 80 MG/1
80 TABLET ORAL DAILY
Qty: 90 TABLET | Refills: 3 | Status: SHIPPED | OUTPATIENT
Start: 2025-01-02

## 2025-01-06 ENCOUNTER — OFFICE VISIT (OUTPATIENT)
Dept: PAIN MANAGEMENT | Age: 55
End: 2025-01-06
Payer: COMMERCIAL

## 2025-01-06 VITALS — BODY MASS INDEX: 37.89 KG/M2 | WEIGHT: 250 LBS | HEIGHT: 68 IN

## 2025-01-06 DIAGNOSIS — M48.061 SPINAL STENOSIS OF LUMBAR REGION, UNSPECIFIED WHETHER NEUROGENIC CLAUDICATION PRESENT: Primary | ICD-10-CM

## 2025-01-06 DIAGNOSIS — M54.17 LUMBOSACRAL NEURITIS: ICD-10-CM

## 2025-01-06 PROCEDURE — 99214 OFFICE O/P EST MOD 30 MIN: CPT | Performed by: PAIN MEDICINE

## 2025-01-06 ASSESSMENT — ENCOUNTER SYMPTOMS: BACK PAIN: 1

## 2025-01-06 NOTE — PROGRESS NOTES
(Patient taking differently: As needed), Disp: 60 tablet, Rfl: 2    albuterol sulfate HFA (PROVENTIL;VENTOLIN;PROAIR) 108 (90 Base) MCG/ACT inhaler, Inhale 2 puffs into the lungs every 6 hours as needed for Wheezing, Disp: 18 g, Rfl: 3    fluconazole (DIFLUCAN) 150 MG tablet, Take 2 tablets by mouth Once a week at 5 PM, Disp: 26 tablet, Rfl: 0    fluticasone (FLONASE) 50 MCG/ACT nasal spray, 2 sprays by Each Nostril route daily, Disp: 48 g, Rfl: 1    Acetaminophen (TYLENOL ARTHRITIS PAIN PO), Take by mouth As needed, Disp: , Rfl:     ciclopirox (LOPROX) 0.77 % cream, Apply topically 2 times daily. (Patient not taking: Reported on 10/14/2024), Disp: 90 each, Rfl: 1    Family History   Problem Relation Age of Onset    Mult Sclerosis Mother     Prostate Cancer Father 71    Prostate Cancer Paternal Cousin     Mult Sclerosis Other     Mult Sclerosis Other     Mult Sclerosis Other        Social History     Socioeconomic History    Marital status: Unknown     Spouse name: Not on file    Number of children: Not on file    Years of education: Not on file    Highest education level: Not on file   Occupational History    Not on file   Tobacco Use    Smoking status: Every Day     Current packs/day: 0.50     Average packs/day: 0.5 packs/day for 39.1 years (19.5 ttl pk-yrs)     Types: Cigarettes     Start date: 12/11/1985    Smokeless tobacco: Never   Vaping Use    Vaping status: Never Used   Substance and Sexual Activity    Alcohol use: Not Currently     Comment: May have 2 drinks per year.    Drug use: No    Sexual activity: Yes     Partners: Female   Other Topics Concern    Not on file   Social History Narrative    Not on file     Social Determinants of Health     Financial Resource Strain: Low Risk  (7/1/2024)    Overall Financial Resource Strain (CARDIA)     Difficulty of Paying Living Expenses: Not hard at all   Food Insecurity: No Food Insecurity (7/1/2024)    Hunger Vital Sign     Worried About Running Out of Food in the

## 2025-01-20 ENCOUNTER — TELEPHONE (OUTPATIENT)
Dept: PAIN MANAGEMENT | Age: 55
End: 2025-01-20

## 2025-01-20 NOTE — TELEPHONE ENCOUNTER
Patient was scheduled for procedure on 02/06/25, states that at this time he has more pain in his neck and shoulders and doesn't feel back injections necessary at this time. Patient has been scheduled for OV on 01/21/25 to discuss alternative options with MD.

## 2025-01-21 ENCOUNTER — OFFICE VISIT (OUTPATIENT)
Dept: PAIN MANAGEMENT | Age: 55
End: 2025-01-21
Payer: COMMERCIAL

## 2025-01-21 VITALS — HEIGHT: 68 IN | BODY MASS INDEX: 37.89 KG/M2 | WEIGHT: 250 LBS

## 2025-01-21 DIAGNOSIS — M54.12 CERVICAL RADICULOPATHY: ICD-10-CM

## 2025-01-21 DIAGNOSIS — M47.812 CERVICAL SPONDYLOSIS: Primary | ICD-10-CM

## 2025-01-21 PROCEDURE — 99214 OFFICE O/P EST MOD 30 MIN: CPT | Performed by: PAIN MEDICINE

## 2025-01-21 RX ORDER — GABAPENTIN 100 MG/1
100 CAPSULE ORAL 3 TIMES DAILY
Qty: 90 CAPSULE | Refills: 2 | Status: SHIPPED | OUTPATIENT
Start: 2025-01-21 | End: 2025-02-20

## 2025-01-21 ASSESSMENT — ENCOUNTER SYMPTOMS: BACK PAIN: 1

## 2025-01-21 NOTE — PROGRESS NOTES
HPI:     Neck Pain   This is a chronic problem. The current episode started 1 to 4 weeks ago. The problem occurs constantly. The problem has been gradually worsening. The pain is associated with an unknown factor. The pain is present in the left side. The quality of the pain is described as cramping, shooting and stabbing. The symptoms are aggravated by position and twisting. The pain is Same all the time. Stiffness is present In the morning. He has tried ice and heat for the symptoms. The treatment provided mild relief.     I have seen him in the past for both neck and back pain.  Last visit we were addressing the low back however he comes in today and states that his symptoms have changed and he feels that his neck pain is the worst of his complaints.  MRI of the cervical spine degenerative changes and varying levels of stenosis.    Pain ranges from a 8/10 to a 10/10 depending on activity.    Patient denies any new neurological symptoms. Nobowel or bladder incontinence, no weakness, and no falling.    Review of OARRS does not show any aberrant prescription behavior.     Past Medical History:   Diagnosis Date    Arthritis     Right knee    Elevated hemoglobin (HCC) 4/27/2022    Hyperlipidemia     Hypertension        Past Surgical History:   Procedure Laterality Date    KNEE ARTHROSCOPY Right 6/19/2023    KNEE ARTHROSCOPY PARTIAL MEDIAL MENISCECTOMY and excision of osteochondral loose bodies performed by Jona Ingram MD at [facility] OR    KNEE SURGERY Right     WISDOM TOOTH EXTRACTION Bilateral        No Known Allergies      Current Outpatient Medications:     gabapentin (NEURONTIN) 100 MG capsule, Take 1 capsule by mouth 3 times daily for 30 days., Disp: 90 capsule, Rfl: 2    metoprolol succinate (TOPROL XL) 50 MG extended release tablet, TAKE 1 TABLET BY MOUTH EVERY DAY, Disp: 90 tablet, Rfl: 3    telmisartan (MICARDIS) 80 MG tablet, TAKE 1 TABLET BY MOUTH EVERY DAY, Disp: 90 tablet, Rfl: 3    aspirin 81 MG

## 2025-01-30 ENCOUNTER — OFFICE VISIT (OUTPATIENT)
Dept: PRIMARY CARE CLINIC | Age: 55
End: 2025-01-30

## 2025-01-30 VITALS
HEART RATE: 104 BPM | BODY MASS INDEX: 38.49 KG/M2 | WEIGHT: 254 LBS | HEIGHT: 68 IN | DIASTOLIC BLOOD PRESSURE: 86 MMHG | OXYGEN SATURATION: 98 % | SYSTOLIC BLOOD PRESSURE: 144 MMHG

## 2025-01-30 DIAGNOSIS — M13.0 POLYARTHRITIS: ICD-10-CM

## 2025-01-30 DIAGNOSIS — I10 ESSENTIAL (PRIMARY) HYPERTENSION: ICD-10-CM

## 2025-01-30 DIAGNOSIS — Z00.00 ANNUAL PHYSICAL EXAM: ICD-10-CM

## 2025-01-30 DIAGNOSIS — Z12.5 SCREENING PSA (PROSTATE SPECIFIC ANTIGEN): ICD-10-CM

## 2025-01-30 DIAGNOSIS — Z13.220 ENCOUNTER FOR LIPID SCREENING FOR CARDIOVASCULAR DISEASE: Primary | ICD-10-CM

## 2025-01-30 DIAGNOSIS — Z13.6 ENCOUNTER FOR LIPID SCREENING FOR CARDIOVASCULAR DISEASE: Primary | ICD-10-CM

## 2025-01-30 DIAGNOSIS — M48.061 SPINAL STENOSIS OF LUMBAR REGION, UNSPECIFIED WHETHER NEUROGENIC CLAUDICATION PRESENT: ICD-10-CM

## 2025-01-30 DIAGNOSIS — M48.02 CERVICAL SPINAL STENOSIS: ICD-10-CM

## 2025-01-30 SDOH — ECONOMIC STABILITY: FOOD INSECURITY: WITHIN THE PAST 12 MONTHS, YOU WORRIED THAT YOUR FOOD WOULD RUN OUT BEFORE YOU GOT MONEY TO BUY MORE.: NEVER TRUE

## 2025-01-30 SDOH — ECONOMIC STABILITY: FOOD INSECURITY: WITHIN THE PAST 12 MONTHS, THE FOOD YOU BOUGHT JUST DIDN'T LAST AND YOU DIDN'T HAVE MONEY TO GET MORE.: NEVER TRUE

## 2025-01-30 ASSESSMENT — ENCOUNTER SYMPTOMS
EYE REDNESS: 0
DIARRHEA: 0
EYE DISCHARGE: 0
SORE THROAT: 0
RHINORRHEA: 0
WHEEZING: 0
ABDOMINAL PAIN: 0
NAUSEA: 0
COUGH: 0
VOMITING: 0
BACK PAIN: 1
SHORTNESS OF BREATH: 0

## 2025-01-30 ASSESSMENT — PATIENT HEALTH QUESTIONNAIRE - PHQ9
SUM OF ALL RESPONSES TO PHQ9 QUESTIONS 1 & 2: 0
SUM OF ALL RESPONSES TO PHQ QUESTIONS 1-9: 0
SUM OF ALL RESPONSES TO PHQ QUESTIONS 1-9: 0
2. FEELING DOWN, DEPRESSED OR HOPELESS: NOT AT ALL
SUM OF ALL RESPONSES TO PHQ QUESTIONS 1-9: 0
SUM OF ALL RESPONSES TO PHQ QUESTIONS 1-9: 0
1. LITTLE INTEREST OR PLEASURE IN DOING THINGS: NOT AT ALL

## 2025-01-30 NOTE — PROGRESS NOTES
[facility]  [facility] PRIMARY CARE  [address]  [place] [address]  Dept: [phone]    Christiano Wade is a 54 y.o. male Established patient, who presents today for his medical conditions/complaints as noted below.      Chief Complaint   Patient presents with    spinal stenosis     Patient wants to discuss new dx.        HPI:     History of Present Illness  The patient is a 54-year-old male here for follow-up of his spinal stenosis.    He reports significant changes in his condition, prompting him to schedule appointments with his pain management specialist, Dr. Rain, and neurosurgeon this week. He has had one previous consultation with the neurosurgeon, who recommended physical therapy and a return visit. His initial symptoms included numbness and aching in his legs, primarily affecting his lower body. An MRI revealed disc issues and stenosis in his neck area. During his first consultation with the neurosurgeon, he was advised to undergo physical therapy and consult with a pain management specialist. However, he had to postpone his follow-up appointment due to not having received spinal injections, which he does not intend to pursue. He has had three consultations with his pain management specialist, who presented four treatment options: no intervention, back injections, physical therapy, or surgery. He expressed willingness to try injections but was concerned about their impact on the effectiveness of physical therapy. After a month of physical therapy, which he found beneficial, he experienced severe shoulder pain that prevented him from continuing. He was prescribed an antiseizure medication, to be taken three times daily, which has been effective in managing his pain. He continues to experience discomfort in his neck, left shoulder, and left arm. He has not yet tried any injections. He has an upcoming appointment with his neurosurgeon on 02/05/2025 and with his pain

## 2025-02-05 ENCOUNTER — OFFICE VISIT (OUTPATIENT)
Dept: NEUROSURGERY | Age: 55
End: 2025-02-05
Payer: COMMERCIAL

## 2025-02-05 VITALS
DIASTOLIC BLOOD PRESSURE: 92 MMHG | HEART RATE: 95 BPM | SYSTOLIC BLOOD PRESSURE: 142 MMHG | HEIGHT: 68 IN | WEIGHT: 257 LBS | BODY MASS INDEX: 38.95 KG/M2

## 2025-02-05 DIAGNOSIS — M48.062 LUMBAR STENOSIS WITH NEUROGENIC CLAUDICATION: ICD-10-CM

## 2025-02-05 DIAGNOSIS — G99.2 STENOSIS OF CERVICAL SPINE WITH MYELOPATHY (HCC): ICD-10-CM

## 2025-02-05 DIAGNOSIS — G83.4 CAUDA EQUINA SYNDROME (HCC): Primary | ICD-10-CM

## 2025-02-05 DIAGNOSIS — M48.02 STENOSIS OF CERVICAL SPINE WITH MYELOPATHY (HCC): ICD-10-CM

## 2025-02-05 PROCEDURE — 3077F SYST BP >= 140 MM HG: CPT | Performed by: NEUROLOGICAL SURGERY

## 2025-02-05 PROCEDURE — 3080F DIAST BP >= 90 MM HG: CPT | Performed by: NEUROLOGICAL SURGERY

## 2025-02-05 PROCEDURE — 99215 OFFICE O/P EST HI 40 MIN: CPT | Performed by: NEUROLOGICAL SURGERY

## 2025-02-05 NOTE — PROGRESS NOTES
any improvement in the lower extremity symptoms with the Neurontin.    Patient also with significant mount of axial neck pain with radiation to bilateral upper extremities involving left upper extremity into the shoulder and aching pain.  Also with significant discomfort and numbness of bilateral hands left greater than right along with issues related to dexterity and fine motor control of the upper extremities.  No major issues with ataxia.  Has been to extensive physical therapy regimen for both the arms and legs.      History:     Past Medical History:   Diagnosis Date    Arthritis     Right knee    Elevated hemoglobin (HCC) 4/27/2022    Hyperlipidemia     Hypertension      Past Surgical History:   Procedure Laterality Date    KNEE ARTHROSCOPY Right 6/19/2023    KNEE ARTHROSCOPY PARTIAL MEDIAL MENISCECTOMY and excision of osteochondral loose bodies performed by Jona Ingram MD at [facility] OR    KNEE SURGERY Right     WISDOM TOOTH EXTRACTION Bilateral      Family History   Problem Relation Age of Onset    Mult Sclerosis Mother     Prostate Cancer Father 71    Prostate Cancer Paternal Cousin     Mult Sclerosis Other     Mult Sclerosis Other     Mult Sclerosis Other      Current Outpatient Medications on File Prior to Visit   Medication Sig Dispense Refill    gabapentin (NEURONTIN) 100 MG capsule Take 1 capsule by mouth 3 times daily for 30 days. 90 capsule 2    metoprolol succinate (TOPROL XL) 50 MG extended release tablet TAKE 1 TABLET BY MOUTH EVERY DAY 90 tablet 3    telmisartan (MICARDIS) 80 MG tablet TAKE 1 TABLET BY MOUTH EVERY DAY 90 tablet 3    aspirin 81 MG chewable tablet Take 1 tablet by mouth daily      albuterol sulfate HFA (PROVENTIL;VENTOLIN;PROAIR) 108 (90 Base) MCG/ACT inhaler Inhale 2 puffs into the lungs every 6 hours as needed for Wheezing 18 g 3    fluticasone (FLONASE) 50 MCG/ACT nasal spray 2 sprays by Each Nostril route daily (Patient taking differently: 2 sprays by Each Nostril route daily

## 2025-02-06 ENCOUNTER — CLINICAL DOCUMENTATION (OUTPATIENT)
Dept: PHYSICAL THERAPY | Facility: CLINIC | Age: 55
End: 2025-02-06

## 2025-02-06 NOTE — THERAPY DISCHARGE
[] [facility]  Outpatient Rehabilitation &  Therapy  [address]  [phone]  [phone] [] [facility]  Outpatient Rehabilitation &  Therapy  [address]  P: [phone]  F: [phone] [x] [facility]  Outpatient Rehabilitation &  Therapy  [address]  P: [phone]  F: [phone] [] [facility]  Outpatient Rehabilitation &  Therapy  [address]  [phone]  [phone] [] [facility]  Outpatient Rehabilitation &  Therapy  [address]   P: [phone]  F: [phone]  [] [facility]  Outpatient Rehabilitation &  Therapy  [address]  P: [phone]  F: [phone] [] [facility]  Outpatient Rehabilitation &  Therapy  [address]  P: [phone]  F: [phone] [] [facility]  Outpatient Rehabilitation &  Therapy  [address]  P: [phone]  F: [phone] [] [facility]  Outpatient Rehabilitation &  Therapy  [address]  P: [phone]  F: [phone]  [] [facility] Outpatient Rehabilitation &  Therapy  [address]  P: [phone]  F: [phone]     Physical Therapy Discharge Note    Date: 2025      Patient: Christiano Wade  : 1970  MRN: [id number]    Physician: Monae Cowan APRN - MIRIAM                           Insurance: [employer] OA, 60 visit hard max, 60 visits remaining, no auth required. CPT codes verified: 23708, 02147, 76985, 86247, 91070, 97878 Excluded: VASO 81365; DRY NEEDLING ,   Medical Diagnosis: M48.062 (ICD-10-CM) - Lumbar stenosis with neurogenic claudication, G95.9 (ICD-10-CM) - Myelopathy (HCC)                     Rehab Codes: M54.5, M54.50   Onset Date: May 23, 2022            Total visits attended:14  Cancels/No shows:1  Date of initial

## 2025-03-06 ENCOUNTER — HOSPITAL ENCOUNTER (OUTPATIENT)
Dept: MRI IMAGING | Age: 55
Discharge: HOME | End: 2025-03-08
Attending: NEUROLOGICAL SURGERY
Payer: COMMERCIAL

## 2025-03-06 DIAGNOSIS — M48.02 STENOSIS OF CERVICAL SPINE WITH MYELOPATHY (HCC): ICD-10-CM

## 2025-03-06 DIAGNOSIS — G99.2 STENOSIS OF CERVICAL SPINE WITH MYELOPATHY (HCC): ICD-10-CM

## 2025-03-06 PROCEDURE — 72141 MRI NECK SPINE W/O DYE: CPT

## 2025-03-26 ENCOUNTER — OFFICE VISIT (OUTPATIENT)
Dept: NEUROSURGERY | Age: 55
End: 2025-03-26
Payer: COMMERCIAL

## 2025-03-26 VITALS
DIASTOLIC BLOOD PRESSURE: 92 MMHG | WEIGHT: 257 LBS | HEIGHT: 68 IN | OXYGEN SATURATION: 97 % | BODY MASS INDEX: 38.95 KG/M2 | SYSTOLIC BLOOD PRESSURE: 124 MMHG | HEART RATE: 121 BPM

## 2025-03-26 DIAGNOSIS — M48.062 LUMBAR STENOSIS WITH NEUROGENIC CLAUDICATION: ICD-10-CM

## 2025-03-26 DIAGNOSIS — M48.02 STENOSIS OF CERVICAL SPINE WITH MYELOPATHY (HCC): ICD-10-CM

## 2025-03-26 DIAGNOSIS — G83.4 CAUDA EQUINA SYNDROME (HCC): Primary | ICD-10-CM

## 2025-03-26 DIAGNOSIS — G99.2 STENOSIS OF CERVICAL SPINE WITH MYELOPATHY (HCC): ICD-10-CM

## 2025-03-26 PROCEDURE — 99215 OFFICE O/P EST HI 40 MIN: CPT | Performed by: NEUROLOGICAL SURGERY

## 2025-03-26 PROCEDURE — 3074F SYST BP LT 130 MM HG: CPT | Performed by: NEUROLOGICAL SURGERY

## 2025-03-26 PROCEDURE — 3080F DIAST BP >= 90 MM HG: CPT | Performed by: NEUROLOGICAL SURGERY

## 2025-03-26 NOTE — PROGRESS NOTES
[facility] NEUROSURGERY [facility]  [address]  [address] - GROUND FLOOR, [address]  [facility] [address]  Dept: [phone]    Patient:  Christiano Wade  YOB: 1970  Date: 3/26/25    The patient is a 54 y.o. male who presents today for consult of the following problems:     Chief Complaint   Patient presents with    Cauda equina syndrome     6-8 week follow up with MRI              HPI:     Christiano Wade is a 54 y.o. male on whom neurosurgical consultation was requested by Vic Clemons MD for management of cauda equina syndrome as well as cervical stenosis and myelopathy.  Patient has had a myriad of symptoms that have been progressive even since her last visit.  Specifically relays difficulty with grasp buttoning writing tying shoes ADLs and general upper extremity abnormalities in terms of dexterity upper extremity sensation.  He has also had a decline in overall strength.  Has had difficulty with gait ataxia trouble with lower extremity numbness which has been progressive.  Does have intermittent saddle anesthesia as well in the groin and genital region which has been progressive but present for a year.  In addition he has having urinary retention difficulty with constipation that are progressive and new issues.  Not using assistive device as of right now..      History:     Past Medical History:   Diagnosis Date    Arthritis     Right knee    Elevated hemoglobin 4/27/2022    Hyperlipidemia     Hypertension      Past Surgical History:   Procedure Laterality Date    KNEE ARTHROSCOPY Right 6/19/2023    KNEE ARTHROSCOPY PARTIAL MEDIAL MENISCECTOMY and excision of osteochondral loose bodies performed by Jona Ingram MD at [facility] OR    KNEE SURGERY Right     WISDOM TOOTH EXTRACTION Bilateral      Family History   Problem Relation Age of Onset    Mult Sclerosis Mother     Prostate Cancer Father 71    Prostate Cancer Paternal

## 2025-04-17 RX ORDER — SODIUM CHLORIDE, SODIUM LACTATE, POTASSIUM CHLORIDE, CALCIUM CHLORIDE 600; 310; 30; 20 MG/100ML; MG/100ML; MG/100ML; MG/100ML
INJECTION, SOLUTION INTRAVENOUS CONTINUOUS
OUTPATIENT
Start: 2025-04-17

## 2025-04-17 NOTE — DISCHARGE INSTRUCTIONS
Preoperative Instructions:    Stop eating solid foods at midnight the night prior to surgery.    Stop drinking clear liquids at midnight the night prior to surgery.    Arrive at the surgery center (Entrance B) by 5:45-6:00 on 5/9/2025  (or as directed by your surgeon's office).    If you have been given a blood band, you must bring it with you the day of surgery.    Please stop any blood thinning medications as directed by your surgeon or prescribing physician. Failure to stop certain medications may interfere with your scheduled surgery.    These may include:  Aspirin, Warfarin (Coumadin), Clopidogrel (Plavix), Ibuprofen (Motrin, Advil), Naproxen (Aleve), Meloxicam (Mobic), Celecoxib (Celebrex), Eliquis, Pradaxa, Xarelto, Effient, Fish Oil, Herbal supplements.  Aspirin  Telmisartan/micardis - 24 hours    You may continue the rest of your medications through the night before surgery unless instructed otherwise.    Please take only the following medication(s) the day of surgery with a small sip of water:  Toprol, gabapentin/neurontin    Please use and bring inhalers the day of surgery, if applies.  Please bring CPAP the day of surgery, if applies.    PLEASE NOTE:  THE ABOVE (IF ANY) DISCONTINUED MEDS MAY ONLY BE FROM   \"CLEANING UP\" THE MED LIST AND WERE NOT ACTUALLY CANCELLED; SEE CHART FOR DETAILS AND ALWAYS CHECK WITH PRESCRIBING PROVIDER BEFORE DISCONTINUING ANY MEDICATIONS        REMINDERS:  ** If you are going home the day of your procedure, you will need a friend or family member to drive you home after your procedure.  Your  must be 18 years of age or older and able to sign off on your discharge instructions.  Taxi cabs or any form of public transportation is not acceptable.    ** It is preferable that the friend or family member stay at the hospital throughout your procedure.  ** If you are going home the same day as your procedure, someone must remain with you for the first 24 hours after your surgery  body thoroughly.    Pat yourself dry with a clean, soft towel. Do not apply lotion, cream or powder.    Dress with clean freshly washed clothes.    The morning of surgery:    Repeat shower following steps above  - using remaining half of CHG soap in bottle.     If you have any questions, call the Pre-Admission Testing Unit at [phone].     Day of Surgery/Procedure    As a patient at [facility] you can expect quality medical and nursing care that is centered on your individual needs.  Our goal is to make your surgical experience as comfortable as possible  .  Directions to the Surgery Center    [facility] is located at [address] [place] [address].  Please pull into the Emergency Room & Surgery Center parking lot (Entrance B) and park in that lot.  We also have additional parking across the street.  You will enter the facility following the [facility] sign.  Please stop at the reception desk where you will be checked in by the staff.  If you have any questions please call [phone].    Transportation after your procedure.    You will need a friend or family member to drive you home after your procedure.  Your  must be 18 years of age or older and able to sign off on your discharge instructions.  Taxi cabs or any form of public transportation is not acceptable.  It is preferable that the friend or family member stay at the hospital throughout your procedure.  Someone must remain at home with you for the first 24 hours after your surgery if you receive anesthesia or sedation.  If you do not have someone to stay with you, your procedure may be cancelled.    Patient Instructions    If you are having any type of anesthesia you are to have nothing to eat or drink after midnight the night before your surgery.  This includes gum, mints, water or smoking or chewing tobacco.  The only exception to this is a small sip of water to take with any morning dose

## 2025-04-18 ENCOUNTER — TELEPHONE (OUTPATIENT)
Dept: NEUROSURGERY | Age: 55
End: 2025-04-18

## 2025-04-18 NOTE — TELEPHONE ENCOUNTER
Per Catherine, patient needed to be changed to inpatient due to CPT code.     Called and spoke with Graham to make the change.

## 2025-04-21 ENCOUNTER — PATIENT MESSAGE (OUTPATIENT)
Dept: PRIMARY CARE CLINIC | Age: 55
End: 2025-04-21

## 2025-04-22 NOTE — TELEPHONE ENCOUNTER
-Called patient with no answer and VM box is full, 2 failed attempts to contact patient. Letter to be mailed.

## 2025-04-23 DIAGNOSIS — M47.812 CERVICAL SPONDYLOSIS: ICD-10-CM

## 2025-04-23 DIAGNOSIS — M54.12 CERVICAL RADICULOPATHY: ICD-10-CM

## 2025-04-23 RX ORDER — GABAPENTIN 100 MG/1
100 CAPSULE ORAL 3 TIMES DAILY
Qty: 90 CAPSULE | Refills: 0 | OUTPATIENT
Start: 2025-04-23 | End: 2025-05-23

## 2025-04-25 ENCOUNTER — TELEPHONE (OUTPATIENT)
Dept: NEUROSURGERY | Age: 55
End: 2025-04-25

## 2025-04-25 ENCOUNTER — HOSPITAL ENCOUNTER (OUTPATIENT)
Dept: PREADMISSION TESTING | Age: 55
Discharge: HOME | End: 2025-04-29
Payer: COMMERCIAL

## 2025-04-25 VITALS
WEIGHT: 252 LBS | DIASTOLIC BLOOD PRESSURE: 64 MMHG | RESPIRATION RATE: 20 BRPM | TEMPERATURE: 98.1 F | BODY MASS INDEX: 38.19 KG/M2 | SYSTOLIC BLOOD PRESSURE: 114 MMHG | HEIGHT: 68 IN | OXYGEN SATURATION: 96 % | HEART RATE: 105 BPM

## 2025-04-25 LAB
ABO + RH BLD: NORMAL
ANION GAP SERPL CALCULATED.3IONS-SCNC: 13 MMOL/L (ref 9–16)
ARM BAND NUMBER: NORMAL
BACTERIA URNS QL MICRO: NORMAL
BILIRUB UR QL STRIP: NEGATIVE
BLOOD BANK SAMPLE EXPIRATION: NORMAL
BLOOD GROUP ANTIBODIES SERPL: NEGATIVE
BUN SERPL-MCNC: 15 MG/DL (ref 6–20)
CALCIUM SERPL-MCNC: 9.4 MG/DL (ref 8.6–10.4)
CASTS #/AREA URNS LPF: NORMAL /LPF (ref 0–8)
CHLORIDE SERPL-SCNC: 102 MMOL/L (ref 98–107)
CLARITY UR: CLEAR
CO2 SERPL-SCNC: 20 MMOL/L (ref 20–31)
COLOR UR: ABNORMAL
CREAT SERPL-MCNC: 1.2 MG/DL (ref 0.7–1.2)
EPI CELLS #/AREA URNS HPF: NORMAL /HPF (ref 0–5)
ERYTHROCYTE [DISTWIDTH] IN BLOOD BY AUTOMATED COUNT: 11.7 % (ref 11.8–14.4)
GFR, ESTIMATED: 72 ML/MIN/1.73M2
GLUCOSE SERPL-MCNC: 112 MG/DL (ref 74–99)
GLUCOSE UR STRIP-MCNC: NEGATIVE MG/DL
HCT VFR BLD AUTO: 45.3 % (ref 40.7–50.3)
HGB BLD-MCNC: 15.4 G/DL (ref 13–17)
HGB UR QL STRIP.AUTO: NEGATIVE
KETONES UR STRIP-MCNC: ABNORMAL MG/DL
LEUKOCYTE ESTERASE UR QL STRIP: NEGATIVE
MCH RBC QN AUTO: 31.5 PG (ref 25.2–33.5)
MCHC RBC AUTO-ENTMCNC: 34 G/DL (ref 28.4–34.8)
MCV RBC AUTO: 92.6 FL (ref 82.6–102.9)
NITRITE UR QL STRIP: NEGATIVE
NRBC BLD-RTO: 0 PER 100 WBC
PH UR STRIP: 5.5 [PH] (ref 5–8)
PLATELET # BLD AUTO: 297 K/UL (ref 138–453)
PMV BLD AUTO: 9.9 FL (ref 8.1–13.5)
POTASSIUM SERPL-SCNC: 4.5 MMOL/L (ref 3.7–5.3)
PROT UR STRIP-MCNC: ABNORMAL MG/DL
RBC # BLD AUTO: 4.89 M/UL (ref 4.21–5.77)
RBC #/AREA URNS HPF: NORMAL /HPF (ref 0–4)
SODIUM SERPL-SCNC: 135 MMOL/L (ref 136–145)
SP GR UR STRIP: 1.03 (ref 1–1.03)
UROBILINOGEN UR STRIP-ACNC: NORMAL EU/DL (ref 0–1)
WBC #/AREA URNS HPF: NORMAL /HPF (ref 0–5)
WBC OTHER # BLD: 9.8 K/UL (ref 3.5–11.3)

## 2025-04-25 PROCEDURE — 93005 ELECTROCARDIOGRAM TRACING: CPT | Performed by: STUDENT IN AN ORGANIZED HEALTH CARE EDUCATION/TRAINING PROGRAM

## 2025-04-25 PROCEDURE — 85027 COMPLETE CBC AUTOMATED: CPT

## 2025-04-25 PROCEDURE — 86900 BLOOD TYPING SEROLOGIC ABO: CPT

## 2025-04-25 PROCEDURE — 86850 RBC ANTIBODY SCREEN: CPT

## 2025-04-25 PROCEDURE — 86901 BLOOD TYPING SEROLOGIC RH(D): CPT

## 2025-04-25 PROCEDURE — 80048 BASIC METABOLIC PNL TOTAL CA: CPT

## 2025-04-25 PROCEDURE — 36415 COLL VENOUS BLD VENIPUNCTURE: CPT

## 2025-04-25 PROCEDURE — 81001 URINALYSIS AUTO W/SCOPE: CPT

## 2025-04-27 LAB
EKG ATRIAL RATE: 100 BPM
EKG P AXIS: 27 DEGREES
EKG P-R INTERVAL: 146 MS
EKG Q-T INTERVAL: 370 MS
EKG QRS DURATION: 136 MS
EKG QTC CALCULATION (BAZETT): 477 MS
EKG R AXIS: -27 DEGREES
EKG T AXIS: 28 DEGREES
EKG VENTRICULAR RATE: 100 BPM

## 2025-04-27 PROCEDURE — 93010 ELECTROCARDIOGRAM REPORT: CPT | Performed by: INTERNAL MEDICINE

## 2025-04-30 DIAGNOSIS — M47.812 CERVICAL SPONDYLOSIS: ICD-10-CM

## 2025-04-30 DIAGNOSIS — M54.12 CERVICAL RADICULOPATHY: ICD-10-CM

## 2025-04-30 RX ORDER — GABAPENTIN 100 MG/1
100 CAPSULE ORAL 3 TIMES DAILY
Qty: 90 CAPSULE | Refills: 2 | Status: SHIPPED | OUTPATIENT
Start: 2025-04-30 | End: 2025-05-30

## 2025-05-09 ENCOUNTER — ANESTHESIA EVENT (OUTPATIENT)
Dept: OPERATING ROOM | Age: 55
End: 2025-05-09
Payer: COMMERCIAL

## 2025-05-09 ENCOUNTER — ANESTHESIA (OUTPATIENT)
Dept: OPERATING ROOM | Age: 55
End: 2025-05-09
Payer: COMMERCIAL

## 2025-05-09 ENCOUNTER — APPOINTMENT (OUTPATIENT)
Dept: GENERAL RADIOLOGY | Age: 55
DRG: 029 | End: 2025-05-09
Attending: NEUROLOGICAL SURGERY
Payer: COMMERCIAL

## 2025-05-09 ENCOUNTER — HOSPITAL ENCOUNTER (INPATIENT)
Age: 55
LOS: 3 days | Discharge: HOME | DRG: 029 | End: 2025-05-12
Attending: NEUROLOGICAL SURGERY | Admitting: NEUROLOGICAL SURGERY
Payer: COMMERCIAL

## 2025-05-09 DIAGNOSIS — G89.18 ACUTE POST-OPERATIVE PAIN: Primary | ICD-10-CM

## 2025-05-09 PROBLEM — G99.2 STENOSIS OF CERVICAL SPINE WITH MYELOPATHY (HCC): Status: ACTIVE | Noted: 2025-05-09

## 2025-05-09 PROBLEM — M48.02 STENOSIS OF CERVICAL SPINE WITH MYELOPATHY (HCC): Status: ACTIVE | Noted: 2025-05-09

## 2025-05-09 LAB — GLUCOSE BLD-MCNC: 173 MG/DL (ref 75–110)

## 2025-05-09 PROCEDURE — L0120 CERV FLEX N/ADJ FOAM PRE OTS: HCPCS | Performed by: NEUROLOGICAL SURGERY

## 2025-05-09 PROCEDURE — 01NB0ZZ RELEASE LUMBAR NERVE, OPEN APPROACH: ICD-10-PCS | Performed by: NEUROLOGICAL SURGERY

## 2025-05-09 PROCEDURE — 7100000000 HC PACU RECOVERY - FIRST 15 MIN: Performed by: NEUROLOGICAL SURGERY

## 2025-05-09 PROCEDURE — 2500000003 HC RX 250 WO HCPCS: Performed by: NEUROLOGICAL SURGERY

## 2025-05-09 PROCEDURE — 63047 LAM FACETEC & FORAMOT LUMBAR: CPT | Performed by: NEUROLOGICAL SURGERY

## 2025-05-09 PROCEDURE — 2580000003 HC RX 258: Performed by: NURSE ANESTHETIST, CERTIFIED REGISTERED

## 2025-05-09 PROCEDURE — 2500000003 HC RX 250 WO HCPCS: Performed by: NURSE PRACTITIONER

## 2025-05-09 PROCEDURE — 0PB30ZZ EXCISION OF CERVICAL VERTEBRA, OPEN APPROACH: ICD-10-PCS | Performed by: NEUROLOGICAL SURGERY

## 2025-05-09 PROCEDURE — 2060000000 HC ICU INTERMEDIATE R&B

## 2025-05-09 PROCEDURE — 22845 INSERT SPINE FIXATION DEVICE: CPT | Performed by: NEUROLOGICAL SURGERY

## 2025-05-09 PROCEDURE — 6360000002 HC RX W HCPCS: Performed by: NURSE ANESTHETIST, CERTIFIED REGISTERED

## 2025-05-09 PROCEDURE — 6370000000 HC RX 637 (ALT 250 FOR IP): Performed by: NURSE PRACTITIONER

## 2025-05-09 PROCEDURE — 3600000015 HC SURGERY LEVEL 5 ADDTL 15MIN: Performed by: NEUROLOGICAL SURGERY

## 2025-05-09 PROCEDURE — 2580000003 HC RX 258: Performed by: NURSE PRACTITIONER

## 2025-05-09 PROCEDURE — 22853 INSJ BIOMECHANICAL DEVICE: CPT | Performed by: NEUROLOGICAL SURGERY

## 2025-05-09 PROCEDURE — 6360000002 HC RX W HCPCS: Performed by: NEUROLOGICAL SURGERY

## 2025-05-09 PROCEDURE — 2709999900 HC NON-CHARGEABLE SUPPLY: Performed by: NEUROLOGICAL SURGERY

## 2025-05-09 PROCEDURE — C1889 IMPLANT/INSERT DEVICE, NOC: HCPCS | Performed by: NEUROLOGICAL SURGERY

## 2025-05-09 PROCEDURE — 3600000005 HC SURGERY LEVEL 5 BASE: Performed by: NEUROLOGICAL SURGERY

## 2025-05-09 PROCEDURE — 7100000001 HC PACU RECOVERY - ADDTL 15 MIN: Performed by: NEUROLOGICAL SURGERY

## 2025-05-09 PROCEDURE — 2500000003 HC RX 250 WO HCPCS: Performed by: NURSE ANESTHETIST, CERTIFIED REGISTERED

## 2025-05-09 PROCEDURE — C1713 ANCHOR/SCREW BN/BN,TIS/BN: HCPCS | Performed by: NEUROLOGICAL SURGERY

## 2025-05-09 PROCEDURE — 82947 ASSAY GLUCOSE BLOOD QUANT: CPT

## 2025-05-09 PROCEDURE — 0MBD0ZZ EXCISION OF LOWER SPINE BURSA AND LIGAMENT, OPEN APPROACH: ICD-10-PCS | Performed by: NEUROLOGICAL SURGERY

## 2025-05-09 PROCEDURE — 22552 ARTHRD ANT NTRBD CERVICAL EA: CPT | Performed by: NEUROLOGICAL SURGERY

## 2025-05-09 PROCEDURE — 3700000001 HC ADD 15 MINUTES (ANESTHESIA): Performed by: NEUROLOGICAL SURGERY

## 2025-05-09 PROCEDURE — 3700000000 HC ANESTHESIA ATTENDED CARE: Performed by: NEUROLOGICAL SURGERY

## 2025-05-09 PROCEDURE — 6370000000 HC RX 637 (ALT 250 FOR IP): Performed by: NEUROLOGICAL SURGERY

## 2025-05-09 PROCEDURE — 00NW0ZZ RELEASE CERVICAL SPINAL CORD, OPEN APPROACH: ICD-10-PCS | Performed by: NEUROLOGICAL SURGERY

## 2025-05-09 PROCEDURE — 00QT0ZZ REPAIR SPINAL MENINGES, OPEN APPROACH: ICD-10-PCS | Performed by: NEUROLOGICAL SURGERY

## 2025-05-09 PROCEDURE — 6360000002 HC RX W HCPCS: Performed by: NURSE PRACTITIONER

## 2025-05-09 PROCEDURE — 0RB30ZZ EXCISION OF CERVICAL VERTEBRAL DISC, OPEN APPROACH: ICD-10-PCS | Performed by: NEUROLOGICAL SURGERY

## 2025-05-09 PROCEDURE — 2720000010 HC SURG SUPPLY STERILE: Performed by: NEUROLOGICAL SURGERY

## 2025-05-09 PROCEDURE — 2580000003 HC RX 258: Performed by: STUDENT IN AN ORGANIZED HEALTH CARE EDUCATION/TRAINING PROGRAM

## 2025-05-09 PROCEDURE — 0RG20A0 FUSION OF 2 OR MORE CERVICAL VERTEBRAL JOINTS WITH INTERBODY FUSION DEVICE, ANTERIOR APPROACH, ANTERIOR COLUMN, OPEN APPROACH: ICD-10-PCS | Performed by: NEUROLOGICAL SURGERY

## 2025-05-09 PROCEDURE — 22551 ARTHRD ANT NTRBDY CERVICAL: CPT | Performed by: NEUROLOGICAL SURGERY

## 2025-05-09 DEVICE — EIT CIF CAGE, H 7MM, 8°, L
Type: IMPLANTABLE DEVICE | Site: SPINE CERVICAL | Status: FUNCTIONAL
Brand: EIT CIF CAGE

## 2025-05-09 DEVICE — EIT CIF CAGE, H 8MM, 8°, L
Type: IMPLANTABLE DEVICE | Site: SPINE CERVICAL | Status: FUNCTIONAL
Brand: EIT CIF CAGE

## 2025-05-09 DEVICE — IMPLANTABLE DEVICE: Type: IMPLANTABLE DEVICE | Site: SPINE CERVICAL | Status: FUNCTIONAL

## 2025-05-09 DEVICE — GRAFT BNE SUB SM 1ML CRYOPRESERVED VIABLE CORT CANC BNE: Type: IMPLANTABLE DEVICE | Site: SPINE CERVICAL | Status: FUNCTIONAL

## 2025-05-09 DEVICE — SCREW SPNL L 16 MM DIA 3.5 MM TI ANTR CERV SELF DRILLING VA: Type: IMPLANTABLE DEVICE | Site: SPINE CERVICAL | Status: FUNCTIONAL

## 2025-05-09 RX ORDER — SODIUM CHLORIDE 9 MG/ML
INJECTION, SOLUTION INTRAVENOUS PRN
Status: DISCONTINUED | OUTPATIENT
Start: 2025-05-09 | End: 2025-05-12 | Stop reason: HOSPADM

## 2025-05-09 RX ORDER — LIDOCAINE HYDROCHLORIDE AND EPINEPHRINE 10; 10 MG/ML; UG/ML
INJECTION, SOLUTION INFILTRATION; PERINEURAL PRN
Status: DISCONTINUED | OUTPATIENT
Start: 2025-05-09 | End: 2025-05-09 | Stop reason: HOSPADM

## 2025-05-09 RX ORDER — DROPERIDOL 2.5 MG/ML
0.62 INJECTION, SOLUTION INTRAMUSCULAR; INTRAVENOUS
Status: DISCONTINUED | OUTPATIENT
Start: 2025-05-09 | End: 2025-05-09 | Stop reason: HOSPADM

## 2025-05-09 RX ORDER — SODIUM CHLORIDE, SODIUM LACTATE, POTASSIUM CHLORIDE, CALCIUM CHLORIDE 600; 310; 30; 20 MG/100ML; MG/100ML; MG/100ML; MG/100ML
INJECTION, SOLUTION INTRAVENOUS CONTINUOUS
Status: DISCONTINUED | OUTPATIENT
Start: 2025-05-09 | End: 2025-05-09 | Stop reason: HOSPADM

## 2025-05-09 RX ORDER — ENOXAPARIN SODIUM 100 MG/ML
30 INJECTION SUBCUTANEOUS 2 TIMES DAILY
Status: DISCONTINUED | OUTPATIENT
Start: 2025-05-10 | End: 2025-05-12 | Stop reason: HOSPADM

## 2025-05-09 RX ORDER — SODIUM CHLORIDE 9 MG/ML
INJECTION, SOLUTION INTRAVENOUS CONTINUOUS
Status: DISCONTINUED | OUTPATIENT
Start: 2025-05-09 | End: 2025-05-12 | Stop reason: HOSPADM

## 2025-05-09 RX ORDER — ONDANSETRON 4 MG/1
4 TABLET, ORALLY DISINTEGRATING ORAL EVERY 8 HOURS PRN
Status: DISCONTINUED | OUTPATIENT
Start: 2025-05-09 | End: 2025-05-12 | Stop reason: HOSPADM

## 2025-05-09 RX ORDER — DEXAMETHASONE SODIUM PHOSPHATE 10 MG/ML
INJECTION, SOLUTION INTRA-ARTICULAR; INTRALESIONAL; INTRAMUSCULAR; INTRAVENOUS; SOFT TISSUE
Status: DISCONTINUED | OUTPATIENT
Start: 2025-05-09 | End: 2025-05-09 | Stop reason: SDUPTHER

## 2025-05-09 RX ORDER — SODIUM CHLORIDE 0.9 % (FLUSH) 0.9 %
5-40 SYRINGE (ML) INJECTION EVERY 12 HOURS SCHEDULED
Status: DISCONTINUED | OUTPATIENT
Start: 2025-05-09 | End: 2025-05-09 | Stop reason: HOSPADM

## 2025-05-09 RX ORDER — SODIUM CHLORIDE 0.9 % (FLUSH) 0.9 %
5-40 SYRINGE (ML) INJECTION EVERY 12 HOURS SCHEDULED
Status: DISCONTINUED | OUTPATIENT
Start: 2025-05-09 | End: 2025-05-12 | Stop reason: HOSPADM

## 2025-05-09 RX ORDER — LOSARTAN POTASSIUM 50 MG/1
100 TABLET ORAL DAILY
Status: DISCONTINUED | OUTPATIENT
Start: 2025-05-09 | End: 2025-05-12 | Stop reason: HOSPADM

## 2025-05-09 RX ORDER — LIDOCAINE HYDROCHLORIDE 10 MG/ML
INJECTION, SOLUTION EPIDURAL; INFILTRATION; INTRACAUDAL; PERINEURAL
Status: DISCONTINUED | OUTPATIENT
Start: 2025-05-09 | End: 2025-05-09 | Stop reason: SDUPTHER

## 2025-05-09 RX ORDER — CYCLOBENZAPRINE HCL 10 MG
10 TABLET ORAL 3 TIMES DAILY PRN
Status: DISCONTINUED | OUTPATIENT
Start: 2025-05-09 | End: 2025-05-12 | Stop reason: HOSPADM

## 2025-05-09 RX ORDER — GABAPENTIN 100 MG/1
100 CAPSULE ORAL 3 TIMES DAILY
Status: DISCONTINUED | OUTPATIENT
Start: 2025-05-09 | End: 2025-05-12 | Stop reason: HOSPADM

## 2025-05-09 RX ORDER — MIDAZOLAM HYDROCHLORIDE 1 MG/ML
INJECTION, SOLUTION INTRAMUSCULAR; INTRAVENOUS
Status: DISCONTINUED | OUTPATIENT
Start: 2025-05-09 | End: 2025-05-09 | Stop reason: SDUPTHER

## 2025-05-09 RX ORDER — BISACODYL 10 MG
10 SUPPOSITORY, RECTAL RECTAL DAILY PRN
Status: DISCONTINUED | OUTPATIENT
Start: 2025-05-09 | End: 2025-05-12 | Stop reason: HOSPADM

## 2025-05-09 RX ORDER — SODIUM CHLORIDE 9 MG/ML
INJECTION, SOLUTION INTRAVENOUS PRN
Status: DISCONTINUED | OUTPATIENT
Start: 2025-05-09 | End: 2025-05-09 | Stop reason: HOSPADM

## 2025-05-09 RX ORDER — NALOXONE HYDROCHLORIDE 0.4 MG/ML
INJECTION, SOLUTION INTRAMUSCULAR; INTRAVENOUS; SUBCUTANEOUS PRN
Status: DISCONTINUED | OUTPATIENT
Start: 2025-05-09 | End: 2025-05-09 | Stop reason: HOSPADM

## 2025-05-09 RX ORDER — SODIUM CHLORIDE 0.9 % (FLUSH) 0.9 %
5-40 SYRINGE (ML) INJECTION PRN
Status: DISCONTINUED | OUTPATIENT
Start: 2025-05-09 | End: 2025-05-12 | Stop reason: HOSPADM

## 2025-05-09 RX ORDER — PROPOFOL 10 MG/ML
INJECTION, EMULSION INTRAVENOUS
Status: DISCONTINUED | OUTPATIENT
Start: 2025-05-09 | End: 2025-05-09 | Stop reason: SDUPTHER

## 2025-05-09 RX ORDER — SENNA AND DOCUSATE SODIUM 50; 8.6 MG/1; MG/1
1 TABLET, FILM COATED ORAL 2 TIMES DAILY
Status: DISCONTINUED | OUTPATIENT
Start: 2025-05-09 | End: 2025-05-12 | Stop reason: HOSPADM

## 2025-05-09 RX ORDER — FENTANYL CITRATE 50 UG/ML
INJECTION, SOLUTION INTRAMUSCULAR; INTRAVENOUS
Status: DISCONTINUED | OUTPATIENT
Start: 2025-05-09 | End: 2025-05-09 | Stop reason: SDUPTHER

## 2025-05-09 RX ORDER — METOPROLOL SUCCINATE 25 MG/1
50 TABLET, EXTENDED RELEASE ORAL DAILY
Status: DISCONTINUED | OUTPATIENT
Start: 2025-05-09 | End: 2025-05-12 | Stop reason: HOSPADM

## 2025-05-09 RX ORDER — MEPERIDINE HYDROCHLORIDE 50 MG/ML
12.5 INJECTION INTRAMUSCULAR; INTRAVENOUS; SUBCUTANEOUS EVERY 5 MIN PRN
Status: DISCONTINUED | OUTPATIENT
Start: 2025-05-09 | End: 2025-05-09 | Stop reason: HOSPADM

## 2025-05-09 RX ORDER — ONDANSETRON 2 MG/ML
4 INJECTION INTRAMUSCULAR; INTRAVENOUS EVERY 6 HOURS PRN
Status: DISCONTINUED | OUTPATIENT
Start: 2025-05-09 | End: 2025-05-12 | Stop reason: HOSPADM

## 2025-05-09 RX ORDER — ONDANSETRON 2 MG/ML
INJECTION INTRAMUSCULAR; INTRAVENOUS
Status: DISCONTINUED | OUTPATIENT
Start: 2025-05-09 | End: 2025-05-09 | Stop reason: SDUPTHER

## 2025-05-09 RX ORDER — ROCURONIUM BROMIDE 10 MG/ML
INJECTION, SOLUTION INTRAVENOUS
Status: DISCONTINUED | OUTPATIENT
Start: 2025-05-09 | End: 2025-05-09 | Stop reason: SDUPTHER

## 2025-05-09 RX ORDER — METOCLOPRAMIDE HYDROCHLORIDE 5 MG/ML
10 INJECTION INTRAMUSCULAR; INTRAVENOUS
Status: DISCONTINUED | OUTPATIENT
Start: 2025-05-09 | End: 2025-05-09 | Stop reason: HOSPADM

## 2025-05-09 RX ORDER — DIPHENHYDRAMINE HYDROCHLORIDE 50 MG/ML
12.5 INJECTION, SOLUTION INTRAMUSCULAR; INTRAVENOUS
Status: DISCONTINUED | OUTPATIENT
Start: 2025-05-09 | End: 2025-05-09 | Stop reason: HOSPADM

## 2025-05-09 RX ORDER — PHENYLEPHRINE HCL IN 0.9% NACL 1 MG/10 ML
SYRINGE (ML) INTRAVENOUS
Status: DISCONTINUED | OUTPATIENT
Start: 2025-05-09 | End: 2025-05-09 | Stop reason: SDUPTHER

## 2025-05-09 RX ORDER — OXYCODONE HYDROCHLORIDE 5 MG/1
10 TABLET ORAL EVERY 4 HOURS PRN
Status: DISCONTINUED | OUTPATIENT
Start: 2025-05-09 | End: 2025-05-12 | Stop reason: HOSPADM

## 2025-05-09 RX ORDER — OXYCODONE HYDROCHLORIDE 5 MG/1
5 TABLET ORAL EVERY 4 HOURS PRN
Status: DISCONTINUED | OUTPATIENT
Start: 2025-05-09 | End: 2025-05-12 | Stop reason: HOSPADM

## 2025-05-09 RX ORDER — SODIUM CHLORIDE 0.9 % (FLUSH) 0.9 %
5-40 SYRINGE (ML) INJECTION PRN
Status: DISCONTINUED | OUTPATIENT
Start: 2025-05-09 | End: 2025-05-09 | Stop reason: HOSPADM

## 2025-05-09 RX ORDER — CEFAZOLIN SODIUM 2 G/50ML
SOLUTION INTRAVENOUS
Status: DISCONTINUED | OUTPATIENT
Start: 2025-05-09 | End: 2025-05-09 | Stop reason: SDUPTHER

## 2025-05-09 RX ORDER — HYDRALAZINE HYDROCHLORIDE 20 MG/ML
10 INJECTION INTRAMUSCULAR; INTRAVENOUS
Status: DISCONTINUED | OUTPATIENT
Start: 2025-05-09 | End: 2025-05-09 | Stop reason: HOSPADM

## 2025-05-09 RX ORDER — MAGNESIUM HYDROXIDE 1200 MG/15ML
LIQUID ORAL CONTINUOUS PRN
Status: DISCONTINUED | OUTPATIENT
Start: 2025-05-09 | End: 2025-05-09 | Stop reason: HOSPADM

## 2025-05-09 RX ORDER — METHYLPREDNISOLONE ACETATE 40 MG/ML
INJECTION, SUSPENSION INTRA-ARTICULAR; INTRALESIONAL; INTRAMUSCULAR; SOFT TISSUE PRN
Status: DISCONTINUED | OUTPATIENT
Start: 2025-05-09 | End: 2025-05-09 | Stop reason: HOSPADM

## 2025-05-09 RX ORDER — ACETAMINOPHEN 325 MG/1
650 TABLET ORAL EVERY 6 HOURS
Status: DISCONTINUED | OUTPATIENT
Start: 2025-05-09 | End: 2025-05-12 | Stop reason: HOSPADM

## 2025-05-09 RX ADMIN — FENTANYL CITRATE 50 MCG: 0.05 INJECTION, SOLUTION INTRAMUSCULAR; INTRAVENOUS at 10:43

## 2025-05-09 RX ADMIN — ROCURONIUM BROMIDE 30 MG: 50 INJECTION INTRAVENOUS at 09:00

## 2025-05-09 RX ADMIN — HYDROMORPHONE HYDROCHLORIDE 0.5 MG: 1 INJECTION, SOLUTION INTRAMUSCULAR; INTRAVENOUS; SUBCUTANEOUS at 16:42

## 2025-05-09 RX ADMIN — FENTANYL CITRATE 50 MCG: 0.05 INJECTION, SOLUTION INTRAMUSCULAR; INTRAVENOUS at 08:39

## 2025-05-09 RX ADMIN — Medication 100 MCG: at 09:03

## 2025-05-09 RX ADMIN — LIDOCAINE HYDROCHLORIDE 50 MG: 10 INJECTION, SOLUTION EPIDURAL; INFILTRATION; INTRACAUDAL; PERINEURAL at 07:50

## 2025-05-09 RX ADMIN — CEFAZOLIN SODIUM 2000 MG: 2 SOLUTION INTRAVENOUS at 12:08

## 2025-05-09 RX ADMIN — SENNOSIDES AND DOCUSATE SODIUM 1 TABLET: 50; 8.6 TABLET ORAL at 20:06

## 2025-05-09 RX ADMIN — PROPOFOL 200 MG: 10 INJECTION, EMULSION INTRAVENOUS at 07:50

## 2025-05-09 RX ADMIN — SODIUM CHLORIDE, POTASSIUM CHLORIDE, SODIUM LACTATE AND CALCIUM CHLORIDE: 600; 310; 30; 20 INJECTION, SOLUTION INTRAVENOUS at 06:40

## 2025-05-09 RX ADMIN — FENTANYL CITRATE 100 MCG: 0.05 INJECTION, SOLUTION INTRAMUSCULAR; INTRAVENOUS at 07:50

## 2025-05-09 RX ADMIN — ROCURONIUM BROMIDE 20 MG: 50 INJECTION INTRAVENOUS at 09:43

## 2025-05-09 RX ADMIN — LOSARTAN POTASSIUM 100 MG: 50 TABLET, FILM COATED ORAL at 16:41

## 2025-05-09 RX ADMIN — Medication 100 MCG: at 12:16

## 2025-05-09 RX ADMIN — SUGAMMADEX 457 MG: 100 INJECTION, SOLUTION INTRAVENOUS at 14:35

## 2025-05-09 RX ADMIN — ROCURONIUM BROMIDE 50 MG: 50 INJECTION INTRAVENOUS at 07:50

## 2025-05-09 RX ADMIN — SODIUM CHLORIDE: 0.9 INJECTION, SOLUTION INTRAVENOUS at 15:20

## 2025-05-09 RX ADMIN — PHENYLEPHRINE HYDROCHLORIDE 50 MCG/MIN: 10 INJECTION INTRAVENOUS at 07:59

## 2025-05-09 RX ADMIN — Medication 2000 MG: at 20:09

## 2025-05-09 RX ADMIN — ACETAMINOPHEN 650 MG: 325 TABLET ORAL at 16:41

## 2025-05-09 RX ADMIN — CEFAZOLIN SODIUM 2000 MG: 2 SOLUTION INTRAVENOUS at 08:29

## 2025-05-09 RX ADMIN — ROCURONIUM BROMIDE 20 MG: 50 INJECTION INTRAVENOUS at 08:37

## 2025-05-09 RX ADMIN — METOPROLOL SUCCINATE 50 MG: 25 TABLET, EXTENDED RELEASE ORAL at 16:41

## 2025-05-09 RX ADMIN — ONDANSETRON 4 MG: 2 INJECTION, SOLUTION INTRAMUSCULAR; INTRAVENOUS at 14:25

## 2025-05-09 RX ADMIN — OXYCODONE 5 MG: 5 TABLET ORAL at 20:06

## 2025-05-09 RX ADMIN — Medication 100 MCG: at 12:56

## 2025-05-09 RX ADMIN — SODIUM CHLORIDE, PRESERVATIVE FREE 10 ML: 5 INJECTION INTRAVENOUS at 20:05

## 2025-05-09 RX ADMIN — ROCURONIUM BROMIDE 20 MG: 50 INJECTION INTRAVENOUS at 10:24

## 2025-05-09 RX ADMIN — MIDAZOLAM 2 MG: 1 INJECTION INTRAMUSCULAR; INTRAVENOUS at 07:46

## 2025-05-09 RX ADMIN — GABAPENTIN 100 MG: 100 CAPSULE ORAL at 16:42

## 2025-05-09 RX ADMIN — Medication 100 MCG: at 10:22

## 2025-05-09 RX ADMIN — ROCURONIUM BROMIDE 20 MG: 50 INJECTION INTRAVENOUS at 13:45

## 2025-05-09 RX ADMIN — ROCURONIUM BROMIDE 30 MG: 50 INJECTION INTRAVENOUS at 11:35

## 2025-05-09 RX ADMIN — DEXAMETHASONE SODIUM PHOSPHATE 10 MG: 10 INJECTION INTRAMUSCULAR; INTRAVENOUS at 07:59

## 2025-05-09 RX ADMIN — ROCURONIUM BROMIDE 20 MG: 50 INJECTION INTRAVENOUS at 13:18

## 2025-05-09 RX ADMIN — FENTANYL CITRATE 50 MCG: 0.05 INJECTION, SOLUTION INTRAMUSCULAR; INTRAVENOUS at 13:45

## 2025-05-09 RX ADMIN — CYCLOBENZAPRINE 10 MG: 10 TABLET, FILM COATED ORAL at 16:41

## 2025-05-09 RX ADMIN — SODIUM CHLORIDE, POTASSIUM CHLORIDE, SODIUM LACTATE AND CALCIUM CHLORIDE: 600; 310; 30; 20 INJECTION, SOLUTION INTRAVENOUS at 07:51

## 2025-05-09 ASSESSMENT — PAIN SCALES - GENERAL
PAINLEVEL_OUTOF10: 3
PAINLEVEL_OUTOF10: 6
PAINLEVEL_OUTOF10: 5

## 2025-05-09 ASSESSMENT — PAIN DESCRIPTION - ONSET: ONSET: ON-GOING

## 2025-05-09 ASSESSMENT — PAIN - FUNCTIONAL ASSESSMENT
PAIN_FUNCTIONAL_ASSESSMENT: 0-10
PAIN_FUNCTIONAL_ASSESSMENT: ACTIVITIES ARE NOT PREVENTED

## 2025-05-09 ASSESSMENT — PAIN DESCRIPTION - LOCATION
LOCATION: BACK
LOCATION: NECK;BACK

## 2025-05-09 ASSESSMENT — PAIN DESCRIPTION - DESCRIPTORS
DESCRIPTORS: ACHING;DULL
DESCRIPTORS: THROBBING

## 2025-05-09 ASSESSMENT — PAIN DESCRIPTION - ORIENTATION: ORIENTATION: LOWER;UPPER

## 2025-05-09 ASSESSMENT — LIFESTYLE VARIABLES: SMOKING_STATUS: 1

## 2025-05-09 ASSESSMENT — PAIN DESCRIPTION - PAIN TYPE: TYPE: SURGICAL PAIN

## 2025-05-09 ASSESSMENT — PAIN DESCRIPTION - FREQUENCY: FREQUENCY: CONTINUOUS

## 2025-05-09 NOTE — PROGRESS NOTES
Patient to remain flat in bed, parallel to the floor, per Dr. Wren's request. No trendelenburg. C-collar to remain in place.     Leah Man RN

## 2025-05-09 NOTE — ANESTHESIA POSTPROCEDURE EVALUATION
Department of Anesthesiology  Postprocedure Note    Patient: Christiano Wade  MRN: [id number]  YOB: 1970  Date of evaluation: 5/9/2025    Procedure Summary       Date: 05/09/25 Room / Location: [address]    Anesthesia Start: 0746 Anesthesia Stop: 1458    Procedures:       ANTERIOR CERVICAL DISCECTOMY FUSION C4-C6  *1ST PROCEDURE* (DEPUY, MICROSCOPE, C-ARM) (Spine Cervical)      L4-L5 LAMINECTOMY WITH CEREBRAL SPINAL FLUID LEAK REPAIR (MICROSCOPE, C-ARM) (Spine Lumbar) Diagnosis:       Cauda equina syndrome (HCC)      (Cauda equina syndrome (HCC) [G83.4])    Surgeons: Rip Wren DO Responsible Provider: Tomi Thomson MD    Anesthesia Type: general ASA Status: 2            Anesthesia Type: No value filed.    Luiz Phase I: Luiz Score: 8    Luiz Phase II:      Anesthesia Post Evaluation    Patient location during evaluation: PACU  Patient participation: complete - patient participated  Level of consciousness: awake and alert  Airway patency: patent  Nausea & Vomiting: no nausea and no vomiting  Cardiovascular status: blood pressure returned to baseline  Respiratory status: acceptable  Hydration status: euvolemic  Pain management: adequate    No notable events documented.

## 2025-05-09 NOTE — OP NOTE
Operative Note      Patient: Christiano Wade  YOB: 1970  MRN: [id number]    Date of Procedure: 5/9/2025    Preoperative diagnosis cauda equina syndrome and cervical stenosis with myelopathy    Post-Op Diagnosis: Same    Indications for surgery.  Patient with severe cervical stenosis at C4-5 and C5-6 along with severe lumbar stenosis at L4-L5.  Patient presented with symptoms of myelopathy including abnormal dexterity sensation weakness ataxia.  Also with loss of bowel bladder control and weakness in the lower extremities and falls.  Pathology appear to be concomitant lumbar stenosis with claudication in conjunction with made to proceed with simultaneous decompression of the cervical and lumbar spine by anterior cervical deep medically.  Risk and benefits discussed in detail       Procedure  Part 1    Anterior cervical discectomy osteophytectomy to decompress the spinal cord at C4-5 and C5-6  Implantation of titanium interbody cage at C4-5 and C5-6  Anterior fixation with titanium plate and screws at C4 to 6  22 modifier    Part 2    Laminectomy at L4-L5  Primary repair of CSF durotomy  22 modifier    Surgeon(s):  Rip Wren DO Ahammad, Zubair, DO    Assistant:   First Assistant: Jada Fox RN    Anesthesia: General    Estimated Blood Loss (mL): less than 100     Complications: None    Specimens:   * No specimens in log *    Implants:  Implant Name Type Inv. Item Serial No.  Lot No. LRB No. Used Action   GRAFT BNE SUB SM 1ML CRYOPRESERVED VIABLE REBEL CANC BNE - [id number]  GRAFT BNE SUB SM 1ML CRYOPRESERVED VIABLE REBEL CANC BNE [id number] [facility] TISSUE BANK-WD [id number] N/A 1 Implanted   CAGE SPNL 8 DEG LG [id number] MM CERV TI CONDUIT - [id number]  CAGE SPNL 8 DEG LG [id number] MM CERV TI CONDUIT  [facility] DEPPeela SPINE-WD [id number] N/A 1 Implanted   CAGE SPNL 8 DEG LG [id number] MM CERV TI CONDUIT - [id number]  CAGE SPNL 8 DEG LG [id number] MM CERV TI CONDUIT  [facility] [employer]ela

## 2025-05-09 NOTE — PROGRESS NOTES
Sent to floor per stretcher, hooked on full cardiac monitor, on room air  Accompanied by writer and PCT

## 2025-05-09 NOTE — PROGRESS NOTES
Neurosurgery Post op Progress Note      POD# 0    s/p Anterior cervical discectomy and fusion C4-7 and posterior lumbar laminectomy  L4-5    SUBJECTIVE:      Patient presents with cauda equina syndrome as well as cervical stenosis with myelopathy , reports difficulty with dexterity writing tying shoes , gait ataxia     Reports numbness in his left hand only which was like that prior to OR      OBJECTIVE      Physical exam   VITALS:    Vitals:    05/09/25 0600   BP: (!) 141/89   Pulse: 78   Resp: 18   Temp: 97.3 °F (36.3 °C)   SpO2: 96%     INTAKE:    Intake/Output Summary (Last 24 hours) at 5/9/2025 1456  Last data filed at 5/9/2025 1436  Gross per 24 hour   Intake 1000 ml   Output 300 ml   Net 700 ml            Neurological exam   alert, oriented x3, affect appropriate, no focal neurological deficits, moves all extremities well, and no involuntary movements        Wound   Post op wound:  Anterior cervical site intact clean dry covered with surgical glue   Lumbar site closed with surgical glue clean dry no drainage   Drain - anterior cervical drain in place     ASSESSMENT AND PLAN    54 y.o. male status post Anterior cervical discectomy and fusion C4-7 and posterior lumbar laminectomy  L4-5 post op day # 0    - Analgesia: Roxicodone 5-10mg q4hrs PRN dilaudid and flexeril   - Periop Antibiotics: Ancef 2g q8hrs for 3 doses   - Activity: Bedrest flat until possibly Monday   - DVT prophylaxis: SCDs- Lovenox to stary 5/10  - Diet:  Advance as tolerated  - Encourage IS   - pending postop cervical xrays to be completed once patient is no longer required to be flat bedrest   - skinner until no longer on bedrest     Electronically signed by CARSON Camara - NP on 5/9/2025 at 2:56 PM

## 2025-05-09 NOTE — PROGRESS NOTES
Patient arrives to 146 per stretcher from OR. Telemetry applied. Back surgical site SEEMA with glue, and anterior neck incision site SEEMA with glue intact. C-collar intact. STEPHEN drain with small amount of serous drainage.

## 2025-05-09 NOTE — PROGRESS NOTES
Writer spoke with Rosetta from Radiology  Pt needs to sit up at least 90 degrees to do the xray  Pt should be flat on bed at all times

## 2025-05-09 NOTE — H&P
History and Physical    Pt Name: Christiano Wade  MRN: [id number]  YOB: 1970  Date of evaluation: 5/9/2025  Primary Care Physician: Vic Clemons MD    SUBJECTIVE:   History of Chief Complaint:    Christiano Wade is a 54 y.o. male who is scheduled today for ANTERIOR CERVICAL DISCECTOMY FUSION C4-C7; L4-L5 LAMINECTOMY. Patient reports he has been having pain and numbness to all four of his extremities, ongoing for the last two years. He denies any injuries preceding his symptoms. Patient denies any injections but states he did try physical therapy which did mildly help. He has been diagnosed with cauda equina syndrome.   Allergies  has no known allergies.  Medications  Prior to Admission medications    Medication Sig Start Date End Date Taking? Authorizing Provider   gabapentin (NEURONTIN) 100 MG capsule Take 1 capsule by mouth 3 times daily for 30 days. 4/30/25 5/30/25 Yes Vic Clemons MD   Glucosamine-Chondroitin (OSTEO BI-FLEX REGULAR STRENGTH PO) Take by mouth in the morning and at bedtime   Yes Provider, Historical, MD   [facility] Does not apply route Diagnosis: Lumbar radiculopathy and stenosis with claudication  /  Duration: 1 year 3/26/25  Yes Shae Juarez, APRN - CNP   metoprolol succinate (TOPROL XL) 50 MG extended release tablet TAKE 1 TABLET BY MOUTH EVERY DAY 1/2/25  Yes Vic Clemons MD   telmisartan (MICARDIS) 80 MG tablet TAKE 1 TABLET BY MOUTH EVERY DAY 1/2/25  Yes Vic Clemons MD   aspirin 81 MG chewable tablet Take 1 tablet by mouth daily Family doctor recommended; will be stopping 7 days before surgey    ProviderCristian MD   albuterol sulfate HFA (PROVENTIL;VENTOLIN;PROAIR) 108 (90 Base) MCG/ACT inhaler Inhale 2 puffs into the lungs every 6 hours as needed for Wheezing  Patient taking differently: Inhale 2 puffs into the lungs every 6 hours as needed for Wheezing Has not taken in over 6 months 10/1/24   Vic Clemons MD

## 2025-05-09 NOTE — ANESTHESIA PRE PROCEDURE
Department of Anesthesiology  Preprocedure Note       Name:  Chirstiano Wade   Age:  54 y.o.  :  1970                                          MRN:  [id number]         Date:  2025      Surgeon: Surgeon(s):  Rip Wren DO Ahammad, Zubair, DO    Procedure: Procedure(s):  ANTERIOR CERVICAL DISCECTOMY FUSION C4-C7  *1ST PROCEDURE* (SUPINE, MIZUHO BED WITH GEL DONUT, DEPUY, MICROSCOPE, C-ARM)  L4-L5 LAMINECTOMY (PRONE, COSME FRAME ON PUSHPA TABLE, MICROSCOPE, C-ARM)    Medications prior to admission:   Prior to Admission medications    Medication Sig Start Date End Date Taking? Authorizing Provider   gabapentin (NEURONTIN) 100 MG capsule Take 1 capsule by mouth 3 times daily for 30 days. 25 Yes Vic Clemons MD   Glucosamine-Chondroitin (OSTEO BI-FLEX REGULAR STRENGTH PO) Take by mouth in the morning and at bedtime   Yes ProviderCristian MD   Handicap Placard MISC by Does not apply route Diagnosis: Lumbar radiculopathy and stenosis with claudication  /  Duration: 1 year 3/26/25  Yes Shae Juarez, APRN - CNP   metoprolol succinate (TOPROL XL) 50 MG extended release tablet TAKE 1 TABLET BY MOUTH EVERY DAY 25  Yes Vic Clemons MD   telmisartan (MICARDIS) 80 MG tablet TAKE 1 TABLET BY MOUTH EVERY DAY 25  Yes Vic Clemons MD   aspirin 81 MG chewable tablet Take 1 tablet by mouth daily Family doctor recommended; will be stopping 7 days before surgey    ProviderCristian MD   albuterol sulfate HFA (PROVENTIL;VENTOLIN;PROAIR) 108 (90 Base) MCG/ACT inhaler Inhale 2 puffs into the lungs every 6 hours as needed for Wheezing  Patient taking differently: Inhale 2 puffs into the lungs every 6 hours as needed for Wheezing Has not taken in over 6 months 10/1/24   Vic Clemons MD   fluticasone (FLONASE) 50 MCG/ACT nasal spray 2 sprays by Each Nostril route daily  Patient taking differently: 2 sprays by Each Nostril route daily PRN; has not taken in

## 2025-05-10 ENCOUNTER — APPOINTMENT (OUTPATIENT)
Dept: GENERAL RADIOLOGY | Age: 55
DRG: 029 | End: 2025-05-10
Attending: NEUROLOGICAL SURGERY
Payer: COMMERCIAL

## 2025-05-10 LAB
ERYTHROCYTE [DISTWIDTH] IN BLOOD BY AUTOMATED COUNT: 11.9 % (ref 11.8–14.4)
HCT VFR BLD AUTO: 41.6 % (ref 40.7–50.3)
HGB BLD-MCNC: 14.3 G/DL (ref 13–17)
MCH RBC QN AUTO: 32.2 PG (ref 25.2–33.5)
MCHC RBC AUTO-ENTMCNC: 34.4 G/DL (ref 28.4–34.8)
MCV RBC AUTO: 93.7 FL (ref 82.6–102.9)
NRBC BLD-RTO: 0 PER 100 WBC
PLATELET # BLD AUTO: 255 K/UL (ref 138–453)
PMV BLD AUTO: 9.5 FL (ref 8.1–13.5)
RBC # BLD AUTO: 4.44 M/UL (ref 4.21–5.77)
WBC OTHER # BLD: 19.3 K/UL (ref 3.5–11.3)

## 2025-05-10 PROCEDURE — 2500000003 HC RX 250 WO HCPCS: Performed by: NURSE PRACTITIONER

## 2025-05-10 PROCEDURE — 74019 RADEX ABDOMEN 2 VIEWS: CPT

## 2025-05-10 PROCEDURE — 2580000003 HC RX 258: Performed by: NURSE PRACTITIONER

## 2025-05-10 PROCEDURE — 36415 COLL VENOUS BLD VENIPUNCTURE: CPT

## 2025-05-10 PROCEDURE — 85027 COMPLETE CBC AUTOMATED: CPT

## 2025-05-10 PROCEDURE — 6370000000 HC RX 637 (ALT 250 FOR IP): Performed by: NURSE PRACTITIONER

## 2025-05-10 PROCEDURE — 2060000000 HC ICU INTERMEDIATE R&B

## 2025-05-10 PROCEDURE — 6360000002 HC RX W HCPCS: Performed by: NURSE PRACTITIONER

## 2025-05-10 PROCEDURE — 6370000000 HC RX 637 (ALT 250 FOR IP)

## 2025-05-10 RX ORDER — SIMETHICONE 80 MG
80 TABLET,CHEWABLE ORAL EVERY 6 HOURS PRN
Status: DISCONTINUED | OUTPATIENT
Start: 2025-05-10 | End: 2025-05-12 | Stop reason: HOSPADM

## 2025-05-10 RX ADMIN — GABAPENTIN 100 MG: 100 CAPSULE ORAL at 08:13

## 2025-05-10 RX ADMIN — ACETAMINOPHEN 650 MG: 325 TABLET ORAL at 12:34

## 2025-05-10 RX ADMIN — Medication 2000 MG: at 12:34

## 2025-05-10 RX ADMIN — METOPROLOL SUCCINATE 50 MG: 25 TABLET, EXTENDED RELEASE ORAL at 08:13

## 2025-05-10 RX ADMIN — SENNOSIDES AND DOCUSATE SODIUM 1 TABLET: 50; 8.6 TABLET ORAL at 20:00

## 2025-05-10 RX ADMIN — OXYCODONE 5 MG: 5 TABLET ORAL at 00:01

## 2025-05-10 RX ADMIN — SODIUM CHLORIDE: 0.9 INJECTION, SOLUTION INTRAVENOUS at 00:11

## 2025-05-10 RX ADMIN — SIMETHICONE 80 MG: 80 TABLET, CHEWABLE ORAL at 02:07

## 2025-05-10 RX ADMIN — ACETAMINOPHEN 650 MG: 325 TABLET ORAL at 06:00

## 2025-05-10 RX ADMIN — OXYCODONE 5 MG: 5 TABLET ORAL at 08:13

## 2025-05-10 RX ADMIN — ACETAMINOPHEN 650 MG: 325 TABLET ORAL at 23:20

## 2025-05-10 RX ADMIN — OXYCODONE 5 MG: 5 TABLET ORAL at 12:34

## 2025-05-10 RX ADMIN — ENOXAPARIN SODIUM 30 MG: 100 INJECTION SUBCUTANEOUS at 20:00

## 2025-05-10 RX ADMIN — ONDANSETRON 4 MG: 2 INJECTION, SOLUTION INTRAMUSCULAR; INTRAVENOUS at 17:40

## 2025-05-10 RX ADMIN — SIMETHICONE 80 MG: 80 TABLET, CHEWABLE ORAL at 14:07

## 2025-05-10 RX ADMIN — LOSARTAN POTASSIUM 100 MG: 50 TABLET, FILM COATED ORAL at 08:13

## 2025-05-10 RX ADMIN — BISACODYL 10 MG: 10 SUPPOSITORY RECTAL at 06:32

## 2025-05-10 RX ADMIN — GABAPENTIN 100 MG: 100 CAPSULE ORAL at 14:06

## 2025-05-10 RX ADMIN — ACETAMINOPHEN 650 MG: 325 TABLET ORAL at 00:02

## 2025-05-10 RX ADMIN — ENOXAPARIN SODIUM 30 MG: 100 INJECTION SUBCUTANEOUS at 08:13

## 2025-05-10 RX ADMIN — ACETAMINOPHEN 650 MG: 325 TABLET ORAL at 18:31

## 2025-05-10 RX ADMIN — SODIUM CHLORIDE, PRESERVATIVE FREE 10 ML: 5 INJECTION INTRAVENOUS at 20:00

## 2025-05-10 RX ADMIN — GABAPENTIN 100 MG: 100 CAPSULE ORAL at 00:01

## 2025-05-10 RX ADMIN — OXYCODONE 5 MG: 5 TABLET ORAL at 04:04

## 2025-05-10 RX ADMIN — SODIUM CHLORIDE: 0.9 INJECTION, SOLUTION INTRAVENOUS at 19:55

## 2025-05-10 RX ADMIN — CYCLOBENZAPRINE 10 MG: 10 TABLET, FILM COATED ORAL at 00:02

## 2025-05-10 RX ADMIN — Medication 2000 MG: at 04:03

## 2025-05-10 RX ADMIN — GABAPENTIN 100 MG: 100 CAPSULE ORAL at 20:00

## 2025-05-10 RX ADMIN — CYCLOBENZAPRINE 10 MG: 10 TABLET, FILM COATED ORAL at 08:13

## 2025-05-10 RX ADMIN — SENNOSIDES AND DOCUSATE SODIUM 1 TABLET: 50; 8.6 TABLET ORAL at 08:13

## 2025-05-10 ASSESSMENT — PAIN DESCRIPTION - FREQUENCY
FREQUENCY: CONTINUOUS

## 2025-05-10 ASSESSMENT — PAIN SCALES - GENERAL
PAINLEVEL_OUTOF10: 4
PAINLEVEL_OUTOF10: 6
PAINLEVEL_OUTOF10: 0
PAINLEVEL_OUTOF10: 7
PAINLEVEL_OUTOF10: 3

## 2025-05-10 ASSESSMENT — PAIN DESCRIPTION - ONSET
ONSET: ON-GOING

## 2025-05-10 ASSESSMENT — PAIN DESCRIPTION - ORIENTATION
ORIENTATION: POSTERIOR
ORIENTATION: POSTERIOR
ORIENTATION: LOWER

## 2025-05-10 ASSESSMENT — PAIN - FUNCTIONAL ASSESSMENT
PAIN_FUNCTIONAL_ASSESSMENT: PREVENTS OR INTERFERES WITH MANY ACTIVE NOT PASSIVE ACTIVITIES

## 2025-05-10 ASSESSMENT — PAIN DESCRIPTION - DESCRIPTORS
DESCRIPTORS: THROBBING
DESCRIPTORS: ACHING;DISCOMFORT
DESCRIPTORS: ACHING
DESCRIPTORS: ACHING;THROBBING
DESCRIPTORS: THROBBING

## 2025-05-10 ASSESSMENT — PAIN DESCRIPTION - LOCATION
LOCATION: BACK
LOCATION: INCISION;BACK
LOCATION: BACK

## 2025-05-10 ASSESSMENT — PAIN DESCRIPTION - PAIN TYPE
TYPE: SURGICAL PAIN

## 2025-05-10 NOTE — CARE COORDINATION
Case Management Assessment  Initial Evaluation    Date/Time of Evaluation: 5/10/2025 9:51 AM  Assessment Completed by: Burt Mcwilliams    If patient is discharged prior to next notation, then this note serves as note for discharge by case management.    Patient Name: Christiano Wade                   YOB: 1970  Diagnosis: Cauda equina syndrome (HCC) [G83.4]  Stenosis of cervical spine with myelopathy (HCC) [M48.02, G99.2]                   Date / Time: 5/9/2025  5:31 AM    Patient Admission Status: Inpatient   Readmission Risk (Low < 19, Mod (19-27), High > 27): Readmission Risk Score: 8.7    Current PCP: Vic Clemons MD  PCP verified by CM? (P) Yes    Chart Reviewed: Yes      History Provided by: (P) Patient  Patient Orientation: (P) Alert and Oriented    Patient Cognition: (P) Alert    Hospitalization in the last 30 days (Readmission):  No    If yes, Readmission Assessment in CM Navigator will be completed.    Advance Directives:      Code Status: Full Code   Patient's Primary Decision Maker is: (P) Legal Next of Kin      Discharge Planning:    Patient lives with: (P) Family Members Type of Home: House  Primary Care Giver: (P) Self  Patient Support Systems include: (P) Spouse/Significant Other, Family Members   Current Financial resources: (P) Other (Comment)  Current community resources: (P) None  Current services prior to admission: (P) Durable Medical Equipment            Current DME: (P) Walker, [facility]            Type of Home Care services:  (P) None    ADLS  Prior functional level: (P) Independent in ADLs/IADLs  Current functional level: (P) Independent in ADLs/IADLs    PT AM-PAC:   /24  OT AM-PAC:   /24    Family can provide assistance at DC: (P) Yes  Would you like Case Management to discuss the discharge plan with any other family members/significant others, and if so, who? (P) No  Plans to Return to Present Housing: (P) Yes  Other Identified Issues/Barriers to RETURNING to current

## 2025-05-10 NOTE — PROGRESS NOTES
Neurosurgery YANIRA/Resident    Daily Progress Note   No chief complaint on file.    5/10/2025  7:09 AM    Chart reviewed.  No acute events overnight.  No new complaints. POD 1.   Minimal drain output- remove today. Remain flat until Monday. Denies Nausea. KUB negative for Ileus.     Vitals:    05/09/25 2020 05/09/25 2355 05/10/25 0000 05/10/25 0404   BP: 138/74 138/85  (!) 142/86   Pulse: 100 95  94   Resp: 15 18  16   Temp: 98.2 °F (36.8 °C) 98.1 °F (36.7 °C)  98.1 °F (36.7 °C)   TempSrc: Oral Oral  Oral   SpO2: 96% 95% 94% 95%   Weight:       Height:             PE:   AOx3     Motor   L deltoid 5/5; R deltoid 5/5  L biceps 5/5; R biceps 5/5  L triceps 5/5; R triceps 5/5  L wrist extension 5/5; R wrist extension 5/5  L intrinsics 5/5; R intrinsics 5/5      L iliopsoas 5/5 , R iliopsoas 5/5  L quadriceps 5/5; R quadriceps 5/5  L Dorsiflexion 5/5; R dorsiflexion 5/5  L Plantarflexion 5/5; R plantarflexion 5/5  L EHL 5/5; R EHL 5/5    Sensation numbness to Left hand, continues postop. States has worsened     Drain output 20ml / 12 hours   45ml/ 24 hours  Incision CDI      Lab Results   Component Value Date    WBC 19.3 (H) 05/10/2025    HGB 14.3 05/10/2025    HCT 41.6 05/10/2025     05/10/2025    CHOL 239 12/12/2018    TRIG 174 12/12/2018    HDL 37 02/10/2024    ALT 29 05/18/2023    AST 21 05/18/2023     (L) 04/25/2025    K 4.5 04/25/2025     04/25/2025    CREATININE 1.2 04/25/2025    BUN 15 04/25/2025    CO2 20 04/25/2025    PSA 0.39 05/18/2023    GLUF 121 02/10/2024    LABA1C 6.1 03/09/2024       Radiology   FLUORO FOR SURGICAL PROCEDURES  Result Date: 5/9/2025  Radiology exam is complete. No Radiologist dictation. Please follow up with ordering provider.     FLUORO FOR SURGICAL PROCEDURES  Result Date: 5/9/2025  Radiology exam is complete. No Radiologist dictation. Please follow up with ordering provider.         A/P  54 y.o. male who presents with an elective Anterior cervical discectomy and fusion

## 2025-05-10 NOTE — PROGRESS NOTES
Patient states abdominal cramping has increased. Abdomen is distended. Patient does have active bowel sounds x4. Dr Lofton notifed and came to bedside to evaluate the patient. Orders received.

## 2025-05-11 PROCEDURE — 6360000002 HC RX W HCPCS: Performed by: NURSE PRACTITIONER

## 2025-05-11 PROCEDURE — 2060000000 HC ICU INTERMEDIATE R&B

## 2025-05-11 PROCEDURE — 6370000000 HC RX 637 (ALT 250 FOR IP)

## 2025-05-11 PROCEDURE — APPNB15 APP NON BILLABLE TIME 0-15 MINS

## 2025-05-11 PROCEDURE — 2500000003 HC RX 250 WO HCPCS: Performed by: NURSE PRACTITIONER

## 2025-05-11 PROCEDURE — 6370000000 HC RX 637 (ALT 250 FOR IP): Performed by: NURSE PRACTITIONER

## 2025-05-11 PROCEDURE — 2580000003 HC RX 258: Performed by: NURSE PRACTITIONER

## 2025-05-11 RX ADMIN — SIMETHICONE 80 MG: 80 TABLET, CHEWABLE ORAL at 18:27

## 2025-05-11 RX ADMIN — SODIUM CHLORIDE, PRESERVATIVE FREE 10 ML: 5 INJECTION INTRAVENOUS at 09:39

## 2025-05-11 RX ADMIN — LOSARTAN POTASSIUM 100 MG: 50 TABLET, FILM COATED ORAL at 09:39

## 2025-05-11 RX ADMIN — OXYCODONE 5 MG: 5 TABLET ORAL at 04:11

## 2025-05-11 RX ADMIN — GABAPENTIN 100 MG: 100 CAPSULE ORAL at 14:56

## 2025-05-11 RX ADMIN — GABAPENTIN 100 MG: 100 CAPSULE ORAL at 20:55

## 2025-05-11 RX ADMIN — ENOXAPARIN SODIUM 30 MG: 100 INJECTION SUBCUTANEOUS at 20:55

## 2025-05-11 RX ADMIN — ACETAMINOPHEN 650 MG: 325 TABLET ORAL at 04:11

## 2025-05-11 RX ADMIN — METOPROLOL SUCCINATE 50 MG: 25 TABLET, EXTENDED RELEASE ORAL at 09:38

## 2025-05-11 RX ADMIN — SODIUM CHLORIDE: 0.9 INJECTION, SOLUTION INTRAVENOUS at 04:19

## 2025-05-11 RX ADMIN — SENNOSIDES AND DOCUSATE SODIUM 1 TABLET: 50; 8.6 TABLET ORAL at 09:39

## 2025-05-11 RX ADMIN — ACETAMINOPHEN 650 MG: 325 TABLET ORAL at 12:04

## 2025-05-11 RX ADMIN — SENNOSIDES AND DOCUSATE SODIUM 1 TABLET: 50; 8.6 TABLET ORAL at 20:55

## 2025-05-11 RX ADMIN — SODIUM CHLORIDE: 0.9 INJECTION, SOLUTION INTRAVENOUS at 13:54

## 2025-05-11 RX ADMIN — ACETAMINOPHEN 650 MG: 325 TABLET ORAL at 17:02

## 2025-05-11 RX ADMIN — SODIUM CHLORIDE: 0.9 INJECTION, SOLUTION INTRAVENOUS at 23:31

## 2025-05-11 RX ADMIN — CYCLOBENZAPRINE 10 MG: 10 TABLET, FILM COATED ORAL at 17:02

## 2025-05-11 RX ADMIN — ENOXAPARIN SODIUM 30 MG: 100 INJECTION SUBCUTANEOUS at 09:38

## 2025-05-11 RX ADMIN — GABAPENTIN 100 MG: 100 CAPSULE ORAL at 09:38

## 2025-05-11 RX ADMIN — SODIUM CHLORIDE, PRESERVATIVE FREE 10 ML: 5 INJECTION INTRAVENOUS at 20:55

## 2025-05-11 ASSESSMENT — PAIN SCALES - GENERAL
PAINLEVEL_OUTOF10: 6
PAINLEVEL_OUTOF10: 0
PAINLEVEL_OUTOF10: 2
PAINLEVEL_OUTOF10: 4
PAINLEVEL_OUTOF10: 0
PAINLEVEL_OUTOF10: 1

## 2025-05-11 ASSESSMENT — PAIN DESCRIPTION - ORIENTATION
ORIENTATION: POSTERIOR;ANTERIOR
ORIENTATION: LOWER

## 2025-05-11 ASSESSMENT — PAIN DESCRIPTION - DESCRIPTORS
DESCRIPTORS: DISCOMFORT
DESCRIPTORS: DISCOMFORT
DESCRIPTORS: DULL;SHARP

## 2025-05-11 ASSESSMENT — PAIN DESCRIPTION - LOCATION
LOCATION: BACK
LOCATION: GENERALIZED
LOCATION: BACK;ABDOMEN

## 2025-05-11 ASSESSMENT — PAIN - FUNCTIONAL ASSESSMENT
PAIN_FUNCTIONAL_ASSESSMENT: PREVENTS OR INTERFERES SOME ACTIVE ACTIVITIES AND ADLS
PAIN_FUNCTIONAL_ASSESSMENT: ACTIVITIES ARE NOT PREVENTED
PAIN_FUNCTIONAL_ASSESSMENT: ACTIVITIES ARE NOT PREVENTED

## 2025-05-11 NOTE — PROGRESS NOTES
Neurosurgery YANIRA/Resident    Daily Progress Note   No chief complaint on file.    5/11/2025  6:28 AM    Chart reviewed.  No acute events overnight.  No new complaints. POD 2. Remain flat until Monday. Denies Nausea, states he feels much better this morning. Wearing a collar while laying flat in bed  Vitals:    05/11/25 0441 05/11/25 0500 05/11/25 0538 05/11/25 0600   BP:   (!) 158/83    Pulse:  89 (!) 106 (!) 101   Resp: 16 19 17 14   Temp:       TempSrc:       SpO2:  95% 94% 95%   Weight:       Height:             PE:   AOx3     Motor   L deltoid 5/5; R deltoid 5/5  L biceps 5/5; R biceps 5/5  L triceps 5/5; R triceps 5/5  L wrist extension 5/5; R wrist extension 5/5  L intrinsics 5/5; R intrinsics 5/5      L iliopsoas 5/5 , R iliopsoas 5/5  L quadriceps 5/5; R quadriceps 5/5  L Dorsiflexion 5/5; R dorsiflexion 5/5  L Plantarflexion 5/5; R plantarflexion 5/5  L EHL 5/5; R EHL 5/5    Sensation- pt states his numbness he had in his left hand prior to surgery and post op has significantly improved since yesterday. States he can barely tell anymore      Incision CDI      Lab Results   Component Value Date    WBC 19.3 (H) 05/10/2025    HGB 14.3 05/10/2025    HCT 41.6 05/10/2025     05/10/2025    CHOL 239 12/12/2018    TRIG 174 12/12/2018    HDL 37 02/10/2024    ALT 29 05/18/2023    AST 21 05/18/2023     (L) 04/25/2025    K 4.5 04/25/2025     04/25/2025    CREATININE 1.2 04/25/2025    BUN 15 04/25/2025    CO2 20 04/25/2025    PSA 0.39 05/18/2023    GLUF 121 02/10/2024    LABA1C 6.1 03/09/2024       Radiology   XR ABDOMEN (2 VIEWS)  Result Date: 5/10/2025  EXAMINATION: TWO XRAY VIEWS OF THE ABDOMEN 5/10/2025 7:14 am COMPARISON: None. HISTORY: ORDERING SYSTEM PROVIDED HISTORY: abdominal distention TECHNOLOGIST PROVIDED HISTORY: Patient must stay flat abdominal distention FINDINGS: Gaseous distended small bowel loops and colon.  No free air seen. Imaged lung bases are clear.     Gaseous distention of

## 2025-05-12 ENCOUNTER — APPOINTMENT (OUTPATIENT)
Dept: GENERAL RADIOLOGY | Age: 55
DRG: 029 | End: 2025-05-12
Attending: NEUROLOGICAL SURGERY
Payer: COMMERCIAL

## 2025-05-12 VITALS
OXYGEN SATURATION: 99 % | TEMPERATURE: 98.3 F | DIASTOLIC BLOOD PRESSURE: 86 MMHG | HEIGHT: 68 IN | BODY MASS INDEX: 38.19 KG/M2 | SYSTOLIC BLOOD PRESSURE: 135 MMHG | RESPIRATION RATE: 15 BRPM | WEIGHT: 252 LBS | HEART RATE: 89 BPM

## 2025-05-12 PROCEDURE — 6370000000 HC RX 637 (ALT 250 FOR IP): Performed by: NURSE PRACTITIONER

## 2025-05-12 PROCEDURE — 6370000000 HC RX 637 (ALT 250 FOR IP)

## 2025-05-12 PROCEDURE — APPSS30 APP SPLIT SHARED TIME 16-30 MINUTES: Performed by: REGISTERED NURSE

## 2025-05-12 PROCEDURE — 2580000003 HC RX 258: Performed by: NURSE PRACTITIONER

## 2025-05-12 PROCEDURE — 2500000003 HC RX 250 WO HCPCS: Performed by: NURSE PRACTITIONER

## 2025-05-12 PROCEDURE — 72040 X-RAY EXAM NECK SPINE 2-3 VW: CPT

## 2025-05-12 PROCEDURE — 6360000002 HC RX W HCPCS: Performed by: NURSE PRACTITIONER

## 2025-05-12 RX ORDER — OXYCODONE HYDROCHLORIDE 5 MG/1
5-10 TABLET ORAL EVERY 6 HOURS PRN
Qty: 56 TABLET | Refills: 0 | Status: SHIPPED | OUTPATIENT
Start: 2025-05-12 | End: 2025-05-19

## 2025-05-12 RX ORDER — CYCLOBENZAPRINE HCL 10 MG
10 TABLET ORAL 3 TIMES DAILY PRN
Qty: 30 TABLET | Refills: 0 | Status: SHIPPED | OUTPATIENT
Start: 2025-05-12 | End: 2025-05-22

## 2025-05-12 RX ADMIN — ENOXAPARIN SODIUM 30 MG: 100 INJECTION SUBCUTANEOUS at 10:38

## 2025-05-12 RX ADMIN — SODIUM CHLORIDE: 0.9 INJECTION, SOLUTION INTRAVENOUS at 10:42

## 2025-05-12 RX ADMIN — GABAPENTIN 100 MG: 100 CAPSULE ORAL at 15:06

## 2025-05-12 RX ADMIN — OXYCODONE 10 MG: 5 TABLET ORAL at 15:06

## 2025-05-12 RX ADMIN — SIMETHICONE 80 MG: 80 TABLET, CHEWABLE ORAL at 05:20

## 2025-05-12 RX ADMIN — ACETAMINOPHEN 650 MG: 325 TABLET ORAL at 05:20

## 2025-05-12 RX ADMIN — SODIUM CHLORIDE, PRESERVATIVE FREE 10 ML: 5 INJECTION INTRAVENOUS at 10:40

## 2025-05-12 RX ADMIN — ACETAMINOPHEN 650 MG: 325 TABLET ORAL at 10:37

## 2025-05-12 RX ADMIN — GABAPENTIN 100 MG: 100 CAPSULE ORAL at 10:37

## 2025-05-12 RX ADMIN — ACETAMINOPHEN 650 MG: 325 TABLET ORAL at 00:42

## 2025-05-12 RX ADMIN — SENNOSIDES AND DOCUSATE SODIUM 1 TABLET: 50; 8.6 TABLET ORAL at 10:38

## 2025-05-12 RX ADMIN — OXYCODONE 5 MG: 5 TABLET ORAL at 05:20

## 2025-05-12 RX ADMIN — LOSARTAN POTASSIUM 100 MG: 50 TABLET, FILM COATED ORAL at 10:38

## 2025-05-12 RX ADMIN — METOPROLOL SUCCINATE 50 MG: 25 TABLET, EXTENDED RELEASE ORAL at 10:37

## 2025-05-12 ASSESSMENT — PAIN SCALES - GENERAL
PAINLEVEL_OUTOF10: 3
PAINLEVEL_OUTOF10: 6
PAINLEVEL_OUTOF10: 6

## 2025-05-12 ASSESSMENT — PAIN DESCRIPTION - DESCRIPTORS: DESCRIPTORS: DISCOMFORT

## 2025-05-12 ASSESSMENT — PAIN DESCRIPTION - LOCATION
LOCATION: BACK
LOCATION: BACK

## 2025-05-12 NOTE — PLAN OF CARE
Drain Removal Note    Anterior cervical STEPHEN drain    Drain removed from suction, prepped with betadine and sterile towels placed to create sterile field. Drain suture cut and drain removed. A single 2x2 and tegaderm placed overr site.     No complications, patient tolerated procedure well.    Electronically signed by CARSON Ramirez CNP on 5/10/2025 at 5:29 PM        
  Problem: Discharge Planning  Goal: Discharge to home or other facility with appropriate resources  5/11/2025 0251 by Alva Almanza RN  Outcome: Progressing  Flowsheets (Taken 5/10/2025 1954)  Discharge to home or other facility with appropriate resources:   Identify barriers to discharge with patient and caregiver   Arrange for needed discharge resources and transportation as appropriate   Identify discharge learning needs (meds, wound care, etc)   Refer to discharge planning if patient needs post-hospital services based on physician order or complex needs related to functional status, cognitive ability or social support system  5/10/2025 1535 by Brooke Xavier RN  Outcome: Progressing     Problem: Pain  Goal: Verbalizes/displays adequate comfort level or baseline comfort level  5/11/2025 0251 by Alva Almanza RN  Outcome: Progressing  Flowsheets (Taken 5/10/2025 1954)  Verbalizes/displays adequate comfort level or baseline comfort level:   Encourage patient to monitor pain and request assistance   Assess pain using appropriate pain scale   Administer analgesics based on type and severity of pain and evaluate response   Implement non-pharmacological measures as appropriate and evaluate response   Consider cultural and social influences on pain and pain management   Notify Licensed Independent Practitioner if interventions unsuccessful or patient reports new pain  5/10/2025 1535 by Brooke Xavier RN  Outcome: Progressing     Problem: Safety - Adult  Goal: Free from fall injury  5/11/2025 0251 by Alva Almanza RN  Outcome: Progressing  Flowsheets (Taken 5/10/2025 2152)  Free From Fall Injury:   Instruct family/caregiver on patient safety   Based on caregiver fall risk screen, instruct family/caregiver to ask for assistance with transferring infant if caregiver noted to have fall risk factors  5/10/2025 1535 by Brooke Xavier, RN  Outcome: Progressing     Problem: Skin/Tissue Integrity  Goal: Skin 
  Problem: Discharge Planning  Goal: Discharge to home or other facility with appropriate resources  Outcome: Progressing     Problem: Pain  Goal: Verbalizes/displays adequate comfort level or baseline comfort level  Outcome: Progressing     Problem: Safety - Adult  Goal: Free from fall injury  Outcome: Progressing     Problem: Skin/Tissue Integrity  Goal: Skin integrity remains intact  Description: 1.  Monitor for areas of redness and/or skin breakdown2.  Assess vascular access sites hourly3.  Every 4-6 hours minimum:  Change oxygen saturation probe site4.  Every 4-6 hours:  If on nasal continuous positive airway pressure, respiratory therapy assess nares and determine need for appliance change or resting period  Outcome: Progressing     
  Problem: Discharge Planning  Goal: Discharge to home or other facility with appropriate resources  Outcome: Progressing  Flowsheets (Taken 5/11/2025 0938)  Discharge to home or other facility with appropriate resources:   Identify barriers to discharge with patient and caregiver   Arrange for needed discharge resources and transportation as appropriate   Identify discharge learning needs (meds, wound care, etc)   Refer to discharge planning if patient needs post-hospital services based on physician order or complex needs related to functional status, cognitive ability or social support system     Problem: Pain  Goal: Verbalizes/displays adequate comfort level or baseline comfort level  Outcome: Progressing  Flowsheets (Taken 5/11/2025 0938)  Verbalizes/displays adequate comfort level or baseline comfort level: Encourage patient to monitor pain and request assistance     Problem: Safety - Adult  Goal: Free from fall injury  Outcome: Progressing  Flowsheets (Taken 5/11/2025 0700)  Free From Fall Injury: Instruct family/caregiver on patient safety     Problem: Skin/Tissue Integrity  Goal: Skin integrity remains intact  Description: 1.  Monitor for areas of redness and/or skin breakdown2.  Assess vascular access sites hourly3.  Every 4-6 hours minimum:  Change oxygen saturation probe site4.  Every 4-6 hours:  If on nasal continuous positive airway pressure, respiratory therapy assess nares and determine need for appliance change or resting period  Outcome: Progressing  Flowsheets (Taken 5/11/2025 0700)  Skin Integrity Remains Intact:   Monitor for areas of redness and/or skin breakdown   Assess vascular access sites hourly   Every 4-6 hours minimum:  Change oxygen saturation probe site     Problem: Neurosensory - Adult  Goal: Achieves stable or improved neurological status  Outcome: Progressing  Flowsheets (Taken 5/11/2025 0938)  Achieves stable or improved neurological status:   Assess for and report changes in 
  Problem: Discharge Planning  Goal: Discharge to home or other facility with appropriate resources  Recent Flowsheet Documentation  Taken 5/9/2025 2000 by Alma Hartley RN  Discharge to home or other facility with appropriate resources: Identify barriers to discharge with patient and caregiver  Taken 5/9/2025 1600 by Stephany Fraser RN  Discharge to home or other facility with appropriate resources:   Identify barriers to discharge with patient and caregiver   Arrange for needed discharge resources and transportation as appropriate   Identify discharge learning needs (meds, wound care, etc)   Refer to discharge planning if patient needs post-hospital services based on physician order or complex needs related to functional status, cognitive ability or social support system     Problem: Pain  Goal: Verbalizes/displays adequate comfort level or baseline comfort level  Recent Flowsheet Documentation  Taken 5/9/2025 2000 by Alma Hartley RN  Verbalizes/displays adequate comfort level or baseline comfort level: Assess pain using appropriate pain scale     
by McDole, Nadir, RN  Outcome: Completed  Flowsheets (Taken 5/12/2025 0800)  Achieves stable or improved neurological status: Assess for and report changes in neurological status  5/12/2025 0646 by Zoraida Mccann RN  Outcome: Progressing  Goal: Achieves maximal functionality and self care  5/12/2025 1609 by Nadir Valdez RN  Outcome: Completed  Flowsheets (Taken 5/12/2025 0800)  Achieves maximal functionality and self care: Monitor swallowing and airway patency with patient fatigue and changes in neurological status  5/12/2025 0646 by Zoraida Mccann RN  Outcome: Progressing     Problem: Skin/Tissue Integrity - Adult  Goal: Skin integrity remains intact  Description: 1.  Monitor for areas of redness and/or skin breakdown2.  Assess vascular access sites hourly3.  Every 4-6 hours minimum:  Change oxygen saturation probe site4.  Every 4-6 hours:  If on nasal continuous positive airway pressure, respiratory therapy assess nares and determine need for appliance change or resting period  5/12/2025 1609 by Nadir Valdez RN  Outcome: Completed  Flowsheets (Taken 5/12/2025 0800)  Skin Integrity Remains Intact: Monitor for areas of redness and/or skin breakdown  5/12/2025 0646 by Zoraida Mccann RN  Outcome: Progressing  Goal: Incisions, wounds, or drain sites healing without S/S of infection  5/12/2025 1609 by Nadir Valdez RN  Outcome: Completed  Flowsheets (Taken 5/12/2025 0800)  Incisions, Wounds, or Drain Sites Healing Without Sign and Symptoms of Infection: ADMISSION and DAILY: Assess and document risk factors for pressure ulcer development  5/12/2025 0646 by Zoraida Mccann RN  Outcome: Progressing  Goal: Oral mucous membranes remain intact  5/12/2025 1609 by Nadir Valdez RN  Outcome: Completed  Flowsheets (Taken 5/12/2025 0800)  Oral Mucous Membranes Remain Intact: Assess oral mucosa and hygiene practices  5/12/2025 0646 by Zoraida Mccann RN  Outcome: Progressing     Problem: 
S/S of infection  5/12/2025 0646 by Zoraida Mccann RN  Outcome: Progressing  5/11/2025 1811 by Whitney Jaramillo RN  Outcome: Progressing  Flowsheets (Taken 5/11/2025 0700)  Incisions, Wounds, or Drain Sites Healing Without Sign and Symptoms of Infection:   ADMISSION and DAILY: Assess and document risk factors for pressure ulcer development   TWICE DAILY: Assess and document skin integrity  Goal: Oral mucous membranes remain intact  5/12/2025 0646 by Zoraida Mccann RN  Outcome: Progressing  5/11/2025 1811 by Whitney Jaramillo RN  Outcome: Progressing  Flowsheets (Taken 5/11/2025 0700)  Oral Mucous Membranes Remain Intact:   Assess oral mucosa and hygiene practices   Implement preventative oral hygiene regimen     Problem: Gastrointestinal - Adult  Goal: Minimal or absence of nausea and vomiting  5/12/2025 0646 by oZraida Mccann RN  Outcome: Progressing  5/11/2025 1811 by Whitney Jaramillo RN  Outcome: Progressing  Flowsheets (Taken 5/11/2025 0938)  Minimal or absence of nausea and vomiting:   Administer IV fluids as ordered to ensure adequate hydration   Maintain NPO status until nausea and vomiting are resolved  Goal: Maintains or returns to baseline bowel function  5/12/2025 0646 by Zoraida Mccann RN  Outcome: Progressing  5/11/2025 1811 by Whitney Jaramillo RN  Outcome: Progressing  Flowsheets (Taken 5/11/2025 0938)  Maintains or returns to baseline bowel function:   Assess bowel function   Encourage oral fluids to ensure adequate hydration  Goal: Maintains adequate nutritional intake  5/12/2025 0646 by Zoraida Mccann RN  Outcome: Progressing  5/11/2025 1811 by Whitney Jaramillo RN  Outcome: Progressing  Flowsheets (Taken 5/11/2025 0938)  Maintains adequate nutritional intake:   Monitor percentage of each meal consumed   Identify factors contributing to decreased intake, treat as appropriate     Problem: Genitourinary - Adult  Goal: Absence of urinary retention  5/12/2025 0646 by Zoraida Mccann

## 2025-05-12 NOTE — PROGRESS NOTES
Neurosurgery YANIRA/Resident    Daily Progress Note   No chief complaint on file.    5/12/2025  10:57 AM    Chart reviewed.  No acute events overnight.  No new complaints. Pain well controlled. Reports poor appetite. Denies nausea. Passing flatus but reports he does feel abdomen is more distended than normal. Hernandez cath in place.   Lumbar incision site open to air, site dry this AM.   Reports some numbness in left hand/arm after surgery but completely resolved.     Vitals:    05/11/25 2102 05/12/25 0046 05/12/25 0540 05/12/25 1033   BP: (!) 151/76 (!) 148/87 (!) 143/61 (!) 189/91   Pulse: 88 83 81 91   Resp: 18 17 19 21   Temp: 98.1 °F (36.7 °C) 98.9 °F (37.2 °C) 99 °F (37.2 °C) 98.2 °F (36.8 °C)   TempSrc: Oral Oral Oral Oral   SpO2: 96% 98% 97% 98%   Weight:       Height:             PE:   AOx3   Motor   L deltoid 5/5; R deltoid 5/5  L biceps 5/5; R biceps 5/5  L triceps 5/5; R triceps 5/5  L wrist extension 5/5; R wrist extension 5/5  L intrinsics 5/5; R intrinsics 5/5      L iliopsoas 5/5 , R iliopsoas 5/5  L quadriceps 5/5; R quadriceps 5/5  L Dorsiflexion 5/5; R dorsiflexion 5/5  L Plantarflexion 5/5; R plantarflexion 5/5  L EHL 5/5; R EHL 5/5    Sensation intact    Incision anterior cervical and lumbar sites open to air, c/d      Lab Results   Component Value Date    WBC 19.3 (H) 05/10/2025    HGB 14.3 05/10/2025    HCT 41.6 05/10/2025     05/10/2025    CHOL 239 12/12/2018    TRIG 174 12/12/2018    HDL 37 02/10/2024    ALT 29 05/18/2023    AST 21 05/18/2023     (L) 04/25/2025    K 4.5 04/25/2025     04/25/2025    CREATININE 1.2 04/25/2025    BUN 15 04/25/2025    CO2 20 04/25/2025    PSA 0.39 05/18/2023    GLUF 121 02/10/2024    LABA1C 6.1 03/09/2024         A/P  54 y.o. male who presents with cauda equina syndrome and cervical stenosis with myelopathy  POD 3 s/p anterior cervical discectomy and fusion C4-7 and posterior lumbar laminectomy L4-5     - okay to sit up today, monitor lumbar site

## 2025-05-12 NOTE — PROGRESS NOTES
Pt d/c from hospital via wheelchair with RN assistance, all belongings, and discharge summary with pt. Pt verbalized understanding via feedback and all questions were answered. Pt picking up prescriptions from outpatient pharmacy.

## 2025-05-12 NOTE — DISCHARGE INSTRUCTIONS
Anterior Cervical and Lumbar Spine Surgery Discharge Instructions     Thank you for choosing [facility] and [facility] for your surgical needs. The following instructions will help to ensure your comfort and that you are well prepared after your surgery.       Post-Operative Visit:   The office is located at:    [facility]    [facility] [address], [address], [place] [address]    [phone]     Please also call your primary care physician to schedule an appointment for further evaluation and care.     Nutrition:   You may resume your regular diet.   It is normal to have a sore throat and some difficulty swallowing solid foods. You are able to resume your regular diet, however you may be more comfortable at first eating softer foods such as mashed potatoes, ice cream, jello or soup.   Be sure to eat a well-balanced diet. Protein promotes wound healing.   Pain medication and decreased activity can cause constipation. Drink 8-10 glasses of water a day, eat fresh fruits and vegetables, and add prunes, raisins and bran cereals to your diet if you do become constipated. A stool softener taken 1-2 times a day is helpful. Dulcolax suppositories or Fleets enemas are also available without a prescription. Call our office if the problem continues.     Activity and Exercise:   No driving until you are seen in the office.   Avoid riding in a car for the first two weeks until you come to the office for your scheduled follow-up.   Start taking short, frequent walks in the beginning. [place], more frequent walks throughout the day are more beneficial than one long walk each day. You may gradually increase the distance; as tolerated. Your brace will help give support to your muscles while you walk.   If your pain increases, you may be walking too much or too far. Try backing off for a day or two and then resume slowly.   No lifting

## 2025-05-13 ENCOUNTER — TELEPHONE (OUTPATIENT)
Dept: NEUROSURGERY | Age: 55
End: 2025-05-13

## 2025-05-13 NOTE — DISCHARGE SUMMARY
Department of Neurosurgery                                            Discharge Summary       PATIENT NAME: Christiano Wade  YOB: 1970  MEDICAL RECORD NO. [id number]  DATE: 5/13/2025  PRIMARY CARE PHYSICIAN: Vic Clemons MD  DISCHARGE DATE:  5/12/2025  4:13 PM  DISCHARGE DIAGNOSIS:   Patient Active Problem List   Diagnosis Code    Family history of prostate cancer Z80.42    Refused pneumococcal vaccination Z28.21    High cholesterol E78.00    Essential (primary) hypertension I10    Stenosis of cervical spine with myelopathy (HCC) M48.02, G99.2     DISPOSITION: Home    PROCEDURES:    Part 1  Anterior cervical discectomy osteophytectomy to decompress the spinal cord at C4-5 and C5-6  Implantation of titanium interbody cage at C4-5 and C5-6  Anterior fixation with titanium plate and screws at C4 to 6  22 modifier     Part 2  Laminectomy at L4-L5  Primary repair of CSF durotomy  22 modifier       HOSPITAL COURSE     Christiano Wade originally presented to the hospital on 5/9/2025  5:31 AM with cauda equina syndrome and cervical stenosis with myelopathy. He was admitted and taken to the OR for neurosurgery for procedure listed above. There was repair of CSF durotomy so patient was on bedrest with HOB flat until POD 3. He slowly was sat up monitoring for any drainage from incision site. Site remained dry. Patient was able to ambulate. Hernandez was removed and was able to void.     Labs and imaging were followed daily.  At time of discharge, Christiano Wade was tolerating a regular diet, passing flatus, ambulating on his own accord and had adequate analgesia on oral pain medications, and had no signs of symptoms of complications.  He is medically stable to be discharged.       PHYSICAL EXAMINATION        Discharge Vitals:  height is 1.727 m (5' 8\") and weight is 114.3 kg (252 lb). His oral temperature is 98.3 °F (36.8 °C). His blood pressure is 135/86 and his pulse is 89. His respiration is 15 and

## 2025-05-19 ENCOUNTER — TELEPHONE (OUTPATIENT)
Dept: NEUROSURGERY | Age: 55
End: 2025-05-19

## 2025-05-19 ENCOUNTER — APPOINTMENT (OUTPATIENT)
Dept: ULTRASOUND IMAGING | Age: 55
End: 2025-05-19
Payer: COMMERCIAL

## 2025-05-19 ENCOUNTER — APPOINTMENT (OUTPATIENT)
Dept: CT IMAGING | Age: 55
End: 2025-05-19
Payer: COMMERCIAL

## 2025-05-19 ENCOUNTER — HOSPITAL ENCOUNTER (EMERGENCY)
Age: 55
Discharge: HOME | End: 2025-05-20
Attending: EMERGENCY MEDICINE
Payer: COMMERCIAL

## 2025-05-19 DIAGNOSIS — K52.9 GASTROENTERITIS: Primary | ICD-10-CM

## 2025-05-19 LAB
ALBUMIN SERPL-MCNC: 4.2 G/DL (ref 3.5–5.2)
ALBUMIN/GLOB SERPL: 1.1 {RATIO} (ref 1–2.5)
ALP SERPL-CCNC: 76 U/L (ref 40–129)
ALT SERPL-CCNC: 101 U/L (ref 10–50)
ANION GAP SERPL CALCULATED.3IONS-SCNC: 16 MMOL/L (ref 9–16)
AST SERPL-CCNC: 45 U/L (ref 10–50)
BASOPHILS # BLD: 0.06 K/UL (ref 0–0.2)
BASOPHILS NFR BLD: 0 % (ref 0–2)
BILIRUB DIRECT SERPL-MCNC: 0.1 MG/DL (ref 0–0.2)
BILIRUB INDIRECT SERPL-MCNC: 0.3 MG/DL (ref 0–1)
BILIRUB SERPL-MCNC: 0.4 MG/DL (ref 0–1.2)
BUN SERPL-MCNC: 20 MG/DL (ref 6–20)
CALCIUM SERPL-MCNC: 9.8 MG/DL (ref 8.6–10.4)
CHLORIDE SERPL-SCNC: 96 MMOL/L (ref 98–107)
CO2 SERPL-SCNC: 22 MMOL/L (ref 20–31)
CREAT SERPL-MCNC: 1.2 MG/DL (ref 0.7–1.2)
EOSINOPHIL # BLD: 0.09 K/UL (ref 0–0.44)
EOSINOPHILS RELATIVE PERCENT: 1 % (ref 1–4)
ERYTHROCYTE [DISTWIDTH] IN BLOOD BY AUTOMATED COUNT: 11.6 % (ref 11.8–14.4)
GFR, ESTIMATED: 72 ML/MIN/1.73M2
GLOBULIN SER CALC-MCNC: 3.7 G/DL
GLUCOSE SERPL-MCNC: 140 MG/DL (ref 74–99)
HCT VFR BLD AUTO: 45.5 % (ref 40.7–50.3)
HGB BLD-MCNC: 16.2 G/DL (ref 13–17)
IMM GRANULOCYTES # BLD AUTO: 0.07 K/UL (ref 0–0.3)
IMM GRANULOCYTES NFR BLD: 1 %
LIPASE SERPL-CCNC: 153 U/L (ref 13–60)
LYMPHOCYTES NFR BLD: 2.4 K/UL (ref 1.1–3.7)
LYMPHOCYTES RELATIVE PERCENT: 16 % (ref 24–43)
MCH RBC QN AUTO: 31.6 PG (ref 25.2–33.5)
MCHC RBC AUTO-ENTMCNC: 35.6 G/DL (ref 28.4–34.8)
MCV RBC AUTO: 88.9 FL (ref 82.6–102.9)
MONOCYTES NFR BLD: 0.81 K/UL (ref 0.1–1.2)
MONOCYTES NFR BLD: 6 % (ref 3–12)
NEUTROPHILS NFR BLD: 77 % (ref 36–65)
NEUTS SEG NFR BLD: 11.17 K/UL (ref 1.5–8.1)
NRBC BLD-RTO: 0 PER 100 WBC
PLATELET # BLD AUTO: ABNORMAL K/UL (ref 138–453)
PLATELET, FLUORESCENCE: 515 K/UL (ref 138–453)
PLATELETS.RETICULATED NFR BLD AUTO: 1.7 % (ref 1.1–10.3)
POTASSIUM SERPL-SCNC: 4 MMOL/L (ref 3.7–5.3)
PROT SERPL-MCNC: 7.9 G/DL (ref 6.6–8.7)
RBC # BLD AUTO: 5.12 M/UL (ref 4.21–5.77)
SODIUM SERPL-SCNC: 134 MMOL/L (ref 136–145)
WBC OTHER # BLD: 14.6 K/UL (ref 3.5–11.3)

## 2025-05-19 PROCEDURE — 6360000004 HC RX CONTRAST MEDICATION

## 2025-05-19 PROCEDURE — 2580000003 HC RX 258

## 2025-05-19 PROCEDURE — 99285 EMERGENCY DEPT VISIT HI MDM: CPT

## 2025-05-19 PROCEDURE — 96374 THER/PROPH/DIAG INJ IV PUSH: CPT

## 2025-05-19 PROCEDURE — 76705 ECHO EXAM OF ABDOMEN: CPT

## 2025-05-19 PROCEDURE — 96361 HYDRATE IV INFUSION ADD-ON: CPT

## 2025-05-19 PROCEDURE — 85025 COMPLETE CBC W/AUTO DIFF WBC: CPT

## 2025-05-19 PROCEDURE — 74177 CT ABD & PELVIS W/CONTRAST: CPT

## 2025-05-19 PROCEDURE — 96375 TX/PRO/DX INJ NEW DRUG ADDON: CPT

## 2025-05-19 PROCEDURE — 6360000002 HC RX W HCPCS

## 2025-05-19 PROCEDURE — 83690 ASSAY OF LIPASE: CPT

## 2025-05-19 PROCEDURE — 80076 HEPATIC FUNCTION PANEL: CPT

## 2025-05-19 PROCEDURE — 85055 RETICULATED PLATELET ASSAY: CPT

## 2025-05-19 PROCEDURE — 96372 THER/PROPH/DIAG INJ SC/IM: CPT

## 2025-05-19 PROCEDURE — 6360000002 HC RX W HCPCS: Performed by: EMERGENCY MEDICINE

## 2025-05-19 PROCEDURE — 80048 BASIC METABOLIC PNL TOTAL CA: CPT

## 2025-05-19 RX ORDER — DICYCLOMINE HYDROCHLORIDE 10 MG/ML
20 INJECTION INTRAMUSCULAR ONCE
Status: COMPLETED | OUTPATIENT
Start: 2025-05-19 | End: 2025-05-19

## 2025-05-19 RX ORDER — PROMETHAZINE HYDROCHLORIDE 25 MG/ML
12.5 INJECTION, SOLUTION INTRAMUSCULAR; INTRAVENOUS ONCE
Status: COMPLETED | OUTPATIENT
Start: 2025-05-19 | End: 2025-05-19

## 2025-05-19 RX ORDER — IOPAMIDOL 755 MG/ML
75 INJECTION, SOLUTION INTRAVASCULAR
Status: COMPLETED | OUTPATIENT
Start: 2025-05-19 | End: 2025-05-19

## 2025-05-19 RX ORDER — 0.9 % SODIUM CHLORIDE 0.9 %
1000 INTRAVENOUS SOLUTION INTRAVENOUS ONCE
Status: COMPLETED | OUTPATIENT
Start: 2025-05-19 | End: 2025-05-19

## 2025-05-19 RX ORDER — ONDANSETRON 2 MG/ML
4 INJECTION INTRAMUSCULAR; INTRAVENOUS ONCE
Status: COMPLETED | OUTPATIENT
Start: 2025-05-19 | End: 2025-05-19

## 2025-05-19 RX ADMIN — ONDANSETRON 4 MG: 2 INJECTION, SOLUTION INTRAMUSCULAR; INTRAVENOUS at 17:58

## 2025-05-19 RX ADMIN — IOPAMIDOL 75 ML: 755 INJECTION, SOLUTION INTRAVENOUS at 22:24

## 2025-05-19 RX ADMIN — PROMETHAZINE HYDROCHLORIDE 12.5 MG: 25 INJECTION INTRAMUSCULAR; INTRAVENOUS at 20:25

## 2025-05-19 RX ADMIN — SODIUM CHLORIDE 1000 ML: 0.9 INJECTION, SOLUTION INTRAVENOUS at 17:56

## 2025-05-19 RX ADMIN — FAMOTIDINE 20 MG: 10 INJECTION, SOLUTION INTRAVENOUS at 17:57

## 2025-05-19 RX ADMIN — DICYCLOMINE HYDROCHLORIDE 20 MG: 10 INJECTION, SOLUTION INTRAMUSCULAR at 18:00

## 2025-05-19 ASSESSMENT — PAIN SCALES - GENERAL: PAINLEVEL_OUTOF10: 7

## 2025-05-19 ASSESSMENT — LIFESTYLE VARIABLES
HOW MANY STANDARD DRINKS CONTAINING ALCOHOL DO YOU HAVE ON A TYPICAL DAY: PATIENT DOES NOT DRINK
HOW OFTEN DO YOU HAVE A DRINK CONTAINING ALCOHOL: NEVER

## 2025-05-19 ASSESSMENT — PAIN DESCRIPTION - LOCATION: LOCATION: ABDOMEN

## 2025-05-19 ASSESSMENT — PAIN DESCRIPTION - DESCRIPTORS: DESCRIPTORS: CRAMPING

## 2025-05-19 NOTE — ED NOTES
Pt c/o cramping that comes in waves across abd with N/V/D. States he has little appetite and easily satiated. Has recent  back surgery, called surgeon and described symptoms and told to come to ED . Not taking darleen for pain , states he does not like how it makes him feel. Using neurontin for pain with good relief for the back pain , states he has little warning when he needs to have a BM or vomit

## 2025-05-19 NOTE — TELEPHONE ENCOUNTER
Patient's wife Kat called in to report he's been nauseated and vomiting with stomach cramps and diarrhea. He tells her he's hungry but can only eat one bite of food for the day. When he vomits its a neon yellow/green tastes and smells metallic. He has told her he feels warm but his wife states he doesn't feel feverish. He stopped taking pain medication 3 days ago because they thought the symptoms were related. However, symptoms have continued.   He has lost 20lbs since surgery.       5/9/2025   ANTERIOR CERVICAL DISCECTOMY FUSION C4-C6  *1ST PROCEDURE*

## 2025-05-19 NOTE — TELEPHONE ENCOUNTER
Patient is informed, he's going to [facility] ED.    V-Y Plasty Text: The defect edges were debeveled with a #15 scalpel blade.  Given the location of the defect, shape of the defect and the proximity to free margins an V-Y advancement flap was deemed most appropriate.  Using a sterile surgical marker, an appropriate advancement flap was drawn incorporating the defect and placing the expected incisions within the relaxed skin tension lines where possible.    The area thus outlined was incised deep to adipose tissue with a #15 scalpel blade.  The skin margins were undermined to an appropriate distance in all directions utilizing iris scissors.

## 2025-05-19 NOTE — ED NOTES
Iv to right forearm unsuccessful. Site flushed and vero back blood but it was slightly swollen and so this writer opted to dc'd it and restart a site. Pt tasted the flush but still opted to dc it.

## 2025-05-20 VITALS
OXYGEN SATURATION: 96 % | SYSTOLIC BLOOD PRESSURE: 132 MMHG | DIASTOLIC BLOOD PRESSURE: 89 MMHG | TEMPERATURE: 98.6 F | RESPIRATION RATE: 20 BRPM | HEART RATE: 90 BPM

## 2025-05-20 PROCEDURE — 6360000002 HC RX W HCPCS

## 2025-05-20 PROCEDURE — 96376 TX/PRO/DX INJ SAME DRUG ADON: CPT

## 2025-05-20 PROCEDURE — 6370000000 HC RX 637 (ALT 250 FOR IP)

## 2025-05-20 RX ORDER — DICYCLOMINE HYDROCHLORIDE 10 MG/1
10 CAPSULE ORAL ONCE
Status: COMPLETED | OUTPATIENT
Start: 2025-05-20 | End: 2025-05-20

## 2025-05-20 RX ORDER — FAMOTIDINE 20 MG/1
20 TABLET, FILM COATED ORAL 2 TIMES DAILY
Qty: 30 TABLET | Refills: 0 | Status: SHIPPED | OUTPATIENT
Start: 2025-05-20 | End: 2025-06-04

## 2025-05-20 RX ORDER — ONDANSETRON 4 MG/1
4 TABLET, ORALLY DISINTEGRATING ORAL 3 TIMES DAILY PRN
Qty: 21 TABLET | Refills: 0 | Status: SHIPPED | OUTPATIENT
Start: 2025-05-20

## 2025-05-20 RX ORDER — PROMETHAZINE HYDROCHLORIDE 25 MG/1
25 TABLET ORAL 4 TIMES DAILY PRN
Qty: 20 TABLET | Refills: 0 | Status: SHIPPED | OUTPATIENT
Start: 2025-05-20 | End: 2025-05-27

## 2025-05-20 RX ORDER — DICYCLOMINE HYDROCHLORIDE 10 MG/1
10 CAPSULE ORAL
Qty: 80 CAPSULE | Refills: 0 | Status: SHIPPED | OUTPATIENT
Start: 2025-05-20 | End: 2025-06-09

## 2025-05-20 RX ORDER — ONDANSETRON 2 MG/ML
4 INJECTION INTRAMUSCULAR; INTRAVENOUS ONCE
Status: COMPLETED | OUTPATIENT
Start: 2025-05-20 | End: 2025-05-20

## 2025-05-20 RX ADMIN — AMOXICILLIN AND CLAVULANATE POTASSIUM 1 TABLET: 875; 125 TABLET, FILM COATED ORAL at 01:02

## 2025-05-20 RX ADMIN — DICYCLOMINE HYDROCHLORIDE 10 MG: 10 CAPSULE ORAL at 01:02

## 2025-05-20 RX ADMIN — ONDANSETRON 4 MG: 2 INJECTION, SOLUTION INTRAMUSCULAR; INTRAVENOUS at 01:02

## 2025-05-20 ASSESSMENT — ENCOUNTER SYMPTOMS
ABDOMINAL PAIN: 1
DIARRHEA: 1
NAUSEA: 1
BLOOD IN STOOL: 0
VOMITING: 1
SHORTNESS OF BREATH: 0

## 2025-05-20 NOTE — CONSULTS
Department of Neurosurgery                                       Resident Consult Note      Reason for Consult: Comfort/early satiety postop  Requesting Physician: aYnely  Neurosurgeon:   [x]Dr. Alfredo    History Obtained From:  patient, electronic medical record, ED team    CHIEF COMPLAINT:         Abdominal discomfort/early satiety postop    HISTORY OF PRESENT ILLNESS:       The patient is a 54 y.o. male status post anterior cervical discectomy and fusion and lumbar laminectomy on 5/9 with Dr. Wren who presents with concerns of abdominal discomfort that worsens throughout the day with eating.  Patient reports this started shortly after the procedure.  Passing daily stools, flatus.  Does have vomiting in the afternoon, bright yellow.  Patient also reports no mechanical difficulty swallowing however does not feel like he can swallow secondary to feeling full.  Denies a feeling of food getting stuck.  Does have a history of GERD on Prilosec.  Patient reports that he has not been requiring oxycodone, denies neck or back pain related to the surgery.  Does have intermittent numbness of the left hand similar to prior to the procedure.    PAST MEDICAL HISTORY :       Past Medical History:        Diagnosis Date    Arthritis     Right knee    Cauda equina syndrome (HCC)     Elevated hemoglobin 04/27/2022    Elevated hemoglobin     per patient    Hyperlipidemia     Hypertension     Prediabetes     Wellness examination     Dr. SANDIP Clemons, PCP       Past Surgical History:        Procedure Laterality Date    ANTERIOR CERVICAL DISCECTOMY N/A 05/09/2025    ANTERIOR CERVICAL DISCECTOMY FUSION C4-C6  *1ST PROCEDURE* (DEPUY, MICROSCOPE, C-ARM)  General Panel 2 Neurosurgery L4-L5 LAMINECTOMY WITH CEREBRAL SPINAL FLUID LEAK REPAIR (MICROSCOPE, C-ARM)  General    CERVICAL FUSION N/A 5/9/2025    ANTERIOR CERVICAL DISCECTOMY FUSION C4-C6  *1ST PROCEDURE* (DEPUY, MICROSCOPE, C-ARM) performed by Rip Wren DO at

## 2025-05-20 NOTE — ED PROVIDER NOTES
[facility] EMERGENCY DEPARTMENT  Emergency Department Encounter  Emergency Medicine Resident     Pt Name:Christiano Wade  MRN: [id number]  Birthdate 1970  Date of evaluation: 5/20/25  PCP:  Vic Clemons MD  Note Started: 12:18 AM EDT      CHIEF COMPLAINT       Chief Complaint   Patient presents with    Abdominal Pain       HISTORY OF PRESENT ILLNESS  (Location/Symptom, Timing/Onset, Context/Setting, Quality, Duration, Modifying Factors, Severity.)      Christiano Wade is a 54 y.o. male with PMHx of HTN, HLD who presents with nausea, vomiting, upper abdominal pain that has been ongoing for the last 10 days and gradually worsening.  He reports that he feels as though he has a hard time swallowing and once he consumes the food, he has episodes of bile emesis approximately 2 hours later.  He also reports foul-smelling diarrhea. Patient reports that he did recently have surgery and had a cervical discectomy as well as a laminectomy in the lumbar region.  He reports that he has been having difficulty keeping food down since this time.  He denies any known complications with the surgery other than a small CSF leak that improved with conservative management.  He denies any prior abdominal surgeries.  He has been using simethicone at home without any significant improvement.  He denies chest pain or shortness of breath.  He has never had similar symptoms in the past.    PAST MEDICAL / SURGICAL / SOCIAL / FAMILY HISTORY      has a past medical history of Arthritis, Cauda equina syndrome (HCC), Elevated hemoglobin, Elevated hemoglobin, Hyperlipidemia, Hypertension, Prediabetes, and Wellness examination.       has a past surgical history that includes knee surgery (Right); Badger tooth extraction (Bilateral); Knee arthroscopy (Right, 06/19/2023); Anterior cervical discectomy (N/A, 05/09/2025); cervical fusion (N/A, 5/9/2025); and laminectomy (N/A, 5/9/2025).      Social History     Socioeconomic History

## 2025-05-20 NOTE — ED NOTES
Patient resting on stretcher, NAD  RR even and non-labored  Bed locked and in lowest position  Call light within reach  No needs expressed at this time  Family at bedside

## 2025-05-20 NOTE — ED PROVIDER NOTES
Note Started: 11:42 PM EDT         [facility]     Emergency Department     Faculty Attestation    I performed a history and physical examination of the patient and discussed management with the resident. I reviewed the resident’s note and agree with the documented findings and plan of care. Any areas of disagreement are noted on the chart. I was personally present for the key portions of any procedures. I have documented in the chart those procedures where I was not present during the key portions. I have reviewed the emergency nurses triage note. I agree with the chief complaint, past medical history, past surgical history, allergies, medications, social and family history as documented unless otherwise noted below.        For Physician Assistant/ Nurse Practitioner cases/documentation I have personally evaluated this patient and have completed at least one if not all key elements of the E/M (history, physical exam, and MDM). Additional findings are as noted.  I have personally seen and evaluated the patient.  I find the patient's history and physical exam are consistent with the NP/PA documentation.  I agree with the care provided, treatment rendered, disposition and follow-up plan.    54-year-old male with recent cervical and lumbar fusion presenting with difficulty swallowing, nausea, and vomiting intermittently since surgery.  Also reports 20 pound weight loss in the last 2 weeks.  Foul-smelling diarrhea, wife describes emesis as limegreen and yellow, also with a foul smell.  Patient states that things taste metallic.  He is concerned that he may have a vagal nerve injury from the surgery.    Exam:  General : Laying on the bed, awake, alert, and in no acute distress  CV : normal rate and regular rhythm  Lungs : Breathing comfortably on room air with no tachypnea, hypoxia, or increased work of breathing    DDx: Pancreatitis, biliary colic.  Less concern regarding vagal nerve injury as

## 2025-05-20 NOTE — ED PROVIDER NOTES
[facility] EMERGENCY DEPARTMENT  Emergency Department  Faculty Sign-Out Addendum     Care of Christiano Wade was assumed from previous attending and is being seen for Abdominal Pain  .  The patient's initial evaluation and plan have been discussed with the prior provider who initially evaluated the patient.      I reviewed the resident’s note and agree with the documented findings and plan of care. Any areas of disagreement are noted on the chart. I was personally present for the key portions of any procedures. I have documented in the chart those procedures where I was not present during the key portions. I have reviewed the emergency nurses triage note. I agree with the chief complaint, past medical history, past surgical history, allergies, medications, social and family history as documented unless otherwise noted below.      EMERGENCY DEPARTMENT COURSE / MEDICAL DECISION MAKING:       MEDICATIONS GIVEN:  Orders Placed This Encounter   Medications    sodium chloride 0.9 % bolus 1,000 mL    ondansetron (ZOFRAN) injection 4 mg    famotidine (PEPCID) 20 MG/2ML 20 mg in sodium chloride (PF) 0.9 % 10 mL injection    dicyclomine (BENTYL) injection 20 mg    promethazine (PHENERGAN) injection 12.5 mg    iopamidol (ISOVUE-370) 76 % injection 75 mL       LABS / RADIOLOGY:     Labs Reviewed   CBC WITH AUTO DIFFERENTIAL - Abnormal; Notable for the following components:       Result Value    WBC 14.6 (*)     MCHC 35.6 (*)     RDW 11.6 (*)     Platelet, Fluorescence 515 (*)     Neutrophils % 77 (*)     Lymphocytes % 16 (*)     Immature Granulocytes % 1 (*)     Neutrophils Absolute 11.17 (*)     All other components within normal limits   BASIC METABOLIC PANEL - Abnormal; Notable for the following components:    Sodium 134 (*)     Chloride 96 (*)     Glucose 140 (*)     All other components within normal limits   HEPATIC FUNCTION PANEL - Abnormal; Notable for the following components:     (*)     All other

## 2025-05-20 NOTE — ED NOTES
Patient resting on stretcher, NAD  RR even and non-labored  Bed locked and in lowest position  Call light within reach  Family at bedside

## 2025-05-20 NOTE — DISCHARGE INSTRUCTIONS
Thank you for visiting [facility] Emergency Department.    Please return to the emergency department if your symptoms are not better within 2 days.  At this time, you have no signs of bowel obstruction but if your pain severely worsens or you stop passing gas or having bowel movements at all, please return.    Your gallbladder was normal.  Your pancreas level was slightly elevated which is likely from inflammation.  You do have a gastroenteritis noted on your CT scan.  You have no signs of bowel obstruction.  You were given antibiotics due to the length of your symptoms and the fact that you recently had a surgery.  Please take the Augmentin until completed.  You are also prescribed Bentyl for abdominal cramping.  You can also take the Pepcid twice a day for acid reflux related to vomiting.  Take the Zofran if you feel nauseous.  If you still feel nauseous, you can take the Phenergan after that as well.    You need to call Vic Clemons MD to make an appointment as directed for follow up.    Should you have any questions regarding your care or further treatment, please call [facility] Emergency Department at [phone].

## 2025-05-23 ENCOUNTER — OFFICE VISIT (OUTPATIENT)
Dept: NEUROSURGERY | Age: 55
End: 2025-05-23

## 2025-05-23 VITALS
TEMPERATURE: 97 F | BODY MASS INDEX: 36.53 KG/M2 | OXYGEN SATURATION: 97 % | HEIGHT: 68 IN | SYSTOLIC BLOOD PRESSURE: 107 MMHG | HEART RATE: 104 BPM | WEIGHT: 241 LBS | DIASTOLIC BLOOD PRESSURE: 74 MMHG

## 2025-05-23 DIAGNOSIS — G83.4 CAUDA EQUINA SYNDROME (HCC): ICD-10-CM

## 2025-05-23 DIAGNOSIS — M48.062 LUMBAR STENOSIS WITH NEUROGENIC CLAUDICATION: ICD-10-CM

## 2025-05-23 DIAGNOSIS — M48.02 STENOSIS OF CERVICAL SPINE WITH MYELOPATHY (HCC): Primary | ICD-10-CM

## 2025-05-23 DIAGNOSIS — Z98.890 S/P LAMINECTOMY: ICD-10-CM

## 2025-05-23 DIAGNOSIS — G99.2 STENOSIS OF CERVICAL SPINE WITH MYELOPATHY (HCC): Primary | ICD-10-CM

## 2025-05-23 DIAGNOSIS — Z98.1 S/P CERVICAL SPINAL FUSION: ICD-10-CM

## 2025-05-23 PROCEDURE — 99024 POSTOP FOLLOW-UP VISIT: CPT | Performed by: NURSE PRACTITIONER

## 2025-05-23 RX ORDER — CYCLOBENZAPRINE HCL 10 MG
10 TABLET ORAL 3 TIMES DAILY PRN
Qty: 30 TABLET | Refills: 0 | Status: SHIPPED | OUTPATIENT
Start: 2025-05-23 | End: 2025-06-02

## 2025-05-23 NOTE — PROGRESS NOTES
[facility] NEUROSURGERY [facility]  [address]  [address]  [address], [address]  [facility] [address]  Dept: [phone]    Patient:  Christiano Wade  YOB: 1970  Date: 5/23/25    The patient is a 54 y.o. male who presents today for consult of the following problems:     Chief Complaint   Patient presents with    Post-Op Check     2 Week Post Op Check          HPI:     Christiano Wade is a 54 y.o. male who presents to the office for post-op evaluation s/p ACDF C4-6, L4-L5 laminectomy with repair of CSF leak.  Initially upon discharge, patient did have some significant gastrointestinal issues, was struggling with decreased appetite, vomiting, diarrhea.  Ultimately was evaluated in the emergency department, treated with antiemetics and antibiotics for gastroenteritis.  Reports that symptoms are improving.  He is now eating.  No longer vomiting.  Had a soft stool today.  Pain has been very well-controlled, has not taken any pain medications for more than a week.  Only utilizing muscle relaxer which is effectively controlling his postop pain.  Has been compliant with cervical collar.  No issues swallowing.  Still with some diminished sensation to left hand, otherwise denies numbness or tingling.  Incisions healing well, denies any discharge, drainage, fevers or chills.    Strength 5/5  Sensation intact with exception of left hand  Incisions CDI, no erythema, drainage present    Date of surgery: 5/9/2025    Assessment and Plan:     1. Stenosis of cervical spine with myelopathy (HCC)    2. Lumbar stenosis with neurogenic claudication    3. Cauda equina syndrome (HCC)    4. S/P cervical spinal fusion    5. S/P laminectomy         Plan: Overall patient doing well.  Continue cervical collar when out of bed, okay to remove to eat, sleep and for hygiene.  Refill muscle relaxer sent to pharmacy.  Referral provided for postop therapy, no direct

## 2025-05-27 ENCOUNTER — HOSPITAL ENCOUNTER (OUTPATIENT)
Dept: PHYSICAL THERAPY | Facility: CLINIC | Age: 55
Setting detail: THERAPIES SERIES
Discharge: HOME | End: 2025-05-27
Payer: COMMERCIAL

## 2025-05-27 PROCEDURE — 97161 PT EVAL LOW COMPLEX 20 MIN: CPT

## 2025-05-27 PROCEDURE — 97110 THERAPEUTIC EXERCISES: CPT

## 2025-05-27 NOTE — CONSULTS
[] [facility]  Outpatient Rehabilitation &  Therapy  [address]  [phone]  [phone] [] [facility]  Outpatient Rehabilitation &  Therapy  [address]  P: [phone]  F: [phone] [x] [facility]  Outpatient Rehabilitation &  Therapy  [address]  P: [phone]  F: [phone] [] [facility]  Outpatient Rehabilitation &  Therapy  [address]  [phone]  [phone] [] [facility]  Outpatient Rehabilitation &  Therapy  [address]   P: [phone]  F: [phone]  [] [facility]  Outpatient Rehabilitation &  Therapy  [address]  P: [phone]  F: [phone] [] [facility]  Outpatient Rehabilitation &  Therapy  [address]  P: [phone]  F: [phone] [] [facility]  Outpatient Rehabilitation &  Therapy  [address]  P: [phone]  F: [phone] [] [facility]  Outpatient Rehabilitation &  Therapy  [address]  P: [phone]  F: [phone]  [] [facility] Outpatient Rehabilitation &  Therapy  [address]  P: [phone]  F: [phone]     Physical Therapy Spine Evaluation    Date:  2025  Patient: Christiano Wade  : 1970  MRN: [id number]  Physician: Monae Cowan APRN - CNP  Insurance: [employer] OPEN ACCESS PLUS ( 60 visit hard max no auth required)  Medical Diagnosis:   Diagnosis   M48.02, G99.2 (ICD-10-CM) - Stenosis of cervical spine with myelopathy (HCC)   M48.062 (ICD-10-CM) - Lumbar stenosis with neurogenic claudication   G83.4 (ICD-10-CM) - Cauda equina syndrome (HCC)   Z98.1 (ICD-10-CM) - S/P cervical spinal fusion   Z98.890 (ICD-10-CM) - S/P laminectomy   Rehab Codes: M54.5, M62.81  Onset Date: 25    Next 's appt.: 25    Subjective:   CC:Christiano SANTOS

## 2025-06-02 ENCOUNTER — HOSPITAL ENCOUNTER (OUTPATIENT)
Dept: PHYSICAL THERAPY | Facility: CLINIC | Age: 55
Setting detail: THERAPIES SERIES
Discharge: HOME | End: 2025-06-02
Payer: COMMERCIAL

## 2025-06-02 PROCEDURE — 97110 THERAPEUTIC EXERCISES: CPT

## 2025-06-02 NOTE — FLOWSHEET NOTE
[] [facility]  Outpatient Rehabilitation &  Therapy  [address]  [phone]  [phone] [] [facility]  Outpatient Rehabilitation &  Therapy  [address]  P: [phone]  F: [phone] [] [facility]  Outpatient Rehabilitation &  Therapy  [address]  P: [phone]  F: [phone] [] [facility]  Outpatient Rehabilitation &  Therapy  [address]  [phone]  [phone] [] [facility]  Outpatient Rehabilitation &  Therapy  [address]   P: [phone]  F: [phone]  [] [facility]  Outpatient Rehabilitation &  Therapy  [address]  P: [phone]  F: [phone] [] [facility]  Outpatient Rehabilitation &  Therapy  [address]  P: [phone]  F: [phone] [] [facility]  Outpatient Rehabilitation &  Therapy  [address]  P: [phone]  F: [phone] [] [facility]  Outpatient Rehabilitation &  Therapy  [address]  P: [phone]  F: [phone]  [] [facility] Outpatient Rehabilitation &  Therapy  [address]  P: [phone]  F: [phone]     Physical Therapy Daily Treatment Note    Date:  2025  Patient Name:  Christiano Wade    :  1970  MRN: [id number]  Physician: Monae Cowan APRN - CNP  Insurance: [employer] OPEN ACCESS PLUS ( 60 visit hard max no auth required)  Medical Diagnosis:   Diagnosis   M48.02, G99.2 (ICD-10-CM) - Stenosis of cervical spine with myelopathy (HCC)   M48.062 (ICD-10-CM) - Lumbar stenosis with neurogenic claudication   G83.4 (ICD-10-CM) - Cauda equina syndrome (HCC)   Z98.1 (ICD-10-CM) - S/P cervical spinal fusion   Z98.890 (ICD-10-CM) - S/P laminectomy   Rehab Codes: M54.5, M62.81  Onset Date: 25                   Next 's appt.:

## 2025-06-05 ENCOUNTER — HOSPITAL ENCOUNTER (OUTPATIENT)
Dept: PHYSICAL THERAPY | Facility: CLINIC | Age: 55
Setting detail: THERAPIES SERIES
Discharge: HOME | End: 2025-06-05
Payer: COMMERCIAL

## 2025-06-05 PROCEDURE — 97110 THERAPEUTIC EXERCISES: CPT

## 2025-06-05 NOTE — FLOWSHEET NOTE
pain LB to allow pt to stand and walk x30 min  ? ROM: ensure hip extension is 10 deg or better to allow for greater ease standing tall and correcting pelvic position to allo for good posture  ? Strength: 5/5 shoulder and hip strength for greater ease with ADLs  ? Function:Pt to be able to walk with normal posture no forward flexion and neutral pelvis, normal sitting posture sitting on sit bones not tailbone to allow for normal head and neck posture  Patient to be independent with home exercise program as demonstrated by performance with correct form without cues.     LTG: (to be met in 20 treatments)  0/10 pain back to allow pt to return to elliptical, walking and LE workouts   Pt able to wean from collar at 10 weeks and be able to demonstrate normal posture         Patient goals:regain strength in L arm and L LE and be able to get back to work and riding my [employer]    Pt. Education:  [x] Yes  [] No  [x] Reviewed Prior HEP/Ed  Method of Education: [x] Verbal  [x] Demo  [] Written  Comprehension of Education:  [x] Verbalizes understanding.  [x] Demonstrates understanding.  [] Needs review.  [] Demonstrates/verbalizes HEP/Ed previously given.    Access Code: [email or web address]  URL: [email or web address]  Date: 05/27/2025  Prepared by: Yael Vang     Exercises  - Standing Scapular Retraction  - 2 x daily - 7 x weekly - 3 sets - 10 reps  - Seated Gripping Towel  - 2 x daily - 7 x weekly - 3 sets - 10 reps  - Resisted Finger Extension and Thumb Abduction  - 2 x daily - 7 x weekly - 3 sets - 10 reps  - Seated Long Arc Quad  - 2 x daily - 7 x weekly - 2 sets - 10 reps     Plan: [x] Continue current frequency toward long and short term goals.    [x] Specific Instructions for subsequent treatments: progress as brooks      Time In: 2:00 pm            Time Out: 2:50 pm    Electronically signed by:  Amrik Schwarz PTA

## 2025-06-09 ENCOUNTER — HOSPITAL ENCOUNTER (OUTPATIENT)
Dept: PHYSICAL THERAPY | Facility: CLINIC | Age: 55
Setting detail: THERAPIES SERIES
Discharge: HOME | End: 2025-06-09
Payer: COMMERCIAL

## 2025-06-09 PROCEDURE — 97110 THERAPEUTIC EXERCISES: CPT

## 2025-06-09 NOTE — FLOWSHEET NOTE
greater ease standing tall and correcting pelvic position to allo for good posture  ? Strength: 5/5 shoulder and hip strength for greater ease with ADLs  ? Function:Pt to be able to walk with normal posture no forward flexion and neutral pelvis, normal sitting posture sitting on sit bones not tailbone to allow for normal head and neck posture  Patient to be independent with home exercise program as demonstrated by performance with correct form without cues.     LTG: (to be met in 20 treatments)  0/10 pain back to allow pt to return to elliptical, walking and LE workouts   Pt able to wean from collar at 10 weeks and be able to demonstrate normal posture         Patient goals:regain strength in L arm and L LE and be able to get back to work and riding my [employer]    Pt. Education:  [x] Yes  [] No  [x] Reviewed Prior HEP/Ed  Method of Education: [x] Verbal  [x] Demo  [] Written  Comprehension of Education:  [x] Verbalizes understanding.  [x] Demonstrates understanding.  [] Needs review.  [] Demonstrates/verbalizes HEP/Ed previously given.    Access Code: [email or web address]  URL: [email or web address]  Date: 05/27/2025  Prepared by: Yael Vang     Exercises  - Standing Scapular Retraction  - 2 x daily - 7 x weekly - 3 sets - 10 reps  - Seated Gripping Towel  - 2 x daily - 7 x weekly - 3 sets - 10 reps  - Resisted Finger Extension and Thumb Abduction  - 2 x daily - 7 x weekly - 3 sets - 10 reps  - Seated Long Arc Quad  - 2 x daily - 7 x weekly - 2 sets - 10 reps     Plan: [x] Continue current frequency toward long and short term goals.    [x] Specific Instructions for subsequent treatments: progress as brooks      Time In: 2:00 pm            Time Out: 2:50 pm    Electronically signed by:  Amrik Schwarz PTA

## 2025-06-11 ENCOUNTER — HOSPITAL ENCOUNTER (OUTPATIENT)
Dept: PHYSICAL THERAPY | Facility: CLINIC | Age: 55
Setting detail: THERAPIES SERIES
Discharge: HOME | End: 2025-06-11
Payer: COMMERCIAL

## 2025-06-11 PROCEDURE — 97110 THERAPEUTIC EXERCISES: CPT

## 2025-06-11 NOTE — FLOWSHEET NOTE
[] [facility]  Outpatient Rehabilitation &  Therapy  [address]  [phone]  [phone] [] [facility]  Outpatient Rehabilitation &  Therapy  [address]  P: [phone]  F: [phone] [x] [facility]  Outpatient Rehabilitation &  Therapy  [address]  P: [phone]  F: [phone] [] [facility]  Outpatient Rehabilitation &  Therapy  [address]  [phone]  [phone] [] [facility]  Outpatient Rehabilitation &  Therapy  [address]   P: [phone]  F: [phone]  [] [facility]  Outpatient Rehabilitation &  Therapy  [address]  P: [phone]  F: [phone] [] [facility]  Outpatient Rehabilitation &  Therapy  [address]  P: [phone]  F: [phone] [] [facility]  Outpatient Rehabilitation &  Therapy  [address]  P: [phone]  F: [phone] [] [facility]  Outpatient Rehabilitation &  Therapy  [address]  P: [phone]  F: [phone]  [] [facility] Outpatient Rehabilitation &  Therapy  [address]  P: [phone]  F: [phone]     Physical Therapy Daily Treatment Note    Date:  2025  Patient Name:  Christiano Wade    :  1970  MRN: [id number]  Physician: Monae Cowan APRN - CNP  Insurance: CIG[employer] OPEN ACCESS PLUS ( 60 visit hard max no auth required)  Medical Diagnosis:   Diagnosis   M48.02, G99.2 (ICD-10-CM) - Stenosis of cervical spine with myelopathy (HCC)   M48.062 (ICD-10-CM) - Lumbar stenosis with neurogenic claudication   G83.4 (ICD-10-CM) - Cauda equina syndrome (HCC)   Z98.1 (ICD-10-CM) - S/P cervical spinal fusion   Z98.890 (ICD-10-CM) - S/P laminectomy   Rehab Codes: M54.5, M62.81  Onset Date: 25                   Next 's appt.:

## 2025-06-12 ENCOUNTER — APPOINTMENT (OUTPATIENT)
Dept: PHYSICAL THERAPY | Facility: CLINIC | Age: 55
End: 2025-06-12
Payer: COMMERCIAL

## 2025-06-16 ENCOUNTER — HOSPITAL ENCOUNTER (OUTPATIENT)
Dept: PHYSICAL THERAPY | Facility: CLINIC | Age: 55
Setting detail: THERAPIES SERIES
Discharge: HOME | End: 2025-06-16
Payer: COMMERCIAL

## 2025-06-16 PROCEDURE — 97110 THERAPEUTIC EXERCISES: CPT

## 2025-06-16 NOTE — FLOWSHEET NOTE
[] [facility]  Outpatient Rehabilitation &  Therapy  [address]  [phone]  [phone] [] [facility]  Outpatient Rehabilitation &  Therapy  [address]  P: [phone]  F: [phone] [x] [facility]  Outpatient Rehabilitation &  Therapy  [address]  P: [phone]  F: [phone] [] [facility]  Outpatient Rehabilitation &  Therapy  [address]  [phone]  [phone] [] [facility]  Outpatient Rehabilitation &  Therapy  [address]   P: [phone]  F: [phone]  [] [facility]  Outpatient Rehabilitation &  Therapy  [address]  P: [phone]  F: [phone] [] [facility]  Outpatient Rehabilitation &  Therapy  [address]  P: [phone]  F: [phone] [] [facility]  Outpatient Rehabilitation &  Therapy  [address]  P: [phone]  F: [phone] [] [facility]  Outpatient Rehabilitation &  Therapy  [address]  P: [phone]  F: [phone]  [] [facility] Outpatient Rehabilitation &  Therapy  [address]  P: [phone]  F: [phone]     Physical Therapy Daily Treatment Note    Date:  2025  Patient Name:  Christiano Wade    :  1970  MRN: [id number]  Physician: Monae Cowan APRN - CNP  Insurance: CIG[employer] OPEN ACCESS PLUS ( 60 visit hard max no auth required)  Medical Diagnosis:   Diagnosis   M48.02, G99.2 (ICD-10-CM) - Stenosis of cervical spine with myelopathy (HCC)   M48.062 (ICD-10-CM) - Lumbar stenosis with neurogenic claudication   G83.4 (ICD-10-CM) - Cauda equina syndrome (HCC)   Z98.1 (ICD-10-CM) - S/P cervical spinal fusion   Z98.890 (ICD-10-CM) - S/P laminectomy   Rehab Codes: M54.5, M62.81  Onset Date: 25                   Next 's appt.:

## 2025-06-19 ENCOUNTER — HOSPITAL ENCOUNTER (OUTPATIENT)
Dept: PHYSICAL THERAPY | Facility: CLINIC | Age: 55
Setting detail: THERAPIES SERIES
Discharge: HOME | End: 2025-06-19
Payer: COMMERCIAL

## 2025-06-19 PROCEDURE — 97110 THERAPEUTIC EXERCISES: CPT

## 2025-06-19 NOTE — FLOWSHEET NOTE
[] [facility]  Outpatient Rehabilitation &  Therapy  [address]  [phone]  [phone] [] [facility]  Outpatient Rehabilitation &  Therapy  [address]  P: [phone]  F: [phone] [x] [facility]  Outpatient Rehabilitation &  Therapy  [address]  P: [phone]  F: [phone] [] [facility]  Outpatient Rehabilitation &  Therapy  [address]  [phone]  [phone] [] [facility]  Outpatient Rehabilitation &  Therapy  [address]   P: [phone]  F: [phone]  [] [facility]  Outpatient Rehabilitation &  Therapy  [address]  P: [phone]  F: [phone] [] [facility]  Outpatient Rehabilitation &  Therapy  [address]  P: [phone]  F: [phone] [] [facility]  Outpatient Rehabilitation &  Therapy  [address]  P: [phone]  F: [phone] [] [facility]  Outpatient Rehabilitation &  Therapy  [address]  P: [phone]  F: [phone]  [] [facility] Outpatient Rehabilitation &  Therapy  [address]  P: [phone]  F: [phone]     Physical Therapy Daily Treatment Note    Date:  2025  Patient Name:  Christiano Wade    :  1970  MRN: [id number]  Physician: Monae Cowan APRN - CNP  Insurance: CIGNA OPEN ACCESS PLUS ( 60 visit hard max no auth required)  Medical Diagnosis:   Diagnosis   M48.02, G99.2 (ICD-10-CM) - Stenosis of cervical spine with myelopathy (HCC)   M48.062 (ICD-10-CM) - Lumbar stenosis with neurogenic claudication   G83.4 (ICD-10-CM) - Cauda equina syndrome (HCC)   Z98.1 (ICD-10-CM) - S/P cervical spinal fusion   Z98.890 (ICD-10-CM) - S/P laminectomy   Rehab Codes: M54.5, M62.81  Onset Date: 25                   Next 's appt.:

## 2025-06-23 ENCOUNTER — HOSPITAL ENCOUNTER (OUTPATIENT)
Dept: GENERAL RADIOLOGY | Age: 55
Discharge: HOME | End: 2025-06-25
Payer: COMMERCIAL

## 2025-06-23 ENCOUNTER — HOSPITAL ENCOUNTER (OUTPATIENT)
Dept: PHYSICAL THERAPY | Facility: CLINIC | Age: 55
Setting detail: THERAPIES SERIES
Discharge: HOME | End: 2025-06-23
Payer: COMMERCIAL

## 2025-06-23 ENCOUNTER — HOSPITAL ENCOUNTER (OUTPATIENT)
Age: 55
Discharge: HOME | End: 2025-06-25
Payer: COMMERCIAL

## 2025-06-23 DIAGNOSIS — Z98.1 S/P CERVICAL SPINAL FUSION: ICD-10-CM

## 2025-06-23 DIAGNOSIS — G99.2 STENOSIS OF CERVICAL SPINE WITH MYELOPATHY (HCC): ICD-10-CM

## 2025-06-23 DIAGNOSIS — M48.02 STENOSIS OF CERVICAL SPINE WITH MYELOPATHY (HCC): ICD-10-CM

## 2025-06-23 PROCEDURE — 72040 X-RAY EXAM NECK SPINE 2-3 VW: CPT

## 2025-06-23 PROCEDURE — 97110 THERAPEUTIC EXERCISES: CPT

## 2025-06-23 NOTE — PROGRESS NOTES
[] [facility]  Outpatient Rehabilitation &  Therapy  [address]  [phone]  [phone] [] [facility]  Outpatient Rehabilitation &  Therapy  [address]  P: [phone]  F: [phone] [x] [facility]  Outpatient Rehabilitation &  Therapy  [address]  P: [phone]  F: [phone] [] [facility]  Outpatient Rehabilitation &  Therapy  [address]  [phone]  [phone] [] [facility]  Outpatient Rehabilitation &  Therapy  [address]   P: [phone]  F: [phone]  [] [facility]  Outpatient Rehabilitation &  Therapy  [address]  P: [phone]  F: [phone] [] [facility]  Outpatient Rehabilitation &  Therapy  [address]  P: [phone]  F: [phone] [] [facility]  Outpatient Rehabilitation &  Therapy  [address]  P: [phone]  F: [phone] [] [facility]  Outpatient Rehabilitation &  Therapy  [address]  P: [phone]  F: [phone]  [] [facility] Outpatient Rehabilitation &  Therapy  [address]  P: [phone]  F: [phone]     Physical Therapy Progress Note    Date: 2025      Patient: Christiano Wade  : 1970  MRN: [id number]    Physician: Monae Cowan APRN - CNP  Insurance: CIGNA OPEN ACCESS PLUS ( 60 visit hard max no auth required)  Medical Diagnosis:   Diagnosis   M48.02, G99.2 (ICD-10-CM) - Stenosis of cervical spine with myelopathy (HCC)   M48.062 (ICD-10-CM) - Lumbar stenosis with neurogenic claudication   G83.4 (ICD-10-CM) - Cauda equina syndrome (HCC)   Z98.1 (ICD-10-CM) - S/P cervical spinal fusion   Z98.890 (ICD-10-CM) - S/P laminectomy   Rehab Codes: M54.5, M62.81  Onset Date: 25                   Next 's appt.: 25  Visit# /

## 2025-06-23 NOTE — FLOWSHEET NOTE
[] [facility]  Outpatient Rehabilitation &  Therapy  [address]  [phone]  [phone] [] [facility]  Outpatient Rehabilitation &  Therapy  [address]  P: [phone]  F: [phone] [x] [facility]  Outpatient Rehabilitation &  Therapy  [address]  P: [phone]  F: [phone] [] [facility]  Outpatient Rehabilitation &  Therapy  [address]  [phone]  [phone] [] [facility]  Outpatient Rehabilitation &  Therapy  [address]   P: [phone]  F: [phone]  [] [facility]  Outpatient Rehabilitation &  Therapy  [address]  P: [phone]  F: [phone] [] [facility]  Outpatient Rehabilitation &  Therapy  [address]  P: [phone]  F: [phone] [] [facility]  Outpatient Rehabilitation &  Therapy  [address]  P: [phone]  F: [phone] [] [facility]  Outpatient Rehabilitation &  Therapy  [address]  P: [phone]  F: [phone]  [] [facility] Outpatient Rehabilitation &  Therapy  [address]  P: [phone]  F: [phone]     Physical Therapy Daily Treatment Note    Date:  2025  Patient Name:  Christiano Wade    :  1970  MRN: [id number]  Physician: Monae Cowan APRN - CNP  Insurance: CIGNA OPEN ACCESS PLUS ( 60 visit hard max no auth required)  Medical Diagnosis:   Diagnosis   M48.02, G99.2 (ICD-10-CM) - Stenosis of cervical spine with myelopathy (HCC)   M48.062 (ICD-10-CM) - Lumbar stenosis with neurogenic claudication   G83.4 (ICD-10-CM) - Cauda equina syndrome (HCC)   Z98.1 (ICD-10-CM) - S/P cervical spinal fusion   Z98.890 (ICD-10-CM) - S/P laminectomy   Rehab Codes: M54.5, M62.81  Onset Date: 25                   Next 's appt.:

## 2025-06-25 ENCOUNTER — OFFICE VISIT (OUTPATIENT)
Dept: NEUROSURGERY | Age: 55
End: 2025-06-25

## 2025-06-25 VITALS
HEIGHT: 68 IN | WEIGHT: 241.2 LBS | DIASTOLIC BLOOD PRESSURE: 71 MMHG | TEMPERATURE: 98.4 F | BODY MASS INDEX: 36.56 KG/M2 | SYSTOLIC BLOOD PRESSURE: 113 MMHG | HEART RATE: 103 BPM

## 2025-06-25 DIAGNOSIS — M48.02 STENOSIS OF CERVICAL SPINE WITH MYELOPATHY (HCC): Primary | ICD-10-CM

## 2025-06-25 DIAGNOSIS — G99.2 STENOSIS OF CERVICAL SPINE WITH MYELOPATHY (HCC): Primary | ICD-10-CM

## 2025-06-25 PROCEDURE — 99024 POSTOP FOLLOW-UP VISIT: CPT | Performed by: NEUROLOGICAL SURGERY

## 2025-06-25 NOTE — PROGRESS NOTES
[facility] NEUROSURGERY [facility]  [address]  [address] - GROUND FLOOR, [address]  [facility] [address]  Dept: [phone]    Patient:  Christiano Wade  YOB: 1970  Date: 6/25/25    The patient is a 54 y.o. male who presents today for consult of the following problems:     Chief Complaint   Patient presents with    Post-Op Check             HPI:     Christiano Wade is a 54 y.o. male on whom neurosurgical consultation was requested by Vic Clemons MD for management of cervical stenosis with myelopathy. Doing very well with minimal numbness L palm. Minimal neck pain.      History:     Past Medical History:   Diagnosis Date    Arthritis     Right knee    Cauda equina syndrome (HCC)     Elevated hemoglobin 04/27/2022    Elevated hemoglobin     per patient    Hyperlipidemia     Hypertension     Prediabetes     Wellness examination     Dr. SANDIP Clemons, PCP     Past Surgical History:   Procedure Laterality Date    ANTERIOR CERVICAL DISCECTOMY N/A 05/09/2025    ANTERIOR CERVICAL DISCECTOMY FUSION C4-C6  *1ST PROCEDURE* (DEPUY, MICROSCOPE, C-ARM)  General Panel 2 Neurosurgery L4-L5 LAMINECTOMY WITH CEREBRAL SPINAL FLUID LEAK REPAIR (MICROSCOPE, C-ARM)  General    CERVICAL FUSION N/A 5/9/2025    ANTERIOR CERVICAL DISCECTOMY FUSION C4-C6  *1ST PROCEDURE* (DEPUY, MICROSCOPE, C-ARM) performed by Rip Wren DO at [facility] OR    KNEE ARTHROSCOPY Right 06/19/2023    KNEE ARTHROSCOPY PARTIAL MEDIAL MENISCECTOMY and excision of osteochondral loose bodies performed by Jona Ingram MD at [facility] OR    KNEE SURGERY Right     LAMINECTOMY N/A 5/9/2025    L4-L5 LAMINECTOMY WITH CEREBRAL SPINAL FLUID LEAK REPAIR (MICROSCOPE, C-ARM) performed by Rip Wren DO at [facility] OR    WISDOM TOOTH EXTRACTION Bilateral      Family History   Problem Relation Age of Onset    Stroke Mother     Diabetes Mother     Cancer Mother     Mult Sclerosis Mother

## 2025-06-26 ENCOUNTER — HOSPITAL ENCOUNTER (OUTPATIENT)
Dept: PHYSICAL THERAPY | Facility: CLINIC | Age: 55
Setting detail: THERAPIES SERIES
Discharge: HOME | End: 2025-06-26
Payer: COMMERCIAL

## 2025-06-26 PROCEDURE — 97110 THERAPEUTIC EXERCISES: CPT

## 2025-06-26 NOTE — FLOWSHEET NOTE
[] [facility]  Outpatient Rehabilitation &  Therapy  [address]  [phone]  [phone] [] [facility]  Outpatient Rehabilitation &  Therapy  [address]  P: [phone]  F: [phone] [x] [facility]  Outpatient Rehabilitation &  Therapy  [address]  P: [phone]  F: [phone] [] [facility]  Outpatient Rehabilitation &  Therapy  [address]  [phone]  [phone] [] [place]  Outpatient Rehabilitation &  Therapy  [address]   P: [phone]  F: [phone]  [] [facility]  Outpatient Rehabilitation &  Therapy  [address]  P: [phone]  F: [phone] [] [facility]  Outpatient Rehabilitation &  Therapy  [address]  P: [phone]  F: [phone] [] [facility]  Outpatient Rehabilitation &  Therapy  [address]  P: [phone]  F: [phone] [] [facility]  Outpatient Rehabilitation &  Therapy  [address]  P: [phone]  F: [phone]  [] [facility] Outpatient Rehabilitation &  Therapy  [address]  P: [phone]  F: [phone]     Physical Therapy Daily Treatment Note    Date:  2025  Patient Name:  Christiano Wade    :  1970  MRN: [id number]  Physician: Monae Cowan APRN - CNP  Insurance: CIG[employer] OPEN ACCESS PLUS ( 60 visit hard max no auth required)  Medical Diagnosis:   Diagnosis   M48.02, G99.2 (ICD-10-CM) - Stenosis of cervical spine with myelopathy (HCC)   M48.062 (ICD-10-CM) - Lumbar stenosis with neurogenic claudication   G83.4 (ICD-10-CM) - Cauda equina syndrome (HCC)   Z98.1 (ICD-10-CM) - S/P cervical spinal fusion   Z98.890 (ICD-10-CM) - S/P laminectomy   Rehab Codes: M54.5, M62.81  Onset Date: 25                   Next 's appt.:

## 2025-06-30 ENCOUNTER — HOSPITAL ENCOUNTER (OUTPATIENT)
Dept: PHYSICAL THERAPY | Facility: CLINIC | Age: 55
Setting detail: THERAPIES SERIES
Discharge: HOME | End: 2025-06-30
Payer: COMMERCIAL

## 2025-06-30 PROCEDURE — 97110 THERAPEUTIC EXERCISES: CPT

## 2025-06-30 NOTE — FLOWSHEET NOTE
op deficits     STG: (to be met in 10 treatments)  ? Pain:<3/10 pain LB to allow pt to stand and walk x30 min Pain rating met however pt has not walked continuously for 30 min yet  ? ROM: ensure hip extension is 10 deg or better to allow for greater ease standing tall and correcting pelvic position to allow for good posture  ? Strength: 5/5 shoulder and hip strength for greater ease with ADLs Met  ? Function:Pt to be able to walk with normal posture no forward flexion and neutral pelvis, normal sitting posture sitting on sit bones not tailbone to allow for normal head and neck posture  Met  Patient to be independent with home exercise program as demonstrated by performance with correct form without cues. Progressing     LTG: (to be met in 20 treatments)  0/10 pain back to allow pt to return to elliptical, walking and LE workouts   Pt able to wean from collar at 10 weeks and be able to demonstrate normal posture         Patient goals:regain strength in L arm and L LE and be able to get back to work and riding my [employer]    Pt. Education:  [x] Yes  [] No  [] Reviewed Prior HEP/Ed  Method of Education: [x] Verbal  [x] Demo  [] Written  Comprehension of Education:  [x] Verbalizes understanding.  [x] Demonstrates understanding.  [] Needs review.  [] Demonstrates/verbalizes HEP/Ed previously given.    Access Code: [email or web address]  URL: [email or web address]  Date: 06/19/2025  Prepared by: Amrik Schwarz    Exercises  - Shoulder External Rotation and Scapular Retraction with Resistance  - 1 x daily - 3-4 x weekly - 3 sets - 10 reps  - Shoulder extension with resistance - Neutral  - 1 x daily - 3-4 x weekly - 3 sets - 10 reps  - Standing Shoulder Horizontal Abduction with Resistance  - 1 x daily - 3-4 x weekly - 3 sets - 10 reps  - Shoulder External Rotation and Scapular Retraction with Resistance  - 1 x daily - 3-4 x weekly - 3 sets - 10 reps  - Standing Shoulder Scaption  - 1 x daily - 3-4 x weekly - 3 sets - 10 reps  - Side

## 2025-07-03 ENCOUNTER — HOSPITAL ENCOUNTER (OUTPATIENT)
Dept: PHYSICAL THERAPY | Facility: CLINIC | Age: 55
Setting detail: THERAPIES SERIES
Discharge: HOME | End: 2025-07-03
Payer: COMMERCIAL

## 2025-07-03 PROCEDURE — 97110 THERAPEUTIC EXERCISES: CPT

## 2025-07-03 NOTE — FLOWSHEET NOTE
[] [facility]  Outpatient Rehabilitation &  Therapy  [address]  [phone]  [phone] [] [facility]  Outpatient Rehabilitation &  Therapy  [address]  P: [phone]  F: [phone] [x] [facility]  Outpatient Rehabilitation &  Therapy  [address]  P: [phone]  F: [phone] [] [facility]  Outpatient Rehabilitation &  Therapy  [address]  [phone]  [phone] [] [facility]  Outpatient Rehabilitation &  Therapy  [address]   P: [phone]  F: [phone]  [] [facility]  Outpatient Rehabilitation &  Therapy  [address]  P: [phone]  F: [phone] [] [facility]  Outpatient Rehabilitation &  Therapy  [address]  P: [phone]  F: [phone] [] [facility]  Outpatient Rehabilitation &  Therapy  [address]  P: [phone]  F: [phone] [] [facility]  Outpatient Rehabilitation &  Therapy  [address]  P: [phone]  F: [phone]  [] [facility] Outpatient Rehabilitation &  Therapy  [address]  P: [phone]  F: [phone]     Physical Therapy Daily Treatment Note    Date:  7/3/2025  Patient Name:  Christiano Wade    :  1970  MRN: [id number]  Physician: Monae Cowan APRN - CNP  Insurance: CIGNA OPEN ACCESS PLUS ( 60 visit hard max no auth required)  Medical Diagnosis:   Diagnosis   M48.02, G99.2 (ICD-10-CM) - Stenosis of cervical spine with myelopathy (HCC)   M48.062 (ICD-10-CM) - Lumbar stenosis with neurogenic claudication   G83.4 (ICD-10-CM) - Cauda equina syndrome (HCC)   Z98.1 (ICD-10-CM) - S/P cervical spinal fusion   Z98.890 (ICD-10-CM) - S/P laminectomy   Rehab Codes: M54.5, M62.81  Onset Date: 25                   Next 's appt.:

## 2025-07-08 ENCOUNTER — HOSPITAL ENCOUNTER (OUTPATIENT)
Dept: PHYSICAL THERAPY | Facility: CLINIC | Age: 55
Setting detail: THERAPIES SERIES
Discharge: HOME | End: 2025-07-08
Payer: COMMERCIAL

## 2025-07-08 PROCEDURE — 97110 THERAPEUTIC EXERCISES: CPT

## 2025-07-08 NOTE — FLOWSHEET NOTE
[] [facility]  Outpatient Rehabilitation &  Therapy  [address]  [phone]  [phone] [] [facility]  Outpatient Rehabilitation &  Therapy  [address]  P: [phone]  F: [phone] [x] [facility]  Outpatient Rehabilitation &  Therapy  [address]  P: [phone]  F: [phone] [] [facility]  Outpatient Rehabilitation &  Therapy  [address]  [phone]  [phone] [] [facility]  Outpatient Rehabilitation &  Therapy  [address]   P: [phone]  F: [phone]  [] [facility]  Outpatient Rehabilitation &  Therapy  [address]  P: [phone]  F: [phone] [] [facility]  Outpatient Rehabilitation &  Therapy  [address]  P: [phone]  F: [phone] [] [facility]  Outpatient Rehabilitation &  Therapy  [address]  P: [phone]  F: [phone] [] [facility]  Outpatient Rehabilitation &  Therapy  [address]  P: [phone]  F: [phone]  [] [facility] Outpatient Rehabilitation &  Therapy  [address]  P: [phone]  F: [phone]     Physical Therapy Daily Treatment Note    Date:  2025  Patient Name:  Christiano Wade    :  1970  MRN: [id number]  Physician: Monae Cowan APRN - CNP  Insurance: CIGNA OPEN ACCESS PLUS ( 60 visit hard max no auth required)  Medical Diagnosis:   Diagnosis   M48.02, G99.2 (ICD-10-CM) - Stenosis of cervical spine with myelopathy (HCC)   M48.062 (ICD-10-CM) - Lumbar stenosis with neurogenic claudication   G83.4 (ICD-10-CM) - Cauda equina syndrome (HCC)   Z98.1 (ICD-10-CM) - S/P cervical spinal fusion   Z98.890 (ICD-10-CM) - S/P laminectomy   Rehab Codes: M54.5, M62.81  Onset Date: 25                   Next 's appt.:

## 2025-07-10 ENCOUNTER — APPOINTMENT (OUTPATIENT)
Dept: PHYSICAL THERAPY | Facility: CLINIC | Age: 55
End: 2025-07-10
Payer: COMMERCIAL

## 2025-07-11 DIAGNOSIS — I10 ESSENTIAL HYPERTENSION: ICD-10-CM

## 2025-07-11 DIAGNOSIS — I10 ESSENTIAL (PRIMARY) HYPERTENSION: ICD-10-CM

## 2025-07-11 RX ORDER — METOPROLOL SUCCINATE 50 MG/1
50 TABLET, EXTENDED RELEASE ORAL DAILY
Qty: 90 TABLET | Refills: 3 | Status: SHIPPED | OUTPATIENT
Start: 2025-07-11

## 2025-07-11 RX ORDER — TELMISARTAN 80 MG/1
80 TABLET ORAL DAILY
Qty: 90 TABLET | Refills: 3 | Status: SHIPPED | OUTPATIENT
Start: 2025-07-11

## 2025-07-11 NOTE — TELEPHONE ENCOUNTER
LAST VISIT:   1/30/2025     Future Appointments   Date Time Provider Department Center   9/29/2025  3:00 PM Rip Wren DO [facility]       Pharmacy verified? Yes    Patient states his insurance is asking for the refills to always be for 90-days.     [id number] - [place], [place] - [address] - P [phone] - F [phone]  [place] [address]  Phone: [phone]: [phone]

## (undated) DEVICE — SINGLE PORT MANIFOLD: Brand: NEPTUNE 2

## (undated) DEVICE — SUTURE VICRYL SZ 2-0 L18IN ABSRB VLT L26MM SH 1/2 CIR J775D

## (undated) DEVICE — ELECTRODE PT RET AD L9FT HI MOIST COND ADH HYDRGEL CORDED

## (undated) DEVICE — DRESSING,GAUZE,XEROFORM,CURAD,1"X8",ST: Brand: CURAD

## (undated) DEVICE — SPONGE,NEURO,0.5"X3",XR,STRL,LF,10/PK: Brand: MEDLINE

## (undated) DEVICE — APPLICATOR MEDICATED 10.5 CC SOLUTION HI LT ORNG CHLORAPREP

## (undated) DEVICE — SUTURE VICRYL SZ 3-0 L18IN ABSRB UD L26MM SH 1/2 CIR J864D

## (undated) DEVICE — DRAPE MICSCP W117XL305CM DIA68MM LENS W VARI LENS2 FOR LEICA

## (undated) DEVICE — ZIMMER® STERILE DISPOSABLE TOURNIQUET CUFF WITH PLC, DUAL PORT, SINGLE BLADDER, 30 IN. (76 CM)

## (undated) DEVICE — GLOVE SURG SZ 85 L12IN FNGR THK79MIL GRN LTX FREE

## (undated) DEVICE — SUTURE VICRYL + SZ 0 L18IN ABSRB UD L36MM CT-1 1/2 CIR VCP840D

## (undated) DEVICE — GAUZE,SPONGE,4"X4",16PLY,XRAY,STRL,LF: Brand: MEDLINE

## (undated) DEVICE — GARMENT,MEDLINE,DVT,INT,CALF,MED, GEN2: Brand: MEDLINE

## (undated) DEVICE — CONNECTOR TBNG WHT PLAS SUCT STR 5IN1 LTWT W/ M CONN

## (undated) DEVICE — SYRINGE MED 10ML TRNSLUC BRL PLUNG BLK MRK POLYPR CTRL

## (undated) DEVICE — SUTURE NONABSORBABLE MONOFILAMENT 3-0 PS-1 18 IN BLK ETHILON 1663H

## (undated) DEVICE — SHEET,DRAPE,70X100,STERILE: Brand: MEDLINE

## (undated) DEVICE — DRAPE,REIN 53X77,STERILE: Brand: MEDLINE

## (undated) DEVICE — MERCY HEALTH ST CHARLES: Brand: MEDLINE INDUSTRIES, INC.

## (undated) DEVICE — Device

## (undated) DEVICE — 450 ML BOTTLE OF 0.05% CHLORHEXIDINE GLUCONATE IN 99.95% STERILE WATER FOR IRRIGATION, USP AND APPLICATOR.: Brand: IRRISEPT ANTIMICROBIAL WOUND LAVAGE

## (undated) DEVICE — LIQUIBAND RAPID ADHESIVE 36/CS 0.8ML: Brand: MEDLINE

## (undated) DEVICE — 1LYRTR 16FR10ML100%SIL UMS SNP: Brand: MEDLINE INDUSTRIES, INC.

## (undated) DEVICE — [TOMCAT CUTTER, ARTHROSCOPIC SHAVER BLADE,  DO NOT RESTERILIZE,  DO NOT USE IF PACKAGE IS DAMAGED,  KEEP DRY,  KEEP AWAY FROM SUNLIGHT]: Brand: FORMULA

## (undated) DEVICE — COVER,TABLE,HEAVY DUTY,60"X90",STRL: Brand: MEDLINE

## (undated) DEVICE — 3.0MM PRECISION NEURO (MATCH HEAD)

## (undated) DEVICE — 1000 S-DRAPE TOWEL DRAPE 10/BX: Brand: STERI-DRAPE™

## (undated) DEVICE — SOLUTION IV IRRIG LACTATED RINGERS 3000ML 2B7487

## (undated) DEVICE — SPONGE,PEANUT,XRAY,ST,SM,3/8",5/CARD: Brand: MEDLINE INDUSTRIES, INC.

## (undated) DEVICE — STRAP,POSITIONING,KNEE/BODY,FOAM,4X60": Brand: MEDLINE

## (undated) DEVICE — NEEDLE, QUINCKE, 18GX3.5": Brand: MEDLINE

## (undated) DEVICE — COVER LT HNDL BLU PLAS

## (undated) DEVICE — DRAPE,THYROID,SOFT,STERILE: Brand: MEDLINE

## (undated) DEVICE — BLADE CLP TAPR HD WET DRY CAPABILITY GTT IN CHARGING USE

## (undated) DEVICE — DRAPE C ARM W/ POLY STRP W42XL72IN FOR MOB XR

## (undated) DEVICE — MARKER,SKIN,WI/RULER AND LABELS: Brand: MEDLINE

## (undated) DEVICE — GOWN,AURORA,NONREINFORCED,LARGE: Brand: MEDLINE

## (undated) DEVICE — DISPOSABLE IRRIGATION BIPOLAR CORD, M1000 TYPE: Brand: KIRWAN

## (undated) DEVICE — NEPTUNE E-SEP SMOKE EVACUATION PENCIL, COATED, 70MM BLADE, PUSH BUTTON SWITCH: Brand: NEPTUNE E-SEP

## (undated) DEVICE — COVER,MAYO STAND,XL,STERILE: Brand: MEDLINE

## (undated) DEVICE — YANKAUER,FLEXIBLE HANDLE,REGLR CAPACITY: Brand: MEDLINE INDUSTRIES, INC.

## (undated) DEVICE — SUTURE VICRYL SZ 2-0 L18IN ABSRB UD CT-1 L36MM 1/2 CIR J839D

## (undated) DEVICE — SUTURE ETHLN SZ 3-0 L18IN NONABSORBABLE BLK FS-1 L24MM 3/8 663H

## (undated) DEVICE — PACK ARTHRO W PCH

## (undated) DEVICE — GLOVE ORTHO 8   MSG9480

## (undated) DEVICE — SUTURE PROL SZ 6-0 L24IN NONABSORBABLE BLU L9.3MM BV-1 3/8 8805H

## (undated) DEVICE — MITT SURG PREP L ADH DISPOSABLE

## (undated) DEVICE — DRAPE,LAP,CHOLE,W/TROUGHS,STERILE: Brand: MEDLINE

## (undated) DEVICE — GAUZE,SPONGE,FLUFF,6"X6.75",STRL,5/TRAY: Brand: MEDLINE

## (undated) DEVICE — PAD,NON-ADHERENT,3X8,STERILE,LF,1/PK: Brand: MEDLINE

## (undated) DEVICE — GOWN,SIRUS,NONRNF,SETINSLV,XL,20/CS: Brand: MEDLINE

## (undated) DEVICE — GLOVE SURG SZ 75 CRM LTX FREE POLYISOPRENE POLYMER BEAD ANTI

## (undated) DEVICE — APPLICATOR MEDICATED 26 CC SOLUTION HI LT ORNG CHLORAPREP

## (undated) DEVICE — PRECISION MATCH HEAD

## (undated) DEVICE — STERILE POLYISOPRENE POWDER-FREE SURGICAL GLOVES WITH EMOLLIENT COATING: Brand: PROTEXIS

## (undated) DEVICE — GLOVE SURG SZ 8 CRM LTX FREE POLYISOPRENE POLYMER BEAD ANTI

## (undated) DEVICE — STVZ LUMBAR SPINE PACK: Brand: MEDLINE INDUSTRIES, INC.

## (undated) DEVICE — PADDING UNDERCAST W6INXL4YD RAYON POLY SYN NONADHESIVE

## (undated) DEVICE — ABS MED DISTRACTION PIN, 14MM PATIENT (INNER): Brand: ABS MED DISTRACTION PIN

## (undated) DEVICE — THE STERILE LIGHT HANDLE COVER IS USED WITH STERIS SURGICAL LIGHTING AND VISUALIZATION SYSTEMS.

## (undated) DEVICE — PROTECTOR ULN NRV PUR FOAM HK LOOP STRP ANATOMICALLY

## (undated) DEVICE — AGENT HEMOSTATIC SURGIFLOW MATRIX KIT W/THROMBIN

## (undated) DEVICE — BLADE ES L4IN INSUL EDGE

## (undated) DEVICE — BLADE ES L6IN ELASTOMERIC COAT INSUL DURABLE BEND UPTO

## (undated) DEVICE — SUTURE MONOCRYL SZ 4-0 L18IN ABSRB UD L19MM PS-2 3/8 CIR PRIM Y496G

## (undated) DEVICE — KIT DRN FLAT W/ 100CC EVAC 7MM FULL PERF

## (undated) DEVICE — COLLAR CERV UNIV AD L13-19IN TRACH OPN TWO PC RIG POLYETH

## (undated) DEVICE — SURGICAL SUCTION CONNECTING TUBE WITH MALE CONNECTOR AND SUCTION CLAMP, 2 FT. LONG (.6 M), 5 MM I.D.: Brand: CONMED

## (undated) DEVICE — BLADE ES ELASTOMERIC COAT INSUL DURABLE BEND UPTO 90DEG